# Patient Record
Sex: MALE | Race: AMERICAN INDIAN OR ALASKA NATIVE | NOT HISPANIC OR LATINO | URBAN - METROPOLITAN AREA
[De-identification: names, ages, dates, MRNs, and addresses within clinical notes are randomized per-mention and may not be internally consistent; named-entity substitution may affect disease eponyms.]

---

## 2017-03-20 ENCOUNTER — EMERGENCY (EMERGENCY)
Facility: HOSPITAL | Age: 77
LOS: 0 days | Discharge: HOME | End: 2017-03-20
Attending: EMERGENCY MEDICINE
Payer: MEDICARE

## 2017-03-20 VITALS
SYSTOLIC BLOOD PRESSURE: 132 MMHG | HEART RATE: 55 BPM | RESPIRATION RATE: 17 BRPM | TEMPERATURE: 98 F | DIASTOLIC BLOOD PRESSURE: 78 MMHG | WEIGHT: 154.1 LBS | OXYGEN SATURATION: 97 % | HEIGHT: 67 IN

## 2017-03-20 DIAGNOSIS — I10 ESSENTIAL (PRIMARY) HYPERTENSION: ICD-10-CM

## 2017-03-20 DIAGNOSIS — Z79.4 LONG TERM (CURRENT) USE OF INSULIN: ICD-10-CM

## 2017-03-20 DIAGNOSIS — M25.511 PAIN IN RIGHT SHOULDER: ICD-10-CM

## 2017-03-20 DIAGNOSIS — E11.9 TYPE 2 DIABETES MELLITUS WITHOUT COMPLICATIONS: ICD-10-CM
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
## 2017-03-20 DIAGNOSIS — I25.10 ATHEROSCLEROTIC HEART DISEASE OF NATIVE CORONARY ARTERY WITHOUT ANGINA PECTORIS: ICD-10-CM

## 2017-03-20 DIAGNOSIS — S43.006A UNSPECIFIED DISLOCATION OF UNSPECIFIED SHOULDER JOINT, INITIAL ENCOUNTER: ICD-10-CM

## 2017-03-20 PROCEDURE — 70450 CT HEAD/BRAIN W/O DYE: CPT | Mod: 26

## 2017-03-20 PROCEDURE — 23650 CLTX SHO DSLC W/MNPJ WO ANES: CPT | Mod: 54

## 2017-03-20 PROCEDURE — 73060 X-RAY EXAM OF HUMERUS: CPT | Mod: 26,RT

## 2017-03-20 PROCEDURE — 73030 X-RAY EXAM OF SHOULDER: CPT | Mod: 26,RT

## 2017-03-20 PROCEDURE — 99284 EMERGENCY DEPT VISIT MOD MDM: CPT | Mod: 25

## 2017-03-20 PROCEDURE — 73030 X-RAY EXAM OF SHOULDER: CPT | Mod: 26,77,RT

## 2017-03-20 RX ORDER — LIDOCAINE HCL 20 MG/ML
15 VIAL (ML) INJECTION ONCE
Qty: 0 | Refills: 0 | Status: COMPLETED | OUTPATIENT
Start: 2017-03-20 | End: 2017-03-20

## 2017-03-20 RX ORDER — IBUPROFEN 200 MG
1 TABLET ORAL
Qty: 9 | Refills: 0 | OUTPATIENT
Start: 2017-03-20 | End: 2017-03-23

## 2017-03-20 RX ADMIN — Medication 15 MILLILITER(S): at 12:59

## 2017-03-20 NOTE — ED ADULT NURSE NOTE - PMH
CAD (Coronary Artery Disease)  s/p 4 stents (1 in '02, 1 in '04, 2 in '11)  CVA (Cerebral Infarction)    DM (Diabetes Mellitus)    HTN - Hypertension    PUD (Peptic Ulcer Disease)

## 2017-03-20 NOTE — ED PROCEDURE NOTE - NS ED PERI VASCULAR NEG
no swelling/no cyanosis of extremity/capillary refill time < 2 seconds/no paresthesia/fingers/toes warm to touch

## 2017-03-20 NOTE — ED ADULT NURSE NOTE - OBJECTIVE STATEMENT
pt presents to ED s/p fall in bath tub, denies head/ neck / back pain.  Pt slipped and fell w/o LOC.

## 2017-03-20 NOTE — ED PROVIDER NOTE - PHYSICAL EXAMINATION
Gen: Alert, Well appearing. NAD    Head: NC, AT, PERRL, EOMI, normal lids/conjunctiva   ENT: Bilateral TM WNL, normal hearing, patent oropharynx without erythema/exudate, uvula midline  Neck: supple, no tenderness/meningismus/JVD   Pulm: Bilateral clear BS, normal resp effort, no wheeze/stridor/retractions  CV: RRR, no M/R/G, +dist pulses   Abd: soft, NT/ND, +BS, no guarding/rebound tenderness  Mskel: ? dislocation. + no tenderness to shoulder, rather proximal/mid humerus. from in wrist and sensation and pulses intact.   Skin: no rash   Neuro: AAOx2, no sensory/motor deficits, CN 2-12 intact

## 2017-03-20 NOTE — ED PROVIDER NOTE - OBJECTIVE STATEMENT
75yo male from home, lives with daughter, ambulates with cane, with pmh HTN, CAD s/p 4 stents, CHF, CVA, DM, mild dementia, presents s/p slip and fall in shower. Pt denies dizziness, cp, headache, head injury, LOC. Pt only with rt shoulder/humerus pain.     ROS: No fever/chills. No photophobia/eye pain/changes in vision, No ear pain/sore throat/dysphagia, No chest pain/palpitations. No SOB/cough/stridor. No abdominal pain, N/V/D, no black/bloody bm. No dysuria/frequency/discharge, No headache. No Dizziness.  No rash.  No numbness/tingling/weakness. 77yo male from home, lives with daughter, ambulates with cane, with pmh HTN, CAD s/p 4 stents, CHF, CVA, DM, mild dementia, presents s/p slip and fall in shower. Pt denies dizziness, cp, headache, head injury, LOC. Pt only with rt shoulder/humerus pain. family at bedside, states just moved into new place, not set up for him yet. heard him fall in the shower. pt at baseline mental status.     ROS: No fever/chills. No photophobia/eye pain/changes in vision, No ear pain/sore throat/dysphagia, No chest pain/palpitations. No SOB/cough/stridor. No abdominal pain, N/V/D, no black/bloody bm. No dysuria/frequency/discharge, No headache. No Dizziness.  No rash.  No numbness/tingling/weakness.

## 2017-03-20 NOTE — ED PROVIDER NOTE - CARE PLAN
Principal Discharge DX:	Shoulder dislocation  Secondary Diagnosis:	Shoulder fracture  Secondary Diagnosis:	Fall

## 2017-03-20 NOTE — ED PROVIDER NOTE - MEDICAL DECISION MAKING DETAILS
shoulder reduced with IM lidocaine injection. no complications. CT scan demonstrates no acute pathology. + hill sachs prior to reduction. fu with ortho. sling given and tylenol motrin for pain. Discussed results and outcome of testing with the patient.  Patient given copy of available results. Patient advised to please follow up with their PMD within the next 24 hours and return to the Emergency Department for worsening symptoms or any other concerns.

## 2017-06-29 ENCOUNTER — INPATIENT (INPATIENT)
Facility: HOSPITAL | Age: 77
LOS: 3 days | Discharge: HOME | End: 2017-07-03
Attending: INTERNAL MEDICINE | Admitting: INTERNAL MEDICINE
Payer: MEDICARE

## 2017-06-29 VITALS
DIASTOLIC BLOOD PRESSURE: 94 MMHG | SYSTOLIC BLOOD PRESSURE: 133 MMHG | RESPIRATION RATE: 18 BRPM | HEIGHT: 67 IN | TEMPERATURE: 98 F | WEIGHT: 156.09 LBS | HEART RATE: 64 BPM | OXYGEN SATURATION: 100 %

## 2017-06-29 DIAGNOSIS — I21.4 NON-ST ELEVATION (NSTEMI) MYOCARDIAL INFARCTION: ICD-10-CM

## 2017-06-29 DIAGNOSIS — I50.9 HEART FAILURE, UNSPECIFIED: ICD-10-CM

## 2017-06-29 DIAGNOSIS — J15.6 PNEUMONIA DUE TO OTHER GRAM-NEGATIVE BACTERIA: ICD-10-CM

## 2017-06-29 DIAGNOSIS — I50.21 ACUTE SYSTOLIC (CONGESTIVE) HEART FAILURE: ICD-10-CM

## 2017-06-29 LAB
ALBUMIN SERPL ELPH-MCNC: 3 G/DL — LOW (ref 3.3–5)
ALP SERPL-CCNC: 96 U/L — SIGNIFICANT CHANGE UP (ref 40–120)
ALT FLD-CCNC: 15 U/L — SIGNIFICANT CHANGE UP (ref 12–78)
ANION GAP SERPL CALC-SCNC: 8 MMOL/L — SIGNIFICANT CHANGE UP (ref 5–17)
APTT BLD: 27.2 SEC — LOW (ref 27.5–37.4)
AST SERPL-CCNC: 23 U/L — SIGNIFICANT CHANGE UP (ref 15–37)
BASOPHILS # BLD AUTO: 0.1 K/UL — SIGNIFICANT CHANGE UP (ref 0–0.2)
BASOPHILS NFR BLD AUTO: 1.1 % — SIGNIFICANT CHANGE UP (ref 0–2)
BILIRUB SERPL-MCNC: 0.8 MG/DL — SIGNIFICANT CHANGE UP (ref 0.2–1.2)
BUN SERPL-MCNC: 10 MG/DL — SIGNIFICANT CHANGE UP (ref 7–23)
CALCIUM SERPL-MCNC: 8.9 MG/DL — SIGNIFICANT CHANGE UP (ref 8.5–10.1)
CHLORIDE SERPL-SCNC: 108 MMOL/L — SIGNIFICANT CHANGE UP (ref 96–108)
CK MB CFR SERPL CALC: 1.7 NG/ML — SIGNIFICANT CHANGE UP (ref 0.5–3.6)
CK SERPL-CCNC: 26 U/L — SIGNIFICANT CHANGE UP (ref 26–308)
CK SERPL-CCNC: 33 U/L — SIGNIFICANT CHANGE UP (ref 26–308)
CO2 SERPL-SCNC: 24 MMOL/L — SIGNIFICANT CHANGE UP (ref 22–31)
CREAT SERPL-MCNC: 0.92 MG/DL — SIGNIFICANT CHANGE UP (ref 0.5–1.3)
EOSINOPHIL # BLD AUTO: 0.2 K/UL — SIGNIFICANT CHANGE UP (ref 0–0.5)
EOSINOPHIL NFR BLD AUTO: 3.1 % — SIGNIFICANT CHANGE UP (ref 0–6)
GLUCOSE SERPL-MCNC: 82 MG/DL — SIGNIFICANT CHANGE UP (ref 70–99)
HCT VFR BLD CALC: 41.4 % — SIGNIFICANT CHANGE UP (ref 39–50)
HGB BLD-MCNC: 14 G/DL — SIGNIFICANT CHANGE UP (ref 13–17)
INR BLD: 0.99 RATIO — SIGNIFICANT CHANGE UP (ref 0.88–1.16)
LIDOCAIN IGE QN: 177 U/L — SIGNIFICANT CHANGE UP (ref 73–393)
LYMPHOCYTES # BLD AUTO: 2.1 K/UL — SIGNIFICANT CHANGE UP (ref 1–3.3)
LYMPHOCYTES # BLD AUTO: 26.2 % — SIGNIFICANT CHANGE UP (ref 13–44)
MCHC RBC-ENTMCNC: 26.8 PG — LOW (ref 27–34)
MCHC RBC-ENTMCNC: 33.9 GM/DL — SIGNIFICANT CHANGE UP (ref 32–36)
MCV RBC AUTO: 79.1 FL — LOW (ref 80–100)
MONOCYTES # BLD AUTO: 0.5 K/UL — SIGNIFICANT CHANGE UP (ref 0–0.9)
MONOCYTES NFR BLD AUTO: 6.7 % — SIGNIFICANT CHANGE UP (ref 2–14)
NEUTROPHILS # BLD AUTO: 5 K/UL — SIGNIFICANT CHANGE UP (ref 1.8–7.4)
NEUTROPHILS NFR BLD AUTO: 62.9 % — SIGNIFICANT CHANGE UP (ref 43–77)
NT-PROBNP SERPL-SCNC: 8131 PG/ML — HIGH (ref 0–450)
NT-PROBNP SERPL-SCNC: 8250 PG/ML — HIGH (ref 0–450)
PLATELET # BLD AUTO: 145 K/UL — LOW (ref 150–400)
POTASSIUM SERPL-MCNC: 4.7 MMOL/L — SIGNIFICANT CHANGE UP (ref 3.5–5.3)
POTASSIUM SERPL-SCNC: 4.7 MMOL/L — SIGNIFICANT CHANGE UP (ref 3.5–5.3)
PROT SERPL-MCNC: 7.1 GM/DL — SIGNIFICANT CHANGE UP (ref 6–8.3)
PROTHROM AB SERPL-ACNC: 10.8 SEC — SIGNIFICANT CHANGE UP (ref 9.8–12.7)
RBC # BLD: 5.23 M/UL — SIGNIFICANT CHANGE UP (ref 4.2–5.8)
RBC # FLD: 14.5 % — SIGNIFICANT CHANGE UP (ref 11–15)
SODIUM SERPL-SCNC: 140 MMOL/L — SIGNIFICANT CHANGE UP (ref 135–145)
TROPONIN I SERPL-MCNC: 0.05 NG/ML — HIGH (ref 0.01–0.04)
TROPONIN I SERPL-MCNC: 0.05 NG/ML — HIGH (ref 0.01–0.04)
TROPONIN I SERPL-MCNC: 0.06 NG/ML — HIGH (ref 0.01–0.04)
WBC # BLD: 8 K/UL — SIGNIFICANT CHANGE UP (ref 3.8–10.5)
WBC # FLD AUTO: 8 K/UL — SIGNIFICANT CHANGE UP (ref 3.8–10.5)

## 2017-06-29 PROCEDURE — 71250 CT THORAX DX C-: CPT | Mod: 26

## 2017-06-29 PROCEDURE — 71010: CPT | Mod: 26

## 2017-06-29 PROCEDURE — 99285 EMERGENCY DEPT VISIT HI MDM: CPT

## 2017-06-29 RX ORDER — CEFTRIAXONE 500 MG/1
1 INJECTION, POWDER, FOR SOLUTION INTRAMUSCULAR; INTRAVENOUS EVERY 24 HOURS
Qty: 0 | Refills: 0 | Status: DISCONTINUED | OUTPATIENT
Start: 2017-06-30 | End: 2017-07-03

## 2017-06-29 RX ORDER — FUROSEMIDE 40 MG
20 TABLET ORAL ONCE
Qty: 0 | Refills: 0 | Status: COMPLETED | OUTPATIENT
Start: 2017-06-29 | End: 2017-06-29

## 2017-06-29 RX ORDER — ENOXAPARIN SODIUM 100 MG/ML
40 INJECTION SUBCUTANEOUS EVERY 24 HOURS
Qty: 0 | Refills: 0 | Status: DISCONTINUED | OUTPATIENT
Start: 2017-06-29 | End: 2017-07-03

## 2017-06-29 RX ORDER — FUROSEMIDE 40 MG
40 TABLET ORAL DAILY
Qty: 0 | Refills: 0 | Status: COMPLETED | OUTPATIENT
Start: 2017-06-29 | End: 2017-07-02

## 2017-06-29 RX ORDER — METOPROLOL TARTRATE 50 MG
50 TABLET ORAL
Qty: 0 | Refills: 0 | Status: DISCONTINUED | OUTPATIENT
Start: 2017-06-29 | End: 2017-07-03

## 2017-06-29 RX ORDER — LOSARTAN POTASSIUM 100 MG/1
100 TABLET, FILM COATED ORAL DAILY
Qty: 0 | Refills: 0 | Status: DISCONTINUED | OUTPATIENT
Start: 2017-06-29 | End: 2017-07-03

## 2017-06-29 RX ORDER — MONTELUKAST 4 MG/1
10 TABLET, CHEWABLE ORAL DAILY
Qty: 0 | Refills: 0 | Status: DISCONTINUED | OUTPATIENT
Start: 2017-06-29 | End: 2017-07-03

## 2017-06-29 RX ORDER — MEMANTINE HYDROCHLORIDE 10 MG/1
10 TABLET ORAL
Qty: 0 | Refills: 0 | Status: DISCONTINUED | OUTPATIENT
Start: 2017-06-29 | End: 2017-07-03

## 2017-06-29 RX ORDER — AZITHROMYCIN 500 MG/1
500 TABLET, FILM COATED ORAL ONCE
Qty: 0 | Refills: 0 | Status: COMPLETED | OUTPATIENT
Start: 2017-06-29 | End: 2017-06-29

## 2017-06-29 RX ORDER — DEXTROSE 50 % IN WATER 50 %
12.5 SYRINGE (ML) INTRAVENOUS ONCE
Qty: 0 | Refills: 0 | Status: DISCONTINUED | OUTPATIENT
Start: 2017-06-29 | End: 2017-07-03

## 2017-06-29 RX ORDER — GLUCAGON INJECTION, SOLUTION 0.5 MG/.1ML
1 INJECTION, SOLUTION SUBCUTANEOUS ONCE
Qty: 0 | Refills: 0 | Status: DISCONTINUED | OUTPATIENT
Start: 2017-06-29 | End: 2017-07-03

## 2017-06-29 RX ORDER — DEXTROSE 50 % IN WATER 50 %
25 SYRINGE (ML) INTRAVENOUS ONCE
Qty: 0 | Refills: 0 | Status: DISCONTINUED | OUTPATIENT
Start: 2017-06-29 | End: 2017-07-03

## 2017-06-29 RX ORDER — ASPIRIN/CALCIUM CARB/MAGNESIUM 324 MG
325 TABLET ORAL DAILY
Qty: 0 | Refills: 0 | Status: DISCONTINUED | OUTPATIENT
Start: 2017-06-29 | End: 2017-07-03

## 2017-06-29 RX ORDER — INSULIN GLARGINE 100 [IU]/ML
15 INJECTION, SOLUTION SUBCUTANEOUS AT BEDTIME
Qty: 0 | Refills: 0 | Status: DISCONTINUED | OUTPATIENT
Start: 2017-06-29 | End: 2017-07-03

## 2017-06-29 RX ORDER — AZITHROMYCIN 500 MG/1
TABLET, FILM COATED ORAL
Qty: 0 | Refills: 0 | Status: DISCONTINUED | OUTPATIENT
Start: 2017-06-29 | End: 2017-07-03

## 2017-06-29 RX ORDER — DEXTROSE 50 % IN WATER 50 %
1 SYRINGE (ML) INTRAVENOUS ONCE
Qty: 0 | Refills: 0 | Status: DISCONTINUED | OUTPATIENT
Start: 2017-06-29 | End: 2017-07-03

## 2017-06-29 RX ORDER — CEFTRIAXONE 500 MG/1
INJECTION, POWDER, FOR SOLUTION INTRAMUSCULAR; INTRAVENOUS
Qty: 0 | Refills: 0 | Status: DISCONTINUED | OUTPATIENT
Start: 2017-06-29 | End: 2017-07-03

## 2017-06-29 RX ORDER — ASCORBIC ACID 60 MG
500 TABLET,CHEWABLE ORAL DAILY
Qty: 0 | Refills: 0 | Status: DISCONTINUED | OUTPATIENT
Start: 2017-06-29 | End: 2017-07-03

## 2017-06-29 RX ORDER — CEFTRIAXONE 500 MG/1
1 INJECTION, POWDER, FOR SOLUTION INTRAMUSCULAR; INTRAVENOUS ONCE
Qty: 0 | Refills: 0 | Status: COMPLETED | OUTPATIENT
Start: 2017-06-29 | End: 2017-06-29

## 2017-06-29 RX ORDER — AZITHROMYCIN 500 MG/1
500 TABLET, FILM COATED ORAL EVERY 24 HOURS
Qty: 0 | Refills: 0 | Status: DISCONTINUED | OUTPATIENT
Start: 2017-06-30 | End: 2017-07-03

## 2017-06-29 RX ORDER — PANTOPRAZOLE SODIUM 20 MG/1
40 TABLET, DELAYED RELEASE ORAL
Qty: 0 | Refills: 0 | Status: DISCONTINUED | OUTPATIENT
Start: 2017-06-29 | End: 2017-07-03

## 2017-06-29 RX ORDER — SODIUM CHLORIDE 9 MG/ML
1000 INJECTION, SOLUTION INTRAVENOUS
Qty: 0 | Refills: 0 | Status: DISCONTINUED | OUTPATIENT
Start: 2017-06-29 | End: 2017-07-03

## 2017-06-29 RX ORDER — GABAPENTIN 400 MG/1
200 CAPSULE ORAL AT BEDTIME
Qty: 0 | Refills: 0 | Status: DISCONTINUED | OUTPATIENT
Start: 2017-06-29 | End: 2017-07-03

## 2017-06-29 RX ORDER — ATORVASTATIN CALCIUM 80 MG/1
40 TABLET, FILM COATED ORAL AT BEDTIME
Qty: 0 | Refills: 0 | Status: DISCONTINUED | OUTPATIENT
Start: 2017-06-29 | End: 2017-07-03

## 2017-06-29 RX ORDER — INSULIN LISPRO 100/ML
VIAL (ML) SUBCUTANEOUS
Qty: 0 | Refills: 0 | Status: DISCONTINUED | OUTPATIENT
Start: 2017-06-29 | End: 2017-07-03

## 2017-06-29 RX ORDER — TAMSULOSIN HYDROCHLORIDE 0.4 MG/1
0.4 CAPSULE ORAL AT BEDTIME
Qty: 0 | Refills: 0 | Status: DISCONTINUED | OUTPATIENT
Start: 2017-06-29 | End: 2017-07-03

## 2017-06-29 RX ADMIN — MONTELUKAST 10 MILLIGRAM(S): 4 TABLET, CHEWABLE ORAL at 12:33

## 2017-06-29 RX ADMIN — Medication 50 MILLIGRAM(S): at 17:52

## 2017-06-29 RX ADMIN — TAMSULOSIN HYDROCHLORIDE 0.4 MILLIGRAM(S): 0.4 CAPSULE ORAL at 21:32

## 2017-06-29 RX ADMIN — ATORVASTATIN CALCIUM 40 MILLIGRAM(S): 80 TABLET, FILM COATED ORAL at 21:32

## 2017-06-29 RX ADMIN — INSULIN GLARGINE 15 UNIT(S): 100 INJECTION, SOLUTION SUBCUTANEOUS at 21:38

## 2017-06-29 RX ADMIN — GABAPENTIN 200 MILLIGRAM(S): 400 CAPSULE ORAL at 21:32

## 2017-06-29 RX ADMIN — CEFTRIAXONE 100 GRAM(S): 500 INJECTION, POWDER, FOR SOLUTION INTRAMUSCULAR; INTRAVENOUS at 16:43

## 2017-06-29 RX ADMIN — Medication 20 MILLIGRAM(S): at 10:02

## 2017-06-29 RX ADMIN — AZITHROMYCIN 255 MILLIGRAM(S): 500 TABLET, FILM COATED ORAL at 15:46

## 2017-06-29 RX ADMIN — ENOXAPARIN SODIUM 40 MILLIGRAM(S): 100 INJECTION SUBCUTANEOUS at 11:54

## 2017-06-29 RX ADMIN — MEMANTINE HYDROCHLORIDE 10 MILLIGRAM(S): 10 TABLET ORAL at 17:52

## 2017-06-29 RX ADMIN — Medication 325 MILLIGRAM(S): at 11:54

## 2017-06-29 RX ADMIN — Medication 500 MILLIGRAM(S): at 12:34

## 2017-06-29 NOTE — ED ADULT NURSE NOTE - PMH
CAD (Coronary Artery Disease)  s/p 4 stents (1 in '02, 1 in '04, 2 in '11)  CVA (Cerebral Infarction)    Dementia    DM (Diabetes Mellitus)    HTN - Hypertension    PUD (Peptic Ulcer Disease)

## 2017-06-29 NOTE — H&P ADULT - NSHPREVIEWOFSYSTEMS_GEN_ALL_CORE
CONSTITUTIONAL: No fever, weight loss, or fatigue  EYES: No eye pain, visual disturbances, or discharge  ENMT:  No difficulty hearing, tinnitus, vertigo; No sinus or throat pain  NECK: No pain or stiffness  BREASTS: No pain, masses, or nipple discharge  RESPIRATORY: +++ cough,  NO wheezing, chills or hemoptysis; +++ shortness of breath, ++ PND  CARDIOVASCULAR: No chest pain, palpitations, dizziness, or leg swelling  GASTROINTESTINAL: No abdominal or epigastric pain. No nausea, vomiting, or hematemesis; No diarrhea or constipation. No melena or hematochezia.  GENITOURINARY: No dysuria, frequency, hematuria, or incontinence  NEUROLOGICAL: No headaches, memory loss, loss of strength, numbness, or tremors  SKIN: No itching, burning, rashes, or lesions   LYMPH NODES: No enlarged glands  ENDOCRINE: No heat or cold intolerance; No hair loss  MUSCULOSKELETAL: No joint pain or swelling; No muscle, back, or extremity pain  PSYCHIATRIC: No depression, anxiety, mood swings, or difficulty sleeping  HEME/LYMPH: No easy bruising, or bleeding gums  ALLERGY AND IMMUNOLOGIC: No hives or eczema

## 2017-06-29 NOTE — H&P ADULT - NSHPLABSRESULTS_GEN_ALL_CORE
LABS:                        14.0   8.0   )-----------( 145      ( 29 Jun 2017 08:29 )             41.4     06-29    140  |  108  |  10  ----------------------------<  82  4.7   |  24  |  0.92    Ca    8.9      29 Jun 2017 08:29    TPro  7.1  /  Alb  3.0<L>  /  TBili  0.8  /  DBili  x   /  AST  23  /  ALT  15  /  AlkPhos  96  06-29    PT/INR - ( 29 Jun 2017 08:46 )   PT: 10.8 sec;   INR: 0.99 ratio         PTT - ( 29 Jun 2017 08:46 )  PTT:27.2 sec    CAPILLARY BLOOD GLUCOSE  148 (29 Jun 2017 11:11)        CARDIAC MARKERS ( 29 Jun 2017 11:48 )  .046 ng/mL / x     / 33 U/L / x     / x      CARDIAC MARKERS ( 29 Jun 2017 08:29 )  .054 ng/mL / x     / x     / x     / 1.7 ng/mL    < from: Xray Chest 1 View AP/PA. (06.29.17 @ 07:55) >      EXAM:  CHEST SINGLE VIEW                            PROCEDURE DATE:  06/29/2017        INTERPRETATION:  cough      A single portable radiograph suggests cardiomegaly, although there may be   magnification from technique..    Vascular markings are increased    There are atelectatic changes right greater than left. A right lower lobe   infiltrate and/or mass is identified. There is a right pleural effusion.   There is trace fluid on the left    No pneumothorax is seen    The osseous structures are grossly intact.    IMPRESSION:    Cardiomegaly. Congestive changes. Right lower lobe infiltrate. However a   mass cannot be excluded. Right pleural effusion. CT imaging of the chest   recommended..

## 2017-06-29 NOTE — H&P ADULT - HISTORY OF PRESENT ILLNESS
76yo, [place] with h/o  dementia, HTN, HL, DM2, ACD x 2 who presents to the ED with  increasing SOB worsening last night, associated with 2 pillow orthopnea and PND.  Notes cough for 4-5 days.  Denies CP,  palpitation, n/v/d, fever, chills, weakness, abdominal pain ,dysuria.   IN ER CXR, CHF and elevated troponin 0.054

## 2017-06-29 NOTE — H&P ADULT - NSHPPHYSICALEXAM_GEN_ALL_CORE
PHYSICAL EXAM:  GENERAL: NAD, well-groomed, well-developed  HEAD:  Atraumatic, Normocephalic  EYES: EOMI, PERRLA, conjunctiva and sclera clear  ENMT: No tonsillar erythema, exudates, or enlargement; Moist mucous membranes, No lesions  NECK: Supple, No JVD, Normal thyroid  NERVOUS SYSTEM:  Alert & Oriented X3; Motor Strength 5/5 B/L upper and lower extremities; DTRs 2+ intact and symmetric  CHEST/LUNG: Decreased BS bilaterally ; Note rales,  NO rhonchi, wheezing, or rubs  HEART: Regular rate and rhythm; No murmurs, rubs, or gallops  ABDOMEN: Soft, Nontender, Nondistended; Bowel sounds present  EXTREMITIES:  2+ Peripheral Pulses, No clubbing, cyanosis, or edema  LYMPH: No lymphadenopathy noted  SKIN: No rashes or lesions

## 2017-06-29 NOTE — ED PROVIDER NOTE - OBJECTIVE STATEMENT
Pt is a 76 yo gentleman with a pmhx of dementia, HTN, HL, IDDM2, ACD x 2 who presents to the ED with SOB starting last night. Has 2 pillow orthopnea and better when he sits up. No leg swelling, no hx of dvt/pe, no known hx of CHF, no cough, no fevers, no chest pain, no abdominal pain. Pt is a 76 yo gentleman with a pmhx of dementia, HTN, HL, IDDM2, ACD x 2 who presents to the ED with SOB starting last night. Has 2 pillow orthopnea and better when he sits up. No leg swelling, no hx of dvt/pe, no known hx of CHF, no cough, no fevers, no chest pain, no abdominal pain. No respiratory distress, no hx of COPD.

## 2017-06-29 NOTE — CONSULT NOTE ADULT - SUBJECTIVE AND OBJECTIVE BOX
Patient is a 77y old  Male who presents with a chief complaint of Shortness of breath (29 Jun 2017 14:39)        PAST MEDICAL & SURGICAL HISTORY:  Dementia  CAD (Coronary Artery Disease): s/p 4 stents (1 in &#x27;02, 1 in &#x27;04, 2 in &#x27;11)  PUD (Peptic Ulcer Disease)  CVA (Cerebral Infarction)  DM (Diabetes Mellitus)  HTN - Hypertension  Coronary Stent  History of Cholecystectomy      Summary of admission HPI:                PREVIOUS DIAGNOSTIC TESTING:      ECHO  FINDINGS:    STRESS  FINDINGS:    CATHETERIZATION  FINDINGS:    ELECTROPHYSIOLOGY STUDY  FINDINGS:    CAROTID ULTRASOUND:  FINDINGS    VENOUS DUPLEX SCAN:  FINDINGS:    CHEST CT PULMONARY ANGIO with IV Contrast:  FINDINGS:  MEDICATIONS  (STANDING):  losartan 100 milliGRAM(s) Oral daily  tamsulosin 0.4 milliGRAM(s) Oral at bedtime  gabapentin 200 milliGRAM(s) Oral at bedtime  atorvastatin 40 milliGRAM(s) Oral at bedtime  metoprolol 50 milliGRAM(s) Oral two times a day  montelukast 10 milliGRAM(s) Oral daily  memantine 10 milliGRAM(s) Oral two times a day  ascorbic acid 500 milliGRAM(s) Oral daily  enoxaparin Injectable 40 milliGRAM(s) SubCutaneous every 24 hours  furosemide   Injectable 40 milliGRAM(s) IV Push daily  insulin lispro (HumaLOG) corrective regimen sliding scale   SubCutaneous three times a day before meals  dextrose 5%. 1000 milliLiter(s) (50 mL/Hr) IV Continuous <Continuous>  dextrose 50% Injectable 12.5 Gram(s) IV Push once  dextrose 50% Injectable 25 Gram(s) IV Push once  dextrose 50% Injectable 25 Gram(s) IV Push once  aspirin 325 milliGRAM(s) Oral daily  insulin glargine Injectable (LANTUS) 15 Unit(s) SubCutaneous at bedtime  pantoprazole    Tablet 40 milliGRAM(s) Oral before breakfast  cefTRIAXone   IVPB   IV Intermittent   azithromycin  IVPB   IV Intermittent     MEDICATIONS  (PRN):  dextrose Gel 1 Dose(s) Oral once PRN Blood Glucose LESS THAN 70 milliGRAM(s)/deciliter  glucagon  Injectable 1 milliGRAM(s) IntraMuscular once PRN Glucose LESS THAN 70 milligrams/deciliter      FAMILY HISTORY:      SOCIAL HISTORY:    CIGARETTES:    ALCOHOL:    REVIEW OF SYSTEMS:    CONSTITUTIONAL: No fever, weight loss, chills, shakes, or fatigue  EYES: No eye pain, visual disturbances, or discharge  ENMT:  No difficulty hearing, tinnitus, vertigo; No sinus or throat pain  NECK: No pain or stiffness  BREASTS: No pain, masses, or nipple discharge  RESPIRATORY: No cough, wheezing, hemoptysis, or shortness of breath  CARDIOVASCULAR: No chest pain, dyspnea, palpitations, dizziness, syncope, paroxysmal nocturnal dyspnea, orthopnea, or arm or leg swelling  GASTROINTESTINAL: No abdominal  or epigastric pain, nausea, vomiting, hematemesis, diarrhea, constipation, melena or bright red blood.  GENITOURINARY: No dysuria, nocturia, hematuria, or urinary incontinence  NEUROLOGICAL: No headaches, memory loss, slurred speech, limb weakness, loss of strength, numbness, or tremors  SKIN: No itching, burning, rashes, or lesions   LYMPH NODES: No enlarged glands  ENDOCRINE: No heat or cold intolerance, or hair loss  MUSCULOSKELETAL: No joint pain or swelling, muscle, back, or extremity pain  PSYCHIATRIC: No depression, anxiety, or difficulty sleeping  HEME/LYMPH: No easy bruising or bleeding gums  ALLERY AND IMMUNOLOGIC: No hives or rash.      Vital Signs Last 24 Hrs  T(C): 36.7 (29 Jun 2017 19:49), Max: 36.7 (29 Jun 2017 16:00)  T(F): 98 (29 Jun 2017 19:49), Max: 98.1 (29 Jun 2017 16:00)  HR: 63 (29 Jun 2017 19:49) (62 - 81)  BP: 132/78 (29 Jun 2017 19:49) (131/81 - 175/78)  BP(mean): --  RR: 23 (29 Jun 2017 19:49) (18 - 23)  SpO2: 97% (29 Jun 2017 19:49) (97% - 100%)    PHYSICAL EXAM:    GENERAL: In no apparent distress, well nourished, and hydrated.  HEAD:  Atraumatic, Normocephalic  EYES: EOMI, PERRLA, conjunctiva and sclera clear  ENMT: No tonsillar erythema, exudates, or enlargement; Moist mucous membranes, Good dentition, No lesions  NECK: Supple and normal thyroid.  No JVD or carotid bruit.  Carotid pulse is 2+ bilaterally.  HEART: Regular rate and rhythm; No murmurs, rubs, or gallops.  PULMONARY: Clear to auscultation and perfusion.  No rales, wheezing, or rhonchi bilaterally.  ABDOMEN: Soft, Nontender, Nondistended; Bowel sounds present  EXTREMITIES:  2+ Peripheral Pulses, No clubbing, cyanosis, or edema  LYMPH: No lymphadenopathy noted  NEUROLOGICAL: Grossly nonfocal          INTERPRETATION OF TELEMETRY:    ECG:    I&O's Detail      LABS:                        14.0   8.0   )-----------( 145      ( 29 Jun 2017 08:29 )             41.4     06-29    140  |  108  |  10  ----------------------------<  82  4.7   |  24  |  0.92    Ca    8.9      29 Jun 2017 08:29    TPro  7.1  /  Alb  3.0<L>  /  TBili  0.8  /  DBili  x   /  AST  23  /  ALT  15  /  AlkPhos  96  06-29    CARDIAC MARKERS ( 29 Jun 2017 20:16 )  x     / x     / 26 U/L / x     / x      CARDIAC MARKERS ( 29 Jun 2017 11:48 )  .046 ng/mL / x     / 33 U/L / x     / x      CARDIAC MARKERS ( 29 Jun 2017 08:29 )  .054 ng/mL / x     / x     / x     / 1.7 ng/mL      PT/INR - ( 29 Jun 2017 08:46 )   PT: 10.8 sec;   INR: 0.99 ratio         PTT - ( 29 Jun 2017 08:46 )  PTT:27.2 sec    BNPSerum Pro-Brain Natriuretic Peptide: 8250 pg/mL (06-29 @ 11:48)  Serum Pro-Brain Natriuretic Peptide: 8131 pg/mL (06-29 @ 08:29)    I&O's Detail    Daily Height in cm: 170.18 (29 Jun 2017 07:26)    Daily     RADIOLOGY & ADDITIONAL STUDIES:

## 2017-06-29 NOTE — ED ADULT TRIAGE NOTE - CHIEF COMPLAINT QUOTE
sob since yesterday worsens with lying flat. Pt denies any chest pain. Pt c/o chronic lower back pain. Pt able to speak in full sentences at triage

## 2017-06-29 NOTE — ED PROVIDER NOTE - MEDICAL DECISION MAKING DETAILS
<<-----Click here for Discharge Medication Review
Ddx: CHF/pleural edema/ No chest pain or pleuritic pain to suggest PE  Plan: Labs, bnp, coags, troponin, ecg, cxr, reassesss

## 2017-06-29 NOTE — ED PROVIDER NOTE - CARE PLAN
Principal Discharge DX:	SOB (shortness of breath) Principal Discharge DX:	SOB (shortness of breath)  Secondary Diagnosis:	Acute congestive heart failure, unspecified congestive heart failure type

## 2017-06-30 LAB
ANION GAP SERPL CALC-SCNC: 10 MMOL/L — SIGNIFICANT CHANGE UP (ref 5–17)
BUN SERPL-MCNC: 11 MG/DL — SIGNIFICANT CHANGE UP (ref 7–23)
CALCIUM SERPL-MCNC: 8.8 MG/DL — SIGNIFICANT CHANGE UP (ref 8.5–10.1)
CHLORIDE SERPL-SCNC: 106 MMOL/L — SIGNIFICANT CHANGE UP (ref 96–108)
CK SERPL-CCNC: 31 U/L — SIGNIFICANT CHANGE UP (ref 26–308)
CO2 SERPL-SCNC: 24 MMOL/L — SIGNIFICANT CHANGE UP (ref 22–31)
CREAT SERPL-MCNC: 0.87 MG/DL — SIGNIFICANT CHANGE UP (ref 0.5–1.3)
GLUCOSE SERPL-MCNC: 90 MG/DL — SIGNIFICANT CHANGE UP (ref 70–99)
HBA1C BLD-MCNC: 6.6 % — HIGH (ref 4–5.6)
HCT VFR BLD CALC: 39.2 % — SIGNIFICANT CHANGE UP (ref 39–50)
HGB BLD-MCNC: 13.3 G/DL — SIGNIFICANT CHANGE UP (ref 13–17)
MCHC RBC-ENTMCNC: 26.6 PG — LOW (ref 27–34)
MCHC RBC-ENTMCNC: 34 GM/DL — SIGNIFICANT CHANGE UP (ref 32–36)
MCV RBC AUTO: 78.2 FL — LOW (ref 80–100)
PLATELET # BLD AUTO: 130 K/UL — LOW (ref 150–400)
POTASSIUM SERPL-MCNC: 3.7 MMOL/L — SIGNIFICANT CHANGE UP (ref 3.5–5.3)
POTASSIUM SERPL-SCNC: 3.7 MMOL/L — SIGNIFICANT CHANGE UP (ref 3.5–5.3)
RBC # BLD: 5.01 M/UL — SIGNIFICANT CHANGE UP (ref 4.2–5.8)
RBC # FLD: 13.7 % — SIGNIFICANT CHANGE UP (ref 11–15)
SODIUM SERPL-SCNC: 140 MMOL/L — SIGNIFICANT CHANGE UP (ref 135–145)
TROPONIN I SERPL-MCNC: 0.05 NG/ML — HIGH (ref 0.01–0.04)
WBC # BLD: 7 K/UL — SIGNIFICANT CHANGE UP (ref 3.8–10.5)
WBC # FLD AUTO: 7 K/UL — SIGNIFICANT CHANGE UP (ref 3.8–10.5)

## 2017-06-30 RX ADMIN — ENOXAPARIN SODIUM 40 MILLIGRAM(S): 100 INJECTION SUBCUTANEOUS at 10:55

## 2017-06-30 RX ADMIN — ATORVASTATIN CALCIUM 40 MILLIGRAM(S): 80 TABLET, FILM COATED ORAL at 22:29

## 2017-06-30 RX ADMIN — AZITHROMYCIN 255 MILLIGRAM(S): 500 TABLET, FILM COATED ORAL at 15:40

## 2017-06-30 RX ADMIN — Medication 50 MILLIGRAM(S): at 06:04

## 2017-06-30 RX ADMIN — INSULIN GLARGINE 15 UNIT(S): 100 INJECTION, SOLUTION SUBCUTANEOUS at 22:35

## 2017-06-30 RX ADMIN — MEMANTINE HYDROCHLORIDE 10 MILLIGRAM(S): 10 TABLET ORAL at 17:15

## 2017-06-30 RX ADMIN — LOSARTAN POTASSIUM 100 MILLIGRAM(S): 100 TABLET, FILM COATED ORAL at 06:04

## 2017-06-30 RX ADMIN — CEFTRIAXONE 100 GRAM(S): 500 INJECTION, POWDER, FOR SOLUTION INTRAMUSCULAR; INTRAVENOUS at 15:40

## 2017-06-30 RX ADMIN — Medication 325 MILLIGRAM(S): at 11:19

## 2017-06-30 RX ADMIN — TAMSULOSIN HYDROCHLORIDE 0.4 MILLIGRAM(S): 0.4 CAPSULE ORAL at 22:32

## 2017-06-30 RX ADMIN — MEMANTINE HYDROCHLORIDE 10 MILLIGRAM(S): 10 TABLET ORAL at 06:04

## 2017-06-30 RX ADMIN — GABAPENTIN 200 MILLIGRAM(S): 400 CAPSULE ORAL at 22:29

## 2017-06-30 RX ADMIN — Medication 500 MILLIGRAM(S): at 12:21

## 2017-06-30 RX ADMIN — MONTELUKAST 10 MILLIGRAM(S): 4 TABLET, CHEWABLE ORAL at 11:19

## 2017-06-30 RX ADMIN — Medication 50 MILLIGRAM(S): at 17:15

## 2017-06-30 RX ADMIN — PANTOPRAZOLE SODIUM 40 MILLIGRAM(S): 20 TABLET, DELAYED RELEASE ORAL at 06:04

## 2017-06-30 RX ADMIN — Medication 40 MILLIGRAM(S): at 06:04

## 2017-06-30 NOTE — OCCUPATIONAL THERAPY INITIAL EVALUATION ADULT - TRANSFER SAFETY CONCERNS NOTED: SIT/STAND, REHAB EVAL
decreased step length/decreased proprioception/decreased sequencing ability/losing balance/decreased weight-shifting ability

## 2017-06-30 NOTE — CHART NOTE - NSCHARTNOTEFT_GEN_A_CORE
Hospitalist Medicine PA    Called by RN that pt had 7 beats VT while lying down. Patient is a 77y old Male admitted for CHF, SOB. RN states patient is asymptomatic. Denies chest pain, palpitations, SOB, lightheadeness. Today's labs negative for electrolyte abnormalities.    Vital Signs Last 24 Hrs  T(C): 36.7 (30 Jun 2017 16:29), Max: 36.7 (30 Jun 2017 16:29)  T(F): 98 (30 Jun 2017 16:29), Max: 98 (30 Jun 2017 16:29)  HR: 64 (30 Jun 2017 19:25) (58 - 76)  BP: 116/64 (30 Jun 2017 19:25) (114/73 - 145/67)  BP(mean): --  RR: 18 (30 Jun 2017 19:25) (18 - 18)  SpO2: 98% (30 Jun 2017 19:25) (98% - 100%)    - Continue telemetry  - AM lytes ordered  - Troponin ordered  -  w/ Dr. Eldridge and Dr. Flores  - Will follow up with labs

## 2017-06-30 NOTE — PHYSICAL THERAPY INITIAL EVALUATION ADULT - GAIT DEVIATIONS NOTED, PT EVAL
decreased stride length/increased time in double stance/decreased step length/decreased weight-shifting ability/decreased ishmael

## 2017-06-30 NOTE — PHYSICAL THERAPY INITIAL EVALUATION ADULT - LIVES WITH, PROFILE
his son  in a private house with 5 steps to enter with bilateral hand rails ; pt has no other steps to negotiate inside; pt has a HHA  Monday to Friday from  9AM  to 12PM

## 2017-06-30 NOTE — PHYSICAL THERAPY INITIAL EVALUATION ADULT - TRANSFER SAFETY CONCERNS NOTED: SIT/STAND, REHAB EVAL
decreased sequencing ability/decreased proprioception/decreased weight-shifting ability/losing balance/decreased step length

## 2017-06-30 NOTE — OCCUPATIONAL THERAPY INITIAL EVALUATION ADULT - PRECAUTIONS/LIMITATIONS, REHAB EVAL
cardiac precautions/Pt  has Dementia  and diminished reaction time due to age related changes/fall precautions

## 2017-06-30 NOTE — DIETITIAN INITIAL EVALUATION ADULT. - OTHER INFO
Pt seen for CHF. Unable to interview pt due to cognitive impairment/ lethargy. Per chart info pt lives c son who assists c ADL. No reports of N/V/C/D or chew/swallowing difficulty. Staff reports pt appetite as fair.

## 2017-06-30 NOTE — PHYSICAL THERAPY INITIAL EVALUATION ADULT - TRANSFER SAFETY CONCERNS NOTED: BED/CHAIR, REHAB EVAL
decreased sequencing ability/decreased proprioception/losing balance/decreased weight-shifting ability/decreased step length

## 2017-06-30 NOTE — OCCUPATIONAL THERAPY INITIAL EVALUATION ADULT - SOCIAL CONCERNS
Pt's daughter William  voiced concerns that pt needs increased hour  with HHA  to assist him at home ; daughter also mentioned  that pt is slsatd to begin PT intervention on his shoulder next week./Complex psychosocial needs/coping issues Pt's daughter William  voiced concerns that pt needs increased hours  with HHA  to assist him at home ; daughter also mentioned  that pt is slated to begin PT intervention on his shoulder next week./Complex psychosocial needs/coping issues

## 2017-06-30 NOTE — DIETITIAN INITIAL EVALUATION ADULT. - PERTINENT MEDS FT
Lovenox, Zithromax, Rocephin, Vitamin C, Lipitor, Lasix, Neurontin, Lantus, Humalog sliding scale, Cozaar, Namenda, Lopressor, Protonix, Flomax

## 2017-06-30 NOTE — PHYSICAL THERAPY INITIAL EVALUATION ADULT - PERTINENT HX OF CURRENT PROBLEM, REHAB EVAL
Pt presented to ER due to acute onset increasing  SOB . Pt is diagnosed with CHF. CTA of chest  on 6/29/17 results confirmed  small bilateral pleural effusion ; right is larger than left.

## 2017-06-30 NOTE — OCCUPATIONAL THERAPY INITIAL EVALUATION ADULT - RANGE OF MOTION EXAMINATION, UPPER EXTREMITY
Left UE Passive ROM was WNL (within normal limits)/Left UE Active ROM was WNL (within normal limits)/AAROM in right shoulder 0-30,  elbow , wrist and digits are WFL

## 2017-06-30 NOTE — OCCUPATIONAL THERAPY INITIAL EVALUATION ADULT - ADDITIONAL COMMENTS
Prior to admission, pt was functioning in his  roles, self sufficient & ambulating independently without any assistive devices with supervision . Pt does not utilize oxygen at home ; presently , pt needs more assistance with self care tasks and functional mobility due to SOB  upon exertion . Pt able to reach out of base of support in sitting without loss of balance but unable to do it in standing. . The scale below depicts a picture of the pt's current level of functioning. Barthel Index: Feeding Score___5___, Bathing Score_0_____, Grooming Score_____0, Dressing Score__5___, Bowel Score__5___, Bladder Score____5__, Toilet Score___5__, Transfer Score__10____, Mobility Score__10___, Stairs Score___0__, Total Score__45/100___. Prior to admission, pt was functioning in his  roles and  ambulating independently without any assistive devices with supervision . Pt does not utilize oxygen at home ; presently , pt needs more assistance with self care tasks and functional mobility due to SOB  upon exertion . Pt able to reach out of base of support in sitting without loss of balance but unable to do it in standing. . The scale below depicts a picture of the pt's current level of functioning. Barthel Index: Feeding Score___5___, Bathing Score_0_____, Grooming Score_____0, Dressing Score__5___, Bowel Score__5___, Bladder Score____5__, Toilet Score___5__, Transfer Score__10____, Mobility Score__10___, Stairs Score___0__, Total Score__45/100___.

## 2017-06-30 NOTE — OCCUPATIONAL THERAPY INITIAL EVALUATION ADULT - TRANSFER SAFETY CONCERNS NOTED: BED/CHAIR, REHAB EVAL
decreased weight-shifting ability/decreased proprioception/losing balance/decreased sequencing ability/decreased step length

## 2017-06-30 NOTE — OCCUPATIONAL THERAPY INITIAL EVALUATION ADULT - LIVES WITH, PROFILE
his son  in a private house with 5 steps to enter with bilateral hand rails ; pt has no other steps to negotiate inside; pt's bathroom  has a tub and shower combination and I equipped with shower chair, grab  bars and  raised toilet seat; pt has a HHA  Monday to Friday from  9AM  to 12PM his son  in a private house with 5 steps to enter with bilateral hand rails ; pt has no other steps to negotiate inside; pt's bathroom  has a tub and shower combination and is equipped with shower chair, grab  bars and  raised toilet seat; pt has a HHA  Monday to Friday from  9AM  to 12PM

## 2017-06-30 NOTE — OCCUPATIONAL THERAPY INITIAL EVALUATION ADULT - PLANNED THERAPY INTERVENTIONS, OT EVAL
IADL retraining/energy conservation techniques/ADL retraining/bed mobility training/fine motor coordination training/massage/ROM/joint mobilization/neuromuscular re-education/transfer training/strengthening/stretching/parent/caregiver training.../cognitive, visual perceptual/balance training/motor coordination training

## 2017-06-30 NOTE — DIETITIAN INITIAL EVALUATION ADULT. - NS AS NUTRI INTERV ED CONTENT
Nutrition relationship to health/disease/Provided educational material for family reference/Recommended modifications

## 2017-06-30 NOTE — DIETITIAN INITIAL EVALUATION ADULT. - PERTINENT LABORATORY DATA
06-30 Na140 mmol/L Glu 90 mg/dL K+ 3.7 mmol/L Cr  0.87 mg/dL BUN 11 mg/dL Phos n/a   Alb n/a   PAB n/a, HgbA1c 6.6H

## 2017-06-30 NOTE — DIETITIAN INITIAL EVALUATION ADULT. - PHYSICAL APPEARANCE
well nourished/BMI 22.4; no edema noted; unable to conduct NFPA but no physical signs of malnutrition observed

## 2017-06-30 NOTE — INPATIENT CERTIFICATION FOR MEDICARE PATIENTS - RISKS OF ADVERSE EVENTS
Concern for worsening infectious process/Concern for cardiopulmonary deterioration/Concern for delay in diagnosis and treatment

## 2017-06-30 NOTE — OCCUPATIONAL THERAPY INITIAL EVALUATION ADULT - PERTINENT HX OF CURRENT PROBLEM, REHAB EVAL
Pt presented to ER due to acute onset increasing  SOB . Pt is diagnosed with CHF. CTA of chest  on 6/29/17 results confirm ed  small bilateral pleural effusion ; right is larger than left. Pt presented to ER due to acute onset increasing  SOB . Pt is diagnosed with CHF. CTA of chest  on 6/29/17 results confirmed  small bilateral pleural effusion ; right is larger than left.

## 2017-06-30 NOTE — OCCUPATIONAL THERAPY INITIAL EVALUATION ADULT - GENERAL OBSERVATIONS, REHAB EVAL
Pt was seen for initial OT evaluation , encountered supine in be on oxygen via nasal; canula and on cardiac monitoring ; pt was AA&Ox confused at times, but, cooperative & followed commands. Pt presents with muscle weakness , decreased endurance, ROM , balance and functional mobility; Pt has difficulty moving RUE  due to pain  and s/p shoulder fracture  sustained in a fall in March 2017 ap per daughter Aparina

## 2017-06-30 NOTE — PHYSICAL THERAPY INITIAL EVALUATION ADULT - CRITERIA FOR SKILLED THERAPEUTIC INTERVENTIONS
impairments found/functional limitations in following categories/anticipated discharge recommendation/risk reduction/prevention

## 2017-06-30 NOTE — PHYSICAL THERAPY INITIAL EVALUATION ADULT - MODIFIED CLINICAL TEST OF SENSORY INTEGRATION IN BALANCE TEST
Barthel Index: Feeding Score _5__, Bathing Score _0__, Grooming Score _0__, Dressing Score __5_, Bowels Score _5__, Bladder Score __5_, Toilet Score __5_, Transfers Score _5__, Mobility Score __0_, Stairs Score _0__,     Total Score _30__

## 2017-06-30 NOTE — OCCUPATIONAL THERAPY INITIAL EVALUATION ADULT - PERSONAL SAFETY AND JUDGMENT, REHAB EVAL
at risk behaviors demonstrated/Pt needs verbal cues for safety awareness and  for hand an foot placements during transfer

## 2017-07-01 DIAGNOSIS — I47.2 VENTRICULAR TACHYCARDIA: ICD-10-CM

## 2017-07-01 LAB
ANION GAP SERPL CALC-SCNC: 10 MMOL/L — SIGNIFICANT CHANGE UP (ref 5–17)
BUN SERPL-MCNC: 11 MG/DL — SIGNIFICANT CHANGE UP (ref 7–23)
CALCIUM SERPL-MCNC: 9 MG/DL — SIGNIFICANT CHANGE UP (ref 8.5–10.1)
CHLORIDE SERPL-SCNC: 104 MMOL/L — SIGNIFICANT CHANGE UP (ref 96–108)
CO2 SERPL-SCNC: 27 MMOL/L — SIGNIFICANT CHANGE UP (ref 22–31)
CREAT SERPL-MCNC: 0.84 MG/DL — SIGNIFICANT CHANGE UP (ref 0.5–1.3)
GLUCOSE SERPL-MCNC: 78 MG/DL — SIGNIFICANT CHANGE UP (ref 70–99)
HCT VFR BLD CALC: 41.2 % — SIGNIFICANT CHANGE UP (ref 39–50)
HGB BLD-MCNC: 14.2 G/DL — SIGNIFICANT CHANGE UP (ref 13–17)
MAGNESIUM SERPL-MCNC: 2 MG/DL — SIGNIFICANT CHANGE UP (ref 1.6–2.6)
MCHC RBC-ENTMCNC: 27 PG — SIGNIFICANT CHANGE UP (ref 27–34)
MCHC RBC-ENTMCNC: 34.5 GM/DL — SIGNIFICANT CHANGE UP (ref 32–36)
MCV RBC AUTO: 78.4 FL — LOW (ref 80–100)
PHOSPHATE SERPL-MCNC: 3 MG/DL — SIGNIFICANT CHANGE UP (ref 2.5–4.5)
PLATELET # BLD AUTO: 147 K/UL — LOW (ref 150–400)
POTASSIUM SERPL-MCNC: 3.3 MMOL/L — LOW (ref 3.5–5.3)
POTASSIUM SERPL-SCNC: 3.3 MMOL/L — LOW (ref 3.5–5.3)
RBC # BLD: 5.26 M/UL — SIGNIFICANT CHANGE UP (ref 4.2–5.8)
RBC # FLD: 14.4 % — SIGNIFICANT CHANGE UP (ref 11–15)
SODIUM SERPL-SCNC: 141 MMOL/L — SIGNIFICANT CHANGE UP (ref 135–145)
TROPONIN I SERPL-MCNC: 0.06 NG/ML — HIGH (ref 0.01–0.04)
WBC # BLD: 6.5 K/UL — SIGNIFICANT CHANGE UP (ref 3.8–10.5)
WBC # FLD AUTO: 6.5 K/UL — SIGNIFICANT CHANGE UP (ref 3.8–10.5)

## 2017-07-01 PROCEDURE — 71010: CPT | Mod: 26

## 2017-07-01 RX ORDER — POTASSIUM CHLORIDE 20 MEQ
40 PACKET (EA) ORAL EVERY 4 HOURS
Qty: 0 | Refills: 0 | Status: COMPLETED | OUTPATIENT
Start: 2017-07-01 | End: 2017-07-01

## 2017-07-01 RX ORDER — AMIODARONE HYDROCHLORIDE 400 MG/1
200 TABLET ORAL EVERY 12 HOURS
Qty: 0 | Refills: 0 | Status: DISCONTINUED | OUTPATIENT
Start: 2017-07-01 | End: 2017-07-03

## 2017-07-01 RX ADMIN — LOSARTAN POTASSIUM 100 MILLIGRAM(S): 100 TABLET, FILM COATED ORAL at 05:55

## 2017-07-01 RX ADMIN — ATORVASTATIN CALCIUM 40 MILLIGRAM(S): 80 TABLET, FILM COATED ORAL at 22:14

## 2017-07-01 RX ADMIN — Medication 325 MILLIGRAM(S): at 11:19

## 2017-07-01 RX ADMIN — Medication 4: at 17:06

## 2017-07-01 RX ADMIN — MEMANTINE HYDROCHLORIDE 10 MILLIGRAM(S): 10 TABLET ORAL at 17:04

## 2017-07-01 RX ADMIN — AMIODARONE HYDROCHLORIDE 200 MILLIGRAM(S): 400 TABLET ORAL at 17:13

## 2017-07-01 RX ADMIN — AMIODARONE HYDROCHLORIDE 200 MILLIGRAM(S): 400 TABLET ORAL at 06:00

## 2017-07-01 RX ADMIN — MONTELUKAST 10 MILLIGRAM(S): 4 TABLET, CHEWABLE ORAL at 11:20

## 2017-07-01 RX ADMIN — TAMSULOSIN HYDROCHLORIDE 0.4 MILLIGRAM(S): 0.4 CAPSULE ORAL at 22:15

## 2017-07-01 RX ADMIN — Medication 50 MILLIGRAM(S): at 22:15

## 2017-07-01 RX ADMIN — Medication 40 MILLIEQUIVALENT(S): at 15:06

## 2017-07-01 RX ADMIN — PANTOPRAZOLE SODIUM 40 MILLIGRAM(S): 20 TABLET, DELAYED RELEASE ORAL at 05:56

## 2017-07-01 RX ADMIN — AZITHROMYCIN 255 MILLIGRAM(S): 500 TABLET, FILM COATED ORAL at 16:30

## 2017-07-01 RX ADMIN — ENOXAPARIN SODIUM 40 MILLIGRAM(S): 100 INJECTION SUBCUTANEOUS at 11:20

## 2017-07-01 RX ADMIN — Medication 500 MILLIGRAM(S): at 11:20

## 2017-07-01 RX ADMIN — GABAPENTIN 200 MILLIGRAM(S): 400 CAPSULE ORAL at 22:14

## 2017-07-01 RX ADMIN — Medication 40 MILLIGRAM(S): at 05:57

## 2017-07-01 RX ADMIN — Medication 40 MILLIEQUIVALENT(S): at 17:04

## 2017-07-01 RX ADMIN — MEMANTINE HYDROCHLORIDE 10 MILLIGRAM(S): 10 TABLET ORAL at 05:56

## 2017-07-01 RX ADMIN — CEFTRIAXONE 100 GRAM(S): 500 INJECTION, POWDER, FOR SOLUTION INTRAMUSCULAR; INTRAVENOUS at 15:02

## 2017-07-01 RX ADMIN — INSULIN GLARGINE 15 UNIT(S): 100 INJECTION, SOLUTION SUBCUTANEOUS at 22:14

## 2017-07-02 LAB
ANION GAP SERPL CALC-SCNC: 9 MMOL/L — SIGNIFICANT CHANGE UP (ref 5–17)
BUN SERPL-MCNC: 9 MG/DL — SIGNIFICANT CHANGE UP (ref 7–23)
CALCIUM SERPL-MCNC: 8.8 MG/DL — SIGNIFICANT CHANGE UP (ref 8.5–10.1)
CHLORIDE SERPL-SCNC: 106 MMOL/L — SIGNIFICANT CHANGE UP (ref 96–108)
CO2 SERPL-SCNC: 27 MMOL/L — SIGNIFICANT CHANGE UP (ref 22–31)
CREAT SERPL-MCNC: 0.88 MG/DL — SIGNIFICANT CHANGE UP (ref 0.5–1.3)
GLUCOSE SERPL-MCNC: 91 MG/DL — SIGNIFICANT CHANGE UP (ref 70–99)
HCT VFR BLD CALC: 39.3 % — SIGNIFICANT CHANGE UP (ref 39–50)
HGB BLD-MCNC: 13.4 G/DL — SIGNIFICANT CHANGE UP (ref 13–17)
MCHC RBC-ENTMCNC: 26.5 PG — LOW (ref 27–34)
MCHC RBC-ENTMCNC: 34 GM/DL — SIGNIFICANT CHANGE UP (ref 32–36)
MCV RBC AUTO: 78.2 FL — LOW (ref 80–100)
PLATELET # BLD AUTO: 133 K/UL — LOW (ref 150–400)
POTASSIUM SERPL-MCNC: 3.9 MMOL/L — SIGNIFICANT CHANGE UP (ref 3.5–5.3)
POTASSIUM SERPL-SCNC: 3.9 MMOL/L — SIGNIFICANT CHANGE UP (ref 3.5–5.3)
RBC # BLD: 5.03 M/UL — SIGNIFICANT CHANGE UP (ref 4.2–5.8)
RBC # FLD: 13.9 % — SIGNIFICANT CHANGE UP (ref 11–15)
SODIUM SERPL-SCNC: 142 MMOL/L — SIGNIFICANT CHANGE UP (ref 135–145)
WBC # BLD: 7.9 K/UL — SIGNIFICANT CHANGE UP (ref 3.8–10.5)
WBC # FLD AUTO: 7.9 K/UL — SIGNIFICANT CHANGE UP (ref 3.8–10.5)

## 2017-07-02 RX ORDER — FUROSEMIDE 40 MG
40 TABLET ORAL DAILY
Qty: 0 | Refills: 0 | Status: DISCONTINUED | OUTPATIENT
Start: 2017-07-02 | End: 2017-07-03

## 2017-07-02 RX ADMIN — Medication 40 MILLIGRAM(S): at 18:54

## 2017-07-02 RX ADMIN — PANTOPRAZOLE SODIUM 40 MILLIGRAM(S): 20 TABLET, DELAYED RELEASE ORAL at 06:20

## 2017-07-02 RX ADMIN — Medication 50 MILLIGRAM(S): at 22:11

## 2017-07-02 RX ADMIN — CEFTRIAXONE 100 GRAM(S): 500 INJECTION, POWDER, FOR SOLUTION INTRAMUSCULAR; INTRAVENOUS at 15:52

## 2017-07-02 RX ADMIN — MONTELUKAST 10 MILLIGRAM(S): 4 TABLET, CHEWABLE ORAL at 11:30

## 2017-07-02 RX ADMIN — MEMANTINE HYDROCHLORIDE 10 MILLIGRAM(S): 10 TABLET ORAL at 06:20

## 2017-07-02 RX ADMIN — AZITHROMYCIN 255 MILLIGRAM(S): 500 TABLET, FILM COATED ORAL at 15:02

## 2017-07-02 RX ADMIN — TAMSULOSIN HYDROCHLORIDE 0.4 MILLIGRAM(S): 0.4 CAPSULE ORAL at 22:10

## 2017-07-02 RX ADMIN — Medication 500 MILLIGRAM(S): at 11:30

## 2017-07-02 RX ADMIN — ENOXAPARIN SODIUM 40 MILLIGRAM(S): 100 INJECTION SUBCUTANEOUS at 11:30

## 2017-07-02 RX ADMIN — Medication 2: at 11:41

## 2017-07-02 RX ADMIN — ATORVASTATIN CALCIUM 40 MILLIGRAM(S): 80 TABLET, FILM COATED ORAL at 22:09

## 2017-07-02 RX ADMIN — AMIODARONE HYDROCHLORIDE 200 MILLIGRAM(S): 400 TABLET ORAL at 06:20

## 2017-07-02 RX ADMIN — AMIODARONE HYDROCHLORIDE 200 MILLIGRAM(S): 400 TABLET ORAL at 18:38

## 2017-07-02 RX ADMIN — INSULIN GLARGINE 15 UNIT(S): 100 INJECTION, SOLUTION SUBCUTANEOUS at 22:09

## 2017-07-02 RX ADMIN — Medication 4: at 17:15

## 2017-07-02 RX ADMIN — Medication 325 MILLIGRAM(S): at 11:30

## 2017-07-02 RX ADMIN — MEMANTINE HYDROCHLORIDE 10 MILLIGRAM(S): 10 TABLET ORAL at 17:16

## 2017-07-02 RX ADMIN — Medication 40 MILLIGRAM(S): at 06:20

## 2017-07-02 RX ADMIN — GABAPENTIN 200 MILLIGRAM(S): 400 CAPSULE ORAL at 22:09

## 2017-07-02 NOTE — DISCHARGE NOTE ADULT - MEDICATION SUMMARY - MEDICATIONS TO TAKE
I will START or STAY ON the medications listed below when I get home from the hospital:    aspirin 325 mg oral tablet  -- 1 tab(s) by mouth once a day  -- Indication: For NSTEMI (non-ST elevated myocardial infarction)    losartan 100 mg oral tablet  -- 1 tab(s) by mouth once a day  -- Indication: For Acute systolic heart failure    amiodarone 200 mg oral tablet  -- 1 tab(s) by mouth every 12 hours  -- Indication: For Ventricular tachycardia    Crestor 40 mg oral tablet  -- 1 tab(s) by mouth once a day (at bedtime)  -- Indication: For NSTEMI (non-ST elevated myocardial infarction)    metoprolol tartrate 50 mg oral tablet  -- 1 tab(s) by mouth 2 times a day  -- Indication: For Acute systolic heart failure    furosemide 40 mg oral tablet  -- 1 tab(s) by mouth once a day  -- Indication: For Acute systolic heart failure    Augmentin 875 mg-125 mg oral tablet  -- 875 milligram(s) by mouth every 12 hours  -- Finish all this medication unless otherwise directed by prescriber.  Take with food or milk.    -- Indication: For Pneumonia of right lower lobe due to other aerobic Gram-negative bacteria

## 2017-07-02 NOTE — DISCHARGE NOTE ADULT - CARE PROVIDER_API CALL
Alie Flores), Medicine  [address] [facility] [address] [place] [address]  Phone: [phone]  Fax: [phone]    Adi Eldridge), Internal Medicine  [address] [facility] [address] [place] [address]  Phone: [phone]  Fax: [phone]

## 2017-07-02 NOTE — PROGRESS NOTE ADULT - ATTENDING COMMENTS
Repeat CXR in am.  Continue IV Lasix.  PT consult,  Continue current care and treatment.
Continue current care and treatment.
Continue current care and treatment.

## 2017-07-02 NOTE — DISCHARGE NOTE ADULT - HOSPITAL COURSE
78yo, [place] with h/o  dementia, HTN, HL, DM2, ACD x 2 who presents to the ED with  increasing SOB worsening last night, associated with 2 pillow orthopnea and PND.  Notes cough for 4-5 days.  Denies CP,  palpitation, n/v/d, fever, chills, weakness, abdominal pain ,dysuria.   IN ER CXR, CHF and elevated troponin 0.054  Admitted for CHF, PNA, NSTEMi develop NSVT, followed by Cardiology, treated with ASA, Stain, IV Lasix, IV Zithro, Rocephin, O2.  Started on Amiodarone, CXR showed resolution, the patient improved clinically and was discharged home.

## 2017-07-02 NOTE — DISCHARGE NOTE ADULT - NS AS DC HF EDUCATION INSTRUCTIONS
Call Primary Care Provider for follow-up after discharge/Report weight gain of 2 or more pounds in one day or 3 or more pounds in one week, worsening shortness of breath, fatigue, weakness, increased swelling of hands and feet to primary care provider/Monitor Weight Daily/Activities as tolerated/Low salt diet

## 2017-07-02 NOTE — DISCHARGE NOTE ADULT - SECONDARY DIAGNOSIS.
NSTEMI (non-ST elevated myocardial infarction) Ventricular tachycardia Pneumonia of right lower lobe due to other aerobic Gram-negative bacteria

## 2017-07-02 NOTE — DISCHARGE NOTE ADULT - FINDINGS/TREATMENT
13.4   7.9   )-----------( 133      ( 02 Jul 2017 06:41 )             39.3 PROCEDURE DATE:  07/01/2017        INTERPRETATION:  CLINICAL INFORMATION: CHF and pneumonia. Shortness of   breath.    A single portable view of the chest was obtained.     Comparison: CT chest 6/29/2017.     The mediastinal cardiac silhouette is unremarkable. Aortic calcifications.    Blunting of the costophrenic angles bilaterally consistent with pleural   fluid and/or atelectasis. The lungs are otherwise clear.    No acute osseous finding.       < end of copied text >

## 2017-07-02 NOTE — DISCHARGE NOTE ADULT - ADDITIONAL INSTRUCTIONS
Please follow up with your primary care physician regarding your hospitalization in 1 week.  Cardiologist in 1 week.

## 2017-07-02 NOTE — DISCHARGE NOTE ADULT - CARE PLAN
Principal Discharge DX:	Acute systolic heart failure  Goal:	resolve  Instructions for follow-up, activity and diet:	Medication and Cardiac followup.  Secondary Diagnosis:	NSTEMI (non-ST elevated myocardial infarction)  Goal:	resolve  Instructions for follow-up, activity and diet:	As above  Secondary Diagnosis:	Ventricular tachycardia  Goal:	resolve  Instructions for follow-up, activity and diet:	As above  Secondary Diagnosis:	Pneumonia of right lower lobe due to other aerobic Gram-negative bacteria  Goal:	resolve  Instructions for follow-up, activity and diet:	Abx

## 2017-07-02 NOTE — DISCHARGE NOTE ADULT - PATIENT PORTAL LINK FT
“You can access the [facility] Patient Portal, offered by [facility], by registering with the following website: [email or web address]”

## 2017-07-02 NOTE — DISCHARGE NOTE ADULT - OTHER SIGNIFICANT FINDINGS
< from: TTE Echo Doppler w/o Cont (06.30.17 @ 10:05) >   Patient Name:   FREDA RIVERA Patient Location: [facility] Rec #:  [id number]     Accession #:     [id number]  Account #:                     Height:           66.9 in 170.0 cm  YOB: 1940       Weight:           154.3 lb 70.00 kg  Patient Age:    77 years       BSA:              1.81 m²  Patient Gender: M              BP:      131/81 mmHg        Date of Exam: 6/30/2017 9:28:11 AM  Sonographer:  BRIANNE     Procedure:     2D Echo/Doppler/Color Doppler Complete.  Indications:   Heart failure, unspecified - I50.9; Dyspnea, unspecified -   R06.00  Diagnosis:     Cardiomyopathy, unspecified - I42.9  Study Details: Technically adequate study.           2D AND M-MODE MEASUREMENTS (normal ranges within parentheses):  Left Ventricle:                 Normal    Aorta/Left Atrium:           Normal  IVSd (2D):              1.05 cm(0.7-1.1) Aortic Root (2D):  2.70 cm   (2.4-3.7)  LVPWd (2D):             1.06 cm (0.7-1.1) Left Atrium (2D):  4.61 cm   (1.9-4.0)  LVIDd (2D):             4.87 cm (3.4-5.7) Right Ventricle:  LVIDs (2D):             3.69 cm           TAPSE:2.32 cm  LV FS (2D):             24.3 %  (>25%)  Relative Wall Thickness 0.44    (<0.42)     LV DIASTOLIC FUNCTION:  MV Peak E: 0.95 m/s Decel Time: 132 msec  MV Peak A: 0.64 m/s  E/A Ratio: 1.47     SPECTRAL DOPPLER ANALYSIS (where applicable):  Mitral Valve:  MV P1/2 Time: 38.31 msec  MV Area, PHT: 5.74 cm²      PHYSICIAN INTERPRETATION:  Left Ventricle: The left ventricular internal cavity size is normal.  Left ventricular ejection fraction, by visual estimation, is <20%.  Right Ventricle: The right ventricular size is normal. RV systolic   function is mildly reduced.  Left Atrium: Moderately enlarged left atrium.  Right Atrium: The right atrium is normal in size.  Pericardium: There is no evidence of pericardial effusion.  Mitral Valve: Structurally normal mitral valve, with normal leaflet   excursion. Thickening and calcification of the anterior and posterior   mitral valve leaflets. Moderate mitral valve regurgitation is seen.  Tricuspid Valve: Structurally normal tricuspid valve, with normal leaflet   excursion. The tricuspid valve is normal in structure. Moderate tricuspid   regurgitation is visualized. Estimated pulmonary artery systolic pressure   is 39.6 mmHg assuming a right atrial pressure of 5 mmHg, which is   consistent with borderline pulmonary hypertension.  Aortic Valve: Sclerotic aortic valve with decreased opening. Mild aortic   valve regurgitation is seen.  Pulmonic Valve: Structurally normal pulmonic valve, with normal leaflet   excursion.  Aorta:The aortic root is normal in size and structure.  Pulmonary Artery: The main pulmonary artery is normal in size.        Summary:   1. Left ventricular ejection fraction, by visual estimation, is <20%.   2. Thickening and calcification of the anteriorand posterior mitral   valve leaflets.   3. Moderate tricuspid regurgitation.   4. Mild aortic regurgitation.   5. Sclerotic aortic valve with decreased opening.   6. Estimated pulmonary artery systolic pressure is 39.6 mmHg assuming a   right atrialpressure of 5 mmHg, which is consistent with borderline   pulmonary hypertension.   7. Aortic valve does not appearly significantly stenotic by 2-D imaging,   but consideration of low-flow, low gradient aortic stenosis snoudl be   undertaken.   8. Moderately enlarged left atrium.     [id number] El Chirinos MD  Electronically signed on 6/30/2017 at 4:40:08 PM                *** Final ***

## 2017-07-03 VITALS
OXYGEN SATURATION: 95 % | DIASTOLIC BLOOD PRESSURE: 57 MMHG | SYSTOLIC BLOOD PRESSURE: 103 MMHG | RESPIRATION RATE: 18 BRPM | HEART RATE: 71 BPM | TEMPERATURE: 99 F

## 2017-07-03 RX ORDER — METOPROLOL TARTRATE 50 MG
1 TABLET ORAL
Qty: 0 | Refills: 0 | COMMUNITY
Start: 2017-07-03

## 2017-07-03 RX ORDER — LOSARTAN POTASSIUM 100 MG/1
1 TABLET, FILM COATED ORAL
Qty: 0 | Refills: 0 | COMMUNITY
Start: 2017-07-03

## 2017-07-03 RX ORDER — AMIODARONE HYDROCHLORIDE 400 MG/1
1 TABLET ORAL
Qty: 60 | Refills: 1 | OUTPATIENT
Start: 2017-07-03 | End: 2017-08-31

## 2017-07-03 RX ORDER — FUROSEMIDE 40 MG
1 TABLET ORAL
Qty: 30 | Refills: 1 | OUTPATIENT
Start: 2017-07-03 | End: 2017-08-31

## 2017-07-03 RX ORDER — TAMSULOSIN HYDROCHLORIDE 0.4 MG/1
1 CAPSULE ORAL
Qty: 0 | Refills: 0 | COMMUNITY

## 2017-07-03 RX ORDER — ASPIRIN/CALCIUM CARB/MAGNESIUM 324 MG
1 TABLET ORAL
Qty: 90 | Refills: 0 | OUTPATIENT
Start: 2017-07-03 | End: 2017-10-01

## 2017-07-03 RX ORDER — DOXAZOSIN MESYLATE 4 MG
1 TABLET ORAL
Qty: 0 | Refills: 0 | COMMUNITY

## 2017-07-03 RX ADMIN — AMIODARONE HYDROCHLORIDE 200 MILLIGRAM(S): 400 TABLET ORAL at 06:11

## 2017-07-03 RX ADMIN — Medication 325 MILLIGRAM(S): at 11:15

## 2017-07-03 RX ADMIN — MEMANTINE HYDROCHLORIDE 10 MILLIGRAM(S): 10 TABLET ORAL at 06:11

## 2017-07-03 RX ADMIN — ENOXAPARIN SODIUM 40 MILLIGRAM(S): 100 INJECTION SUBCUTANEOUS at 11:15

## 2017-07-03 RX ADMIN — LOSARTAN POTASSIUM 100 MILLIGRAM(S): 100 TABLET, FILM COATED ORAL at 06:11

## 2017-07-03 RX ADMIN — Medication 500 MILLIGRAM(S): at 12:09

## 2017-07-03 RX ADMIN — Medication 50 MILLIGRAM(S): at 11:15

## 2017-07-03 RX ADMIN — PANTOPRAZOLE SODIUM 40 MILLIGRAM(S): 20 TABLET, DELAYED RELEASE ORAL at 06:11

## 2017-07-03 RX ADMIN — MONTELUKAST 10 MILLIGRAM(S): 4 TABLET, CHEWABLE ORAL at 11:15

## 2017-07-03 RX ADMIN — Medication 40 MILLIGRAM(S): at 06:11

## 2017-07-03 NOTE — PROGRESS NOTE ADULT - PROBLEM SELECTOR PROBLEM 2
NSTEMI (non-ST elevated myocardial infarction)
Acute congestive heart failure, unspecified congestive heart failure type
Acute systolic heart failure
Ventricular tachycardia

## 2017-07-03 NOTE — PROGRESS NOTE ADULT - SUBJECTIVE AND OBJECTIVE BOX
Patient is a 77y old  Male who presents with a chief complaint of Shortness of breath (2017 14:39)        PAST MEDICAL & SURGICAL HISTORY:  Dementia  CAD (Coronary Artery Disease): s/p 4 stents (1 in &#x27;02, 1 in &#x27;04, 2 in &#x27;11)  PUD (Peptic Ulcer Disease)  CVA (Cerebral Infarction)  DM (Diabetes Mellitus)  HTN - Hypertension  Coronary Stent  History of Cholecystectomy      Summary of admission HPI:                PREVIOUS DIAGNOSTIC TESTING:      ECHO  FINDINGS:    STRESS  FINDINGS:    CATHETERIZATION  FINDINGS:    ELECTROPHYSIOLOGY STUDY  FINDINGS:    CAROTID ULTRASOUND:  FINDINGS    VENOUS DUPLEX SCAN:  FINDINGS:    CHEST CT PULMONARY ANGIO with IV Contrast:  FINDINGS:  MEDICATIONS  (STANDING):  losartan 100 milliGRAM(s) Oral daily  tamsulosin 0.4 milliGRAM(s) Oral at bedtime  gabapentin 200 milliGRAM(s) Oral at bedtime  atorvastatin 40 milliGRAM(s) Oral at bedtime  metoprolol 50 milliGRAM(s) Oral two times a day  montelukast 10 milliGRAM(s) Oral daily  memantine 10 milliGRAM(s) Oral two times a day  ascorbic acid 500 milliGRAM(s) Oral daily  enoxaparin Injectable 40 milliGRAM(s) SubCutaneous every 24 hours  furosemide   Injectable 40 milliGRAM(s) IV Push daily  insulin lispro (HumaLOG) corrective regimen sliding scale   SubCutaneous three times a day before meals  dextrose 5%. 1000 milliLiter(s) (50 mL/Hr) IV Continuous <Continuous>  dextrose 50% Injectable 12.5 Gram(s) IV Push once  dextrose 50% Injectable 25 Gram(s) IV Push once  dextrose 50% Injectable 25 Gram(s) IV Push once  aspirin 325 milliGRAM(s) Oral daily  insulin glargine Injectable (LANTUS) 15 Unit(s) SubCutaneous at bedtime  pantoprazole    Tablet 40 milliGRAM(s) Oral before breakfast  azithromycin  IVPB 500 milliGRAM(s) IV Intermittent every 24 hours  cefTRIAXone   IVPB 1 Gram(s) IV Intermittent every 24 hours  cefTRIAXone   IVPB   IV Intermittent   azithromycin  IVPB   IV Intermittent     MEDICATIONS  (PRN):  dextrose Gel 1 Dose(s) Oral once PRN Blood Glucose LESS THAN 70 milliGRAM(s)/deciliter  glucagon  Injectable 1 milliGRAM(s) IntraMuscular once PRN Glucose LESS THAN 70 milligrams/deciliter      FAMILY HISTORY:      SOCIAL HISTORY:    CIGARETTES:    ALCOHOL:    REVIEW OF SYSTEMS:    CONSTITUTIONAL: No fever, weight loss, chills, shakes, or fatigue  EYES: No eye pain, visual disturbances, or discharge  ENMT:  No difficulty hearing, tinnitus, vertigo; No sinus or throat pain  NECK: No pain or stiffness  BREASTS: No pain, masses, or nipple discharge  RESPIRATORY: No cough, wheezing, hemoptysis, or shortness of breath  CARDIOVASCULAR: No chest pain, dyspnea, palpitations, dizziness, syncope, paroxysmal nocturnal dyspnea, orthopnea, or arm or leg swelling  GASTROINTESTINAL: No abdominal  or epigastric pain, nausea, vomiting, hematemesis, diarrhea, constipation, melena or bright red blood.  GENITOURINARY: No dysuria, nocturia, hematuria, or urinary incontinence  NEUROLOGICAL: No headaches, memory loss, slurred speech, limb weakness, loss of strength, numbness, or tremors  SKIN: No itching, burning, rashes, or lesions   LYMPH NODES: No enlarged glands  ENDOCRINE: No heat or cold intolerance, or hair loss  MUSCULOSKELETAL: No joint pain or swelling, muscle, back, or extremity pain  PSYCHIATRIC: No depression, anxiety, or difficulty sleeping  HEME/LYMPH: No easy bruising or bleeding gums  ALLERY AND IMMUNOLOGIC: No hives or rash.      Vital Signs Last 24 Hrs  T(C): 36.7 (2017 16:29), Max: 36.7 (2017 16:29)  T(F): 98 (2017 16:29), Max: 98 (2017 16:29)  HR: 64 (2017 19:25) (58 - 76)  BP: 116/64 (2017 19:25) (114/73 - 145/67)  BP(mean): --  RR: 18 (2017 19:25) (18 - 19)  SpO2: 98% (2017 19:25) (98% - 100%)    PHYSICAL EXAM:    GENERAL: In no apparent distress, well nourished, and hydrated.  HEAD:  Atraumatic, Normocephalic  EYES: EOMI, PERRLA, conjunctiva and sclera clear  ENMT: No tonsillar erythema, exudates, or enlargement; Moist mucous membranes, Good dentition, No lesions  NECK: Supple and normal thyroid.  No JVD or carotid bruit.  Carotid pulse is 2+ bilaterally.  HEART: Regular rate and rhythm; No murmurs, rubs, or gallops.  PULMONARY: Clear to auscultation and perfusion.  No rales, wheezing, or rhonchi bilaterally.  ABDOMEN: Soft, Nontender, Nondistended; Bowel sounds present  EXTREMITIES:  2+ Peripheral Pulses, No clubbing, cyanosis, or edema  LYMPH: No lymphadenopathy noted  NEUROLOGICAL: Grossly nonfocal          INTERPRETATION OF TELEMETRY:    ECG:    I&O's Detail    2017 07:01  -  2017 21:31  --------------------------------------------------------  IN:    Oral Fluid: 160 mL    Solution: 250 mL    Solution: 50 mL  Total IN: 460 mL    OUT:  Total OUT: 0 mL    Total NET: 460 mL          LABS:                        13.3   7.0   )-----------( 130      ( 2017 04:27 )             39.2     06-30    140  |  106  |  11  ----------------------------<  90  3.7   |  24  |  0.87    Ca    8.8      2017 03:09    TPro  7.1  /  Alb  3.0<L>  /  TBili  0.8  /  DBili  x   /  AST  23  /  ALT  15  /  AlkPhos  96  06-29    CARDIAC MARKERS ( 2017 03:09 )  .049 ng/mL / x     / 31 U/L / x     / x      CARDIAC MARKERS ( 2017 20:16 )  .056 ng/mL / x     / 26 U/L / x     / x      CARDIAC MARKERS ( 2017 11:48 )  .046 ng/mL / x     / 33 U/L / x     / x      CARDIAC MARKERS ( 2017 08:29 )  .054 ng/mL / x     / x     / x     / 1.7 ng/mL      PT/INR - ( 2017 08:46 )   PT: 10.8 sec;   INR: 0.99 ratio         PTT - ( 2017 08:46 )  PTT:27.2 sec    BNP  I&O's Detail    2017 07:01  -  2017 21:31  --------------------------------------------------------  IN:    Oral Fluid: 160 mL    Solution: 250 mL    Solution: 50 mL  Total IN: 460 mL    OUT:  Total OUT: 0 mL    Total NET: 460 mL        Daily Height in cm: 170.18 (2017 22:15)    Daily Weight in k.7 (2017 15:01)    RADIOLOGY & ADDITIONAL STUDIES:
Patient is a 77y old  Male who presents with a chief complaint of Shortness of breath (2017 14:39)        PAST MEDICAL & SURGICAL HISTORY:  Dementia  CAD (Coronary Artery Disease): s/p 4 stents (1 in &#x27;02, 1 in &#x27;04, 2 in &#x27;11)  PUD (Peptic Ulcer Disease)  CVA (Cerebral Infarction)  DM (Diabetes Mellitus)  HTN - Hypertension  Coronary Stent  History of Cholecystectomy      Summary of admission HPI: NSTEMI                PREVIOUS DIAGNOSTIC TESTING:      ECHO  FINDINGS:    STRESS  FINDINGS:    CATHETERIZATION  FINDINGS:    ELECTROPHYSIOLOGY STUDY  FINDINGS:    CAROTID ULTRASOUND:  FINDINGS    VENOUS DUPLEX SCAN:  FINDINGS:    CHEST CT PULMONARY ANGIO with IV Contrast:  FINDINGS:  MEDICATIONS  (STANDING):  losartan 100 milliGRAM(s) Oral daily  tamsulosin 0.4 milliGRAM(s) Oral at bedtime  gabapentin 200 milliGRAM(s) Oral at bedtime  atorvastatin 40 milliGRAM(s) Oral at bedtime  metoprolol 50 milliGRAM(s) Oral two times a day  montelukast 10 milliGRAM(s) Oral daily  memantine 10 milliGRAM(s) Oral two times a day  ascorbic acid 500 milliGRAM(s) Oral daily  enoxaparin Injectable 40 milliGRAM(s) SubCutaneous every 24 hours  furosemide   Injectable 40 milliGRAM(s) IV Push daily  insulin lispro (HumaLOG) corrective regimen sliding scale   SubCutaneous three times a day before meals  dextrose 5%. 1000 milliLiter(s) (50 mL/Hr) IV Continuous <Continuous>  dextrose 50% Injectable 12.5 Gram(s) IV Push once  dextrose 50% Injectable 25 Gram(s) IV Push once  dextrose 50% Injectable 25 Gram(s) IV Push once  aspirin 325 milliGRAM(s) Oral daily  insulin glargine Injectable (LANTUS) 15 Unit(s) SubCutaneous at bedtime  pantoprazole    Tablet 40 milliGRAM(s) Oral before breakfast  azithromycin  IVPB 500 milliGRAM(s) IV Intermittent every 24 hours  cefTRIAXone   IVPB 1 Gram(s) IV Intermittent every 24 hours  cefTRIAXone   IVPB   IV Intermittent   azithromycin  IVPB   IV Intermittent   amiodarone    Tablet 200 milliGRAM(s) Oral every 12 hours    MEDICATIONS  (PRN):  dextrose Gel 1 Dose(s) Oral once PRN Blood Glucose LESS THAN 70 milliGRAM(s)/deciliter  glucagon  Injectable 1 milliGRAM(s) IntraMuscular once PRN Glucose LESS THAN 70 milligrams/deciliter      FAMILY HISTORY:      SOCIAL HISTORY:    CIGARETTES:    ALCOHOL:    REVIEW OF SYSTEMS:    CONSTITUTIONAL: No fever, weight loss, chills, shakes, or fatigue  EYES: No eye pain, visual disturbances, or discharge  ENMT:  No difficulty hearing, tinnitus, vertigo; No sinus or throat pain  NECK: No pain or stiffness  BREASTS: No pain, masses, or nipple discharge  RESPIRATORY: No cough, wheezing, hemoptysis, or shortness of breath  CARDIOVASCULAR: No chest pain, dyspnea, palpitations, dizziness, syncope, paroxysmal nocturnal dyspnea, orthopnea, or arm or leg swelling  GASTROINTESTINAL: No abdominal  or epigastric pain, nausea, vomiting, hematemesis, diarrhea, constipation, melena or bright red blood.  GENITOURINARY: No dysuria, nocturia, hematuria, or urinary incontinence  NEUROLOGICAL: No headaches, memory loss, slurred speech, limb weakness, loss of strength, numbness, or tremors  SKIN: No itching, burning, rashes, or lesions   LYMPH NODES: No enlarged glands  ENDOCRINE: No heat or cold intolerance, or hair loss  MUSCULOSKELETAL: No joint pain or swelling, muscle, back, or extremity pain  PSYCHIATRIC: No depression, anxiety, or difficulty sleeping  HEME/LYMPH: No easy bruising or bleeding gums  ALLERY AND IMMUNOLOGIC: No hives or rash.      Vital Signs Last 24 Hrs  T(C): 37.1 (2017 05:45), Max: 37.1 (2017 23:27)  T(F): 98.8 (2017 05:45), Max: 98.8 (2017 23:27)  HR: 76 (2017 07:44) (60 - 76)  BP: 126/67 (2017 05:45) (114/73 - 145/67)  BP(mean): --  RR: 18 (2017 05:45) (18 - 18)  SpO2: 97% (2017 05:45) (97% - 100%)    PHYSICAL EXAM:    GENERAL: In no apparent distress, well nourished, and hydrated.  HEAD:  Atraumatic, Normocephalic  EYES: EOMI, PERRLA, conjunctiva and sclera clear  ENMT: No tonsillar erythema, exudates, or enlargement; Moist mucous membranes, Good dentition, No lesions  NECK: Supple and normal thyroid.  No JVD or carotid bruit.  Carotid pulse is 2+ bilaterally.  HEART: Regular rate and rhythm; No murmurs, rubs, or gallops.  PULMONARY: Clear to auscultation and perfusion.  No rales, wheezing, or rhonchi bilaterally.  ABDOMEN: Soft, Nontender, Nondistended; Bowel sounds present  EXTREMITIES:  2+ Peripheral Pulses, No clubbing, cyanosis, or edema  LYMPH: No lymphadenopathy noted  NEUROLOGICAL: Grossly nonfocal          INTERPRETATION OF TELEMETRY:    ECG:    I&O's Detail    2017 07:  -  2017 07:00  --------------------------------------------------------  IN:    Oral Fluid: 160 mL    Solution: 250 mL    Solution: 50 mL  Total IN: 460 mL    OUT:    Voided: 800 mL  Total OUT: 800 mL    Total NET: -340 mL          LABS:                        14.2   6.5   )-----------( 147      ( 2017 07:32 )             41.2     07-01    141  |  104  |  11  ----------------------------<  78  3.3<L>   |  27  |  0.84    Ca    9.0      2017 07:32  Phos  3.0     07-01  Mg     2.0     07-01      CARDIAC MARKERS ( 2017 07:32 )  .059 ng/mL / x     / x     / x     / x      CARDIAC MARKERS ( 2017 03:09 )  .049 ng/mL / x     / 31 U/L / x     / x      CARDIAC MARKERS ( 2017 20:16 )  .056 ng/mL / x     / 26 U/L / x     / x      CARDIAC MARKERS ( 2017 11:48 )  .046 ng/mL / x     / 33 U/L / x     / x              BNP  I&O's Detail    2017 07:  -  2017 07:00  --------------------------------------------------------  IN:    Oral Fluid: 160 mL    Solution: 250 mL    Solution: 50 mL  Total IN: 460 mL    OUT:    Voided: 800 mL  Total OUT: 800 mL    Total NET: -340 mL        Daily     Daily Weight in k.1 (2017 05:45)    RADIOLOGY & ADDITIONAL STUDIES:
Patient is a 77y old  Male who presents with a chief complaint of Shortness of breath (2017 18:47)        PAST MEDICAL & SURGICAL HISTORY:  Dementia  CAD (Coronary Artery Disease): s/p 4 stents (1 in &#x27;02, 1 in &#x27;04, 2 in &#x27;11)  PUD (Peptic Ulcer Disease)  CVA (Cerebral Infarction)  DM (Diabetes Mellitus)  HTN - Hypertension  Coronary Stent  History of Cholecystectomy      Summary of admission HPI: NSTEMI                PREVIOUS DIAGNOSTIC TESTING:      ECHO  FINDINGS:    STRESS  FINDINGS:    CATHETERIZATION  FINDINGS:    ELECTROPHYSIOLOGY STUDY  FINDINGS:    CAROTID ULTRASOUND:  FINDINGS    VENOUS DUPLEX SCAN:  FINDINGS:    CHEST CT PULMONARY ANGIO with IV Contrast:  FINDINGS:  MEDICATIONS  (STANDING):  losartan 100 milliGRAM(s) Oral daily  tamsulosin 0.4 milliGRAM(s) Oral at bedtime  gabapentin 200 milliGRAM(s) Oral at bedtime  atorvastatin 40 milliGRAM(s) Oral at bedtime  metoprolol 50 milliGRAM(s) Oral two times a day  montelukast 10 milliGRAM(s) Oral daily  memantine 10 milliGRAM(s) Oral two times a day  ascorbic acid 500 milliGRAM(s) Oral daily  enoxaparin Injectable 40 milliGRAM(s) SubCutaneous every 24 hours  insulin lispro (HumaLOG) corrective regimen sliding scale   SubCutaneous three times a day before meals  dextrose 5%. 1000 milliLiter(s) (50 mL/Hr) IV Continuous <Continuous>  dextrose 50% Injectable 12.5 Gram(s) IV Push once  dextrose 50% Injectable 25 Gram(s) IV Push once  dextrose 50% Injectable 25 Gram(s) IV Push once  aspirin 325 milliGRAM(s) Oral daily  insulin glargine Injectable (LANTUS) 15 Unit(s) SubCutaneous at bedtime  pantoprazole    Tablet 40 milliGRAM(s) Oral before breakfast  azithromycin  IVPB 500 milliGRAM(s) IV Intermittent every 24 hours  cefTRIAXone   IVPB 1 Gram(s) IV Intermittent every 24 hours  cefTRIAXone   IVPB   IV Intermittent   azithromycin  IVPB   IV Intermittent   amiodarone    Tablet 200 milliGRAM(s) Oral every 12 hours  furosemide    Tablet 40 milliGRAM(s) Oral daily    MEDICATIONS  (PRN):  dextrose Gel 1 Dose(s) Oral once PRN Blood Glucose LESS THAN 70 milliGRAM(s)/deciliter  glucagon  Injectable 1 milliGRAM(s) IntraMuscular once PRN Glucose LESS THAN 70 milligrams/deciliter      FAMILY HISTORY:      SOCIAL HISTORY:    CIGARETTES:    ALCOHOL:    REVIEW OF SYSTEMS:    CONSTITUTIONAL: No fever, weight loss, chills, shakes, or fatigue  EYES: No eye pain, visual disturbances, or discharge  ENMT:  No difficulty hearing, tinnitus, vertigo; No sinus or throat pain  NECK: No pain or stiffness  BREASTS: No pain, masses, or nipple discharge  RESPIRATORY: No cough, wheezing, hemoptysis, or shortness of breath  CARDIOVASCULAR: No chest pain, dyspnea, palpitations, dizziness, syncope, paroxysmal nocturnal dyspnea, orthopnea, or arm or leg swelling  GASTROINTESTINAL: No abdominal  or epigastric pain, nausea, vomiting, hematemesis, diarrhea, constipation, melena or bright red blood.  GENITOURINARY: No dysuria, nocturia, hematuria, or urinary incontinence  NEUROLOGICAL: No headaches, memory loss, slurred speech, limb weakness, loss of strength, numbness, or tremors  SKIN: No itching, burning, rashes, or lesions   LYMPH NODES: No enlarged glands  ENDOCRINE: No heat or cold intolerance, or hair loss  MUSCULOSKELETAL: No joint pain or swelling, muscle, back, or extremity pain  PSYCHIATRIC: No depression, anxiety, or difficulty sleeping  HEME/LYMPH: No easy bruising or bleeding gums  ALLERY AND IMMUNOLOGIC: No hives or rash.      Vital Signs Last 24 Hrs  T(C): 37.1 (2017 11:10), Max: 37.1 (2017 23:44)  T(F): 98.8 (2017 11:10), Max: 98.8 (2017 23:44)  HR: 71 (2017 11:10) (65 - 71)  BP: 103/57 (2017 11:10) (103/57 - 120/71)  BP(mean): --  RR: 18 (2017 11:10) (17 - 18)  SpO2: 95% (2017 11:10) (94% - 100%)    PHYSICAL EXAM:    GENERAL: In no apparent distress, well nourished, and hydrated.  HEAD:  Atraumatic, Normocephalic  EYES: EOMI, PERRLA, conjunctiva and sclera clear  ENMT: No tonsillar erythema, exudates, or enlargement; Moist mucous membranes, Good dentition, No lesions  NECK: Supple and normal thyroid.  No JVD or carotid bruit.  Carotid pulse is 2+ bilaterally.  HEART: Regular rate and rhythm; No murmurs, rubs, or gallops.  PULMONARY: Clear to auscultation and perfusion.  No rales, wheezing, or rhonchi bilaterally.  ABDOMEN: Soft, Nontender, Nondistended; Bowel sounds present  EXTREMITIES:  2+ Peripheral Pulses, No clubbing, cyanosis, or edema  LYMPH: No lymphadenopathy noted  NEUROLOGICAL: Grossly nonfocal          INTERPRETATION OF TELEMETRY:    ECG:    I&O's Detail    2017 07:  -  2017 07:00  --------------------------------------------------------  IN:    Oral Fluid: 720 mL  Total IN: 720 mL    OUT:    Voided: 500 mL  Total OUT: 500 mL    Total NET: 220 mL      2017 07:  -  2017 14:07  --------------------------------------------------------  IN:    Oral Fluid: 600 mL  Total IN: 600 mL    OUT:  Total OUT: 0 mL    Total NET: 600 mL          LABS:                        13.4   7.9   )-----------( 133      ( 2017 06:41 )             39.3     07-02    142  |  106  |  9   ----------------------------<  91  3.9   |  27  |  0.88    Ca    8.8      2017 06:41              BNP  I&O's Detail    2017 07:  -  2017 07:00  --------------------------------------------------------  IN:    Oral Fluid: 720 mL  Total IN: 720 mL    OUT:    Voided: 500 mL  Total OUT: 500 mL    Total NET: 220 mL      2017 07:01  -  2017 14:07  --------------------------------------------------------  IN:    Oral Fluid: 600 mL  Total IN: 600 mL    OUT:  Total OUT: 0 mL    Total NET: 600 mL        Daily     Daily Weight in k.4 (2017 07:17)    RADIOLOGY & ADDITIONAL STUDIES:
Patient is a 77y old  Male who presents with a chief complaint of Shortness of breath (2017 14:39)      SUBJECTIVE/OBJECTIVE:  Last night monitor showed 7 beat V Tachy.  Without complaints. Patient resting comfortably.         MEDICATIONS  (STANDING):  losartan 100 milliGRAM(s) Oral daily  tamsulosin 0.4 milliGRAM(s) Oral at bedtime  gabapentin 200 milliGRAM(s) Oral at bedtime  atorvastatin 40 milliGRAM(s) Oral at bedtime  metoprolol 50 milliGRAM(s) Oral two times a day  montelukast 10 milliGRAM(s) Oral daily  memantine 10 milliGRAM(s) Oral two times a day  ascorbic acid 500 milliGRAM(s) Oral daily  enoxaparin Injectable 40 milliGRAM(s) SubCutaneous every 24 hours  furosemide   Injectable 40 milliGRAM(s) IV Push daily  insulin lispro (HumaLOG) corrective regimen sliding scale   SubCutaneous three times a day before meals  dextrose 5%. 1000 milliLiter(s) (50 mL/Hr) IV Continuous <Continuous>  dextrose 50% Injectable 12.5 Gram(s) IV Push once  dextrose 50% Injectable 25 Gram(s) IV Push once  dextrose 50% Injectable 25 Gram(s) IV Push once  aspirin 325 milliGRAM(s) Oral daily  insulin glargine Injectable (LANTUS) 15 Unit(s) SubCutaneous at bedtime  pantoprazole    Tablet 40 milliGRAM(s) Oral before breakfast  azithromycin  IVPB 500 milliGRAM(s) IV Intermittent every 24 hours  cefTRIAXone   IVPB 1 Gram(s) IV Intermittent every 24 hours  cefTRIAXone   IVPB   IV Intermittent   azithromycin  IVPB   IV Intermittent   amiodarone    Tablet 200 milliGRAM(s) Oral every 12 hours  potassium chloride    Tablet ER 40 milliEquivalent(s) Oral every 4 hours    MEDICATIONS  (PRN):  dextrose Gel 1 Dose(s) Oral once PRN Blood Glucose LESS THAN 70 milliGRAM(s)/deciliter  glucagon  Injectable 1 milliGRAM(s) IntraMuscular once PRN Glucose LESS THAN 70 milligrams/deciliter      Allergies    No Known Allergies    Intolerances          Vital Signs Last 24 Hrs  T(C): 37.1 (2017 11:40), Max: 37.1 (2017 23:27)  T(F): 98.8 (2017 11:40), Max: 98.8 (2017 23:27)  HR: 76 (2017 11:40) (64 - 76)  BP: 100/62 (2017 11:40) (100/62 - 126/67)  BP(mean): --  RR: 18 (2017 11:40) (18 - 18)  SpO2: 97% (2017 11:40) (97% - 98%)    PHYSICAL EXAM:  GENERAL: NAD, well-groomed, well-developed  HEAD:  Atraumatic, Normocephalic  EYES: EOMI, PERRLA, conjunctiva and sclera clear  ENMT: No tonsillar erythema, exudates, or enlargement; Moist mucous membranes, No lesions  NECK: Supple, No JVD, Normal thyroid  NERVOUS SYSTEM:  Alert & Oriented X3; Motor Strength 5/5 B/L upper and lower extremities; DTRs 2+ intact and symmetric  CHEST/LUNG: BS decrease base bilaterally; No rales, rhonchi, wheezing, or rubs  HEART: Regular rate and rhythm; No murmurs, rubs, or gallops  ABDOMEN: Soft, Nontender, Nondistended; Bowel sounds present  EXTREMITIES:  2+ Peripheral Pulses, No clubbing, cyanosis, or edema  LYMPH: No lymphadenopathy noted  SKIN: No rashes or lesions    LABS:                        14.2   6.5   )-----------( 147      ( 2017 07:32 )             41.2     0701    141  |  104  |  11  ----------------------------<  78  3.3<L>   |  27  |  0.84    Ca    9.0      2017 07:32  Phos  3.0     07-  Mg     2.0     07-01          CAPILLARY BLOOD GLUCOSE  102 (2017 11:27)  88 (2017 07:44)  159 (2017 23:04)  117 (2017 15:44)        CARDIAC MARKERS ( 2017 07:32 )  .059 ng/mL / x     / x     / x     / x      CARDIAC MARKERS ( 2017 03:09 )  .049 ng/mL / x     / 31 U/L / x     / x      CARDIAC MARKERS ( 2017 20:16 )  .056 ng/mL / x     / 26 U/L / x     / x            RADIOLOGY & ADDITIONAL TESTS:    < from: Xray Chest 1 View AP/PA. (17 @ 08:04) >    EXAM:  CHEST SINGLE VIEW                            PROCEDURE DATE:  2017        INTERPRETATION:  CLINICAL INFORMATION: CHF and pneumonia. Shortness of   breath.    A single portable view of the chest was obtained.     Comparison: CT chest 2017.     The mediastinal cardiac silhouette is unremarkable. Aortic calcifications.    Blunting of the costophrenic angles bilaterally consistent with pleural   fluid and/or atelectasis. The lungs are otherwise clear.    No acute osseous finding.     IMPRESSION:    Findings as above.          < end of copied text >  < from: TTE Echo Doppler w/o Cont (17 @ 10:05) >     EXAM:  TTE WO CON COMPLETE W DOPPL         PROCEDURE DATE:  2017        INTERPRETATION:  REPORT:    TRANSTHORACIC ECHOCARDIOGRAM REPORT           Patient Name:   FREDA RIVERA Patient Location: [facility] Rec #:  [id number]     Accession #:     [id number]  Account #:                     Height:           66.9 in 170.0 cm  YOB: 1940       Weight:           154.3 lb 70.00 kg  Patient Age:    77 years       BSA:              1.81 m²  Patient Gender: M              BP:      131/81 mmHg        Date of Exam: 2017 9:28:11 AM  Sonographer:  BRIANNE     Procedure:     2D Echo/Doppler/Color Doppler Complete.  Indications:   Heart failure, unspecified - I50.9; Dyspnea, unspecified -   R06.00  Diagnosis:     Cardiomyopathy, unspecified - I42.9  Study Details: Technically adequate study.           2D AND M-MODE MEASUREMENTS (normal ranges within parentheses):  Left Ventricle:                 Normal    Aorta/Left Atrium:           Normal  IVSd (2D):              1.05 cm(0.7-1.1) Aortic Root (2D):  2.70 cm   (2.4-3.7)  LVPWd (2D):             1.06 cm (0.7-1.1) Left Atrium (2D):  4.61 cm   (1.9-4.0)  LVIDd (2D):             4.87 cm (3.4-5.7) Right Ventricle:  LVIDs (2D):             3.69 cm           TAPSE:2.32 cm  LV FS (2D):             24.3 %  (>25%)  Relative Wall Thickness 0.44    (<0.42)     LV DIASTOLIC FUNCTION:  MV Peak E: 0.95 m/s Decel Time: 132 msec  MV Peak A: 0.64 m/s  E/A Ratio: 1.47     SPECTRAL DOPPLER ANALYSIS (where applicable):  Mitral Valve:  MV P1/2 Time: 38.31 msec  MV Area, PHT: 5.74 cm²     Aortic Valve: AoV Max Chad: 1.27 m/s AoV Peak P.5 mmHg AoV Mean PG:   3.8 mmHg     LVOT Vmax: 0.53 m/s LVOT VTI: 0.096 m LVOT Diameter:     Tricuspid Valve and PA/RV Systolic Pressure: TR Max Velocity: 2.94 m/s   RA Pressure: 5 mmHg RVSP/PASP: 39.6 mmHg        PHYSICIAN INTERPRETATION:  Left Ventricle: The left ventricular internal cavity size is normal.  Left ventricular ejection fraction, by visual estimation, is <20%.  Right Ventricle: The right ventricular size is normal. RV systolic   function is mildly reduced.  Left Atrium: Moderately enlarged left atrium.  Right Atrium: The right atrium is normal in size.  Pericardium: There is no evidence of pericardial effusion.  Mitral Valve: Structurally normal mitral valve, with normal leaflet   excursion. Thickening and calcification of the anterior and posterior   mitral valve leaflets. Moderate mitral valve regurgitation is seen.  Tricuspid Valve: Structurally normal tricuspid valve, with normal leaflet   excursion. The tricuspid valve is normal in structure. Moderate tricuspid   regurgitation is visualized. Estimated pulmonary artery systolic pressure   is 39.6 mmHg assuming a right atrial pressure of 5 mmHg, which is   consistent with borderline pulmonary hypertension.  Aortic Valve: Sclerotic aortic valve with decreased opening. Mild aortic   valve regurgitation is seen.  Pulmonic Valve: Structurally normal pulmonic valve, with normal leaflet   excursion.  Aorta:The aortic root is normal in size and structure.  Pulmonary Artery: The main pulmonary artery is normal in size.        Summary:   1. Left ventricular ejection fraction, by visual estimation, is <20%.   2. Thickening and calcification of the anteriorand posterior mitral   valve leaflets.   3. Moderate tricuspid regurgitation.   4. Mild aortic regurgitation.   5. Sclerotic aortic valve with decreased opening.   6. Estimated pulmonary artery systolic pressure is 39.6 mmHg assuming a   right atrialpressure of 5 mmHg, which is consistent with borderline   pulmonary hypertension.   7. Aortic valve does not appearly significantly stenotic by 2-D imaging,   but consideration of low-flow, low gradient aortic stenosis snoudl be   undertaken.   8. Moderately enlarged left atrium.     [id number] El Pelletier MD  Electronically signed on 2017 at 4:40:08 PM                *** Final ***                    EL PELLETIER M.D.; Attending Cardiologist  This document has been electronically signed.  [id number]  9:28AM        < end of copied text >      Consultant(s) Notes Reviewed:  [xxxx ] YES  [ ] NO
Patient is a 77y old  Male who presents with a chief complaint of Shortness of breath (29 Jun 2017 14:39)      SUBJECTIVE/OBJECTIVE:    Without complaints. Patient resting comfortably.       MEDICATIONS  (STANDING):  losartan 100 milliGRAM(s) Oral daily  tamsulosin 0.4 milliGRAM(s) Oral at bedtime  gabapentin 200 milliGRAM(s) Oral at bedtime  atorvastatin 40 milliGRAM(s) Oral at bedtime  metoprolol 50 milliGRAM(s) Oral two times a day  montelukast 10 milliGRAM(s) Oral daily  memantine 10 milliGRAM(s) Oral two times a day  ascorbic acid 500 milliGRAM(s) Oral daily  enoxaparin Injectable 40 milliGRAM(s) SubCutaneous every 24 hours  furosemide   Injectable 40 milliGRAM(s) IV Push daily  insulin lispro (HumaLOG) corrective regimen sliding scale   SubCutaneous three times a day before meals  dextrose 5%. 1000 milliLiter(s) (50 mL/Hr) IV Continuous <Continuous>  dextrose 50% Injectable 12.5 Gram(s) IV Push once  dextrose 50% Injectable 25 Gram(s) IV Push once  dextrose 50% Injectable 25 Gram(s) IV Push once  aspirin 325 milliGRAM(s) Oral daily  insulin glargine Injectable (LANTUS) 15 Unit(s) SubCutaneous at bedtime  pantoprazole    Tablet 40 milliGRAM(s) Oral before breakfast  azithromycin  IVPB 500 milliGRAM(s) IV Intermittent every 24 hours  cefTRIAXone   IVPB 1 Gram(s) IV Intermittent every 24 hours  cefTRIAXone   IVPB   IV Intermittent   azithromycin  IVPB   IV Intermittent     MEDICATIONS  (PRN):  dextrose Gel 1 Dose(s) Oral once PRN Blood Glucose LESS THAN 70 milliGRAM(s)/deciliter  glucagon  Injectable 1 milliGRAM(s) IntraMuscular once PRN Glucose LESS THAN 70 milligrams/deciliter      Allergies    No Known Allergies    Intolerances          Vital Signs Last 24 Hrs  T(C): 36.6 (30 Jun 2017 11:49), Max: 36.7 (29 Jun 2017 16:00)  T(F): 97.8 (30 Jun 2017 11:49), Max: 98.1 (29 Jun 2017 16:00)  HR: 67 (30 Jun 2017 11:49) (58 - 81)  BP: 145/67 (30 Jun 2017 11:49) (118/72 - 175/78)  BP(mean): --  RR: 18 (30 Jun 2017 11:49) (18 - 23)  SpO2: 98% (30 Jun 2017 11:49) (97% - 100%)    PHYSICAL EXAM:  GENERAL: NAD, well-groomed, well-developed  HEAD:  Atraumatic, Normocephalic  EYES: EOMI, PERRLA, conjunctiva and sclera clear  ENMT: No tonsillar erythema, exudates, or enlargement; Moist mucous membranes, No lesions  NECK: Supple, No JVD, Normal thyroid  NERVOUS SYSTEM:  Alert & Oriented X3; Motor Strength 5/5 B/L upper and lower extremities; DTRs 2+ intact and symmetric  CHEST/LUNG:  Few Rales bilaterally; No rhonchi, wheezing, or rubs  HEART: Regular rate and rhythm; No murmurs, rubs, or gallops  ABDOMEN: Soft, Nontender, Nondistended; Bowel sounds present  EXTREMITIES:  2+ Peripheral Pulses, No clubbing, cyanosis, or edema  LYMPH: No lymphadenopathy noted  SKIN: No rashes or lesions    LABS:                        13.3   7.0   )-----------( 130      ( 30 Jun 2017 04:27 )             39.2     06-30    140  |  106  |  11  ----------------------------<  90  3.7   |  24  |  0.87    Ca    8.8      30 Jun 2017 03:09    TPro  7.1  /  Alb  3.0<L>  /  TBili  0.8  /  DBili  x   /  AST  23  /  ALT  15  /  AlkPhos  96  06-29    PT/INR - ( 29 Jun 2017 08:46 )   PT: 10.8 sec;   INR: 0.99 ratio         PTT - ( 29 Jun 2017 08:46 )  PTT:27.2 sec    CAPILLARY BLOOD GLUCOSE  94 (30 Jun 2017 10:57)  92 (30 Jun 2017 07:47)  134 (29 Jun 2017 21:39)  125 (29 Jun 2017 16:21)        CARDIAC MARKERS ( 30 Jun 2017 03:09 )  .049 ng/mL / x     / 31 U/L / x     / x      CARDIAC MARKERS ( 29 Jun 2017 20:16 )  .056 ng/mL / x     / 26 U/L / x     / x      CARDIAC MARKERS ( 29 Jun 2017 11:48 )  .046 ng/mL / x     / 33 U/L / x     / x      CARDIAC MARKERS ( 29 Jun 2017 08:29 )  .054 ng/mL / x     / x     / x     / 1.7 ng/mL        RADIOLOGY & ADDITIONAL TESTS:    < from: CT Chest No Cont (06.29.17 @ 15:11) >    EXAM:  CT CHEST                            PROCEDURE DATE:  06/29/2017        INTERPRETATION:  CLINICAL INFORMATION: Congestive heart failure    COMPARISON: No prior CT studies are available for comparison.    PROCEDURE:     Serial axial sections through the chest were obtained without   administration of nonionic intravenous contrast. Sagittal and coronal   reformats were then generated from the axial images.    FINDINGS:     LUNG AND LARGE AIRWAYS: Bilateral lower lobe atelectasis. Branching   opacities in the lingula, possibly area of mucoid impaction. Scattered   calcified granulomas. Otherwise clear lungs and patent airways.  PLEURA: Small bilateral pleural effusions, right larger than left.  VESSELS: Atherosclerotic changes of the aorta and coronary arteries.  HEART: Enlarged. No pericardial effusion.  MEDIASTINUM AND MILTON: Prominent mediastinal and hilar lymph nodes.  CHEST WALL AND LOWER NECK: Within normal limits.    UPPER ABDOMEN: Status post cholecystectomy. Nonspecific perinephric   stranding. Colonic diverticulosis.    BONES: Spinal degenerative changes.    IMPRESSION:     Small bilateral pleural effusions, right larger than left.  No discrete mass identified.    < end of copied text >      Consultant(s) Notes Reviewed:  [xxx ] YES  [ ] NO
Patient is a 77y old  Male who presents with a chief complaint of Shortness of breath (29 Jun 2017 14:39)      SUBJECTIVE/OBJECTIVE:    Without complaints.  Feels better.    MEDICATIONS  (STANDING):  losartan 100 milliGRAM(s) Oral daily  tamsulosin 0.4 milliGRAM(s) Oral at bedtime  gabapentin 200 milliGRAM(s) Oral at bedtime  atorvastatin 40 milliGRAM(s) Oral at bedtime  metoprolol 50 milliGRAM(s) Oral two times a day  montelukast 10 milliGRAM(s) Oral daily  memantine 10 milliGRAM(s) Oral two times a day  ascorbic acid 500 milliGRAM(s) Oral daily  enoxaparin Injectable 40 milliGRAM(s) SubCutaneous every 24 hours  insulin lispro (HumaLOG) corrective regimen sliding scale   SubCutaneous three times a day before meals  dextrose 5%. 1000 milliLiter(s) (50 mL/Hr) IV Continuous <Continuous>  dextrose 50% Injectable 12.5 Gram(s) IV Push once  dextrose 50% Injectable 25 Gram(s) IV Push once  dextrose 50% Injectable 25 Gram(s) IV Push once  aspirin 325 milliGRAM(s) Oral daily  insulin glargine Injectable (LANTUS) 15 Unit(s) SubCutaneous at bedtime  pantoprazole    Tablet 40 milliGRAM(s) Oral before breakfast  azithromycin  IVPB 500 milliGRAM(s) IV Intermittent every 24 hours  cefTRIAXone   IVPB 1 Gram(s) IV Intermittent every 24 hours  cefTRIAXone   IVPB   IV Intermittent   azithromycin  IVPB   IV Intermittent   amiodarone    Tablet 200 milliGRAM(s) Oral every 12 hours    MEDICATIONS  (PRN):  dextrose Gel 1 Dose(s) Oral once PRN Blood Glucose LESS THAN 70 milliGRAM(s)/deciliter  glucagon  Injectable 1 milliGRAM(s) IntraMuscular once PRN Glucose LESS THAN 70 milligrams/deciliter      Allergies    No Known Allergies    Intolerances          Vital Signs Last 24 Hrs  T(C): 36.1 (02 Jul 2017 10:30), Max: 36.9 (01 Jul 2017 17:20)  T(F): 97 (02 Jul 2017 10:30), Max: 98.4 (01 Jul 2017 17:20)  HR: 71 (02 Jul 2017 10:30) (68 - 75)  BP: 100/57 (02 Jul 2017 10:30) (100/57 - 116/67)  BP(mean): --  RR: 18 (02 Jul 2017 10:30) (16 - 18)  SpO2: 99% (02 Jul 2017 10:30) (96% - 99%)    PHYSICAL EXAM:  GENERAL: NAD, well-groomed, well-developed  HEAD:  Atraumatic, Normocephalic  EYES: EOMI, PERRLA, conjunctiva and sclera clear  ENMT: No tonsillar erythema, exudates, or enlargement; Moist mucous membranes, No lesions  NECK: Supple, No JVD, Normal thyroid  NERVOUS SYSTEM:  Alert & Oriented X3; Motor Strength 5/5 B/L upper and lower extremities; DTRs 2+ intact and symmetric  CHEST/LUNG: Slight decrease in BS base; No rales, rhonchi, wheezing, or rubs  HEART: Regular rate and rhythm; No murmurs, rubs, or gallops  ABDOMEN: Soft, Nontender, Nondistended; Bowel sounds present  EXTREMITIES:  2+ Peripheral Pulses, No clubbing, cyanosis, or edema  LYMPH: No lymphadenopathy noted  SKIN: No rashes or lesions    LABS:                        13.4   7.9   )-----------( 133      ( 02 Jul 2017 06:41 )             39.3     07-02    142  |  106  |  9   ----------------------------<  91  3.9   |  27  |  0.88    Ca    8.8      02 Jul 2017 06:41  Phos  3.0     07-01  Mg     2.0     07-01          CAPILLARY BLOOD GLUCOSE  151 (02 Jul 2017 11:39)  135 (02 Jul 2017 08:12)  107 (01 Jul 2017 22:12)  237 (01 Jul 2017 16:54)        CARDIAC MARKERS ( 01 Jul 2017 07:32 )  .059 ng/mL / x     / x     / x     / x            RADIOLOGY & ADDITIONAL TESTS:        Consultant(s) Notes Reviewed:  [ ] YES  [ ] NO

## 2017-07-03 NOTE — PROGRESS NOTE ADULT - PROBLEM SELECTOR PROBLEM 1
Acute systolic heart failure
NSTEMI (non-ST elevated myocardial infarction)

## 2017-07-03 NOTE — PROGRESS NOTE ADULT - PROBLEM SELECTOR PLAN 1
Cardiac consult noted  IV Lasix, continue  Cozaar, Lopressor  O2 support.  TTE,
medical management
risk stratification  would need cardiac cath
risk stratification would need cardiac cath

## 2017-07-06 DIAGNOSIS — I21.4 NON-ST ELEVATION (NSTEMI) MYOCARDIAL INFARCTION: ICD-10-CM

## 2017-07-06 DIAGNOSIS — I11.0 HYPERTENSIVE HEART DISEASE WITH HEART FAILURE: ICD-10-CM

## 2017-07-06 DIAGNOSIS — I47.2 VENTRICULAR TACHYCARDIA: ICD-10-CM

## 2017-07-06 DIAGNOSIS — J15.6 PNEUMONIA DUE TO OTHER GRAM-NEGATIVE BACTERIA: ICD-10-CM

## 2017-07-06 DIAGNOSIS — Z86.73 PERSONAL HISTORY OF TRANSIENT ISCHEMIC ATTACK (TIA), AND CEREBRAL INFARCTION WITHOUT RESIDUAL DEFICITS: ICD-10-CM

## 2017-07-06 DIAGNOSIS — R06.02 SHORTNESS OF BREATH: ICD-10-CM

## 2017-07-06 DIAGNOSIS — Z95.5 PRESENCE OF CORONARY ANGIOPLASTY IMPLANT AND GRAFT: ICD-10-CM

## 2017-07-06 DIAGNOSIS — I50.21 ACUTE SYSTOLIC (CONGESTIVE) HEART FAILURE: ICD-10-CM

## 2017-07-06 DIAGNOSIS — I25.10 ATHEROSCLEROTIC HEART DISEASE OF NATIVE CORONARY ARTERY WITHOUT ANGINA PECTORIS: ICD-10-CM

## 2017-07-06 DIAGNOSIS — E11.9 TYPE 2 DIABETES MELLITUS WITHOUT COMPLICATIONS: ICD-10-CM

## 2017-07-27 ENCOUNTER — INPATIENT (INPATIENT)
Facility: HOSPITAL | Age: 77
LOS: 7 days | Discharge: NURSING, REHAB OR LONG-TERM CARE | End: 2017-08-04
Attending: INTERNAL MEDICINE | Admitting: INTERNAL MEDICINE
Payer: MEDICARE

## 2017-07-27 VITALS
HEART RATE: 56 BPM | DIASTOLIC BLOOD PRESSURE: 78 MMHG | OXYGEN SATURATION: 96 % | SYSTOLIC BLOOD PRESSURE: 143 MMHG | WEIGHT: 160.94 LBS | TEMPERATURE: 98 F | RESPIRATION RATE: 20 BRPM | HEIGHT: 67 IN

## 2017-07-27 PROBLEM — F03.90 UNSPECIFIED DEMENTIA, UNSPECIFIED SEVERITY, WITHOUT BEHAVIORAL DISTURBANCE, PSYCHOTIC DISTURBANCE, MOOD DISTURBANCE, AND ANXIETY: Chronic | Status: ACTIVE | Noted: 2017-06-29

## 2017-07-27 LAB
ALBUMIN SERPL ELPH-MCNC: 3.2 G/DL — LOW (ref 3.3–5)
ALP SERPL-CCNC: 108 U/L — SIGNIFICANT CHANGE UP (ref 40–120)
ALT FLD-CCNC: 19 U/L — SIGNIFICANT CHANGE UP (ref 12–78)
ANION GAP SERPL CALC-SCNC: 10 MMOL/L — SIGNIFICANT CHANGE UP (ref 5–17)
APPEARANCE UR: ABNORMAL
AST SERPL-CCNC: 24 U/L — SIGNIFICANT CHANGE UP (ref 15–37)
BASOPHILS # BLD AUTO: 0.1 K/UL — SIGNIFICANT CHANGE UP (ref 0–0.2)
BASOPHILS NFR BLD AUTO: 0.9 % — SIGNIFICANT CHANGE UP (ref 0–2)
BILIRUB SERPL-MCNC: 0.6 MG/DL — SIGNIFICANT CHANGE UP (ref 0.2–1.2)
BILIRUB UR-MCNC: NEGATIVE — SIGNIFICANT CHANGE UP
BUN SERPL-MCNC: 21 MG/DL — SIGNIFICANT CHANGE UP (ref 7–23)
CALCIUM SERPL-MCNC: 9 MG/DL — SIGNIFICANT CHANGE UP (ref 8.5–10.1)
CHLORIDE SERPL-SCNC: 107 MMOL/L — SIGNIFICANT CHANGE UP (ref 96–108)
CK MB BLD-MCNC: 3.7 % — HIGH (ref 0–3.5)
CK MB CFR SERPL CALC: 1.3 NG/ML — SIGNIFICANT CHANGE UP (ref 0.5–3.6)
CK SERPL-CCNC: 35 U/L — SIGNIFICANT CHANGE UP (ref 26–308)
CO2 SERPL-SCNC: 25 MMOL/L — SIGNIFICANT CHANGE UP (ref 22–31)
COLOR SPEC: YELLOW — SIGNIFICANT CHANGE UP
CREAT SERPL-MCNC: 1.13 MG/DL — SIGNIFICANT CHANGE UP (ref 0.5–1.3)
DIFF PNL FLD: NEGATIVE — SIGNIFICANT CHANGE UP
EOSINOPHIL # BLD AUTO: 0.1 K/UL — SIGNIFICANT CHANGE UP (ref 0–0.5)
EOSINOPHIL NFR BLD AUTO: 1.1 % — SIGNIFICANT CHANGE UP (ref 0–6)
EPI CELLS # UR: SIGNIFICANT CHANGE UP
GLUCOSE SERPL-MCNC: 141 MG/DL — HIGH (ref 70–99)
GLUCOSE UR QL: NEGATIVE MG/DL — SIGNIFICANT CHANGE UP
HCT VFR BLD CALC: 44.4 % — SIGNIFICANT CHANGE UP (ref 39–50)
HGB BLD-MCNC: 14.2 G/DL — SIGNIFICANT CHANGE UP (ref 13–17)
KETONES UR-MCNC: NEGATIVE — SIGNIFICANT CHANGE UP
LEUKOCYTE ESTERASE UR-ACNC: NEGATIVE — SIGNIFICANT CHANGE UP
LYMPHOCYTES # BLD AUTO: 2.3 K/UL — SIGNIFICANT CHANGE UP (ref 1–3.3)
LYMPHOCYTES # BLD AUTO: 22.4 % — SIGNIFICANT CHANGE UP (ref 13–44)
MCHC RBC-ENTMCNC: 26.3 PG — LOW (ref 27–34)
MCHC RBC-ENTMCNC: 31.9 GM/DL — LOW (ref 32–36)
MCV RBC AUTO: 82.3 FL — SIGNIFICANT CHANGE UP (ref 80–100)
MONOCYTES # BLD AUTO: 0.5 K/UL — SIGNIFICANT CHANGE UP (ref 0–0.9)
MONOCYTES NFR BLD AUTO: 5 % — SIGNIFICANT CHANGE UP (ref 2–14)
NEUTROPHILS # BLD AUTO: 7.4 K/UL — SIGNIFICANT CHANGE UP (ref 1.8–7.4)
NEUTROPHILS NFR BLD AUTO: 70.6 % — SIGNIFICANT CHANGE UP (ref 43–77)
NITRITE UR-MCNC: NEGATIVE — SIGNIFICANT CHANGE UP
PH UR: 6 — SIGNIFICANT CHANGE UP (ref 5–8)
PLATELET # BLD AUTO: 144 K/UL — LOW (ref 150–400)
POTASSIUM SERPL-MCNC: 3.3 MMOL/L — LOW (ref 3.5–5.3)
POTASSIUM SERPL-SCNC: 3.3 MMOL/L — LOW (ref 3.5–5.3)
PROT SERPL-MCNC: 7 GM/DL — SIGNIFICANT CHANGE UP (ref 6–8.3)
PROT UR-MCNC: 15 MG/DL
RBC # BLD: 5.4 M/UL — SIGNIFICANT CHANGE UP (ref 4.2–5.8)
RBC # FLD: 14.6 % — SIGNIFICANT CHANGE UP (ref 11–15)
SODIUM SERPL-SCNC: 142 MMOL/L — SIGNIFICANT CHANGE UP (ref 135–145)
SP GR SPEC: 1.01 — SIGNIFICANT CHANGE UP (ref 1.01–1.02)
TROPONIN I SERPL-MCNC: 0.15 NG/ML — HIGH (ref 0.01–0.04)
UROBILINOGEN FLD QL: NEGATIVE MG/DL — SIGNIFICANT CHANGE UP
WBC # BLD: 10.4 K/UL — SIGNIFICANT CHANGE UP (ref 3.8–10.5)
WBC # FLD AUTO: 10.4 K/UL — SIGNIFICANT CHANGE UP (ref 3.8–10.5)

## 2017-07-27 PROCEDURE — 93010 ELECTROCARDIOGRAM REPORT: CPT

## 2017-07-27 PROCEDURE — 70450 CT HEAD/BRAIN W/O DYE: CPT | Mod: 26

## 2017-07-27 PROCEDURE — 99291 CRITICAL CARE FIRST HOUR: CPT

## 2017-07-27 RX ORDER — SODIUM CHLORIDE 9 MG/ML
1000 INJECTION INTRAMUSCULAR; INTRAVENOUS; SUBCUTANEOUS
Qty: 0 | Refills: 0 | Status: DISCONTINUED | OUTPATIENT
Start: 2017-07-27 | End: 2017-07-28

## 2017-07-27 RX ORDER — PANTOPRAZOLE SODIUM 20 MG/1
40 TABLET, DELAYED RELEASE ORAL
Qty: 0 | Refills: 0 | Status: DISCONTINUED | OUTPATIENT
Start: 2017-07-27 | End: 2017-08-03

## 2017-07-27 RX ORDER — DEXTROSE 50 % IN WATER 50 %
25 SYRINGE (ML) INTRAVENOUS ONCE
Qty: 0 | Refills: 0 | Status: DISCONTINUED | OUTPATIENT
Start: 2017-07-27 | End: 2017-08-04

## 2017-07-27 RX ORDER — DEXTROSE 50 % IN WATER 50 %
1 SYRINGE (ML) INTRAVENOUS ONCE
Qty: 0 | Refills: 0 | Status: DISCONTINUED | OUTPATIENT
Start: 2017-07-27 | End: 2017-08-04

## 2017-07-27 RX ORDER — MONTELUKAST 4 MG/1
10 TABLET, CHEWABLE ORAL DAILY
Qty: 0 | Refills: 0 | Status: DISCONTINUED | OUTPATIENT
Start: 2017-07-27 | End: 2017-07-29

## 2017-07-27 RX ORDER — TAMSULOSIN HYDROCHLORIDE 0.4 MG/1
1 CAPSULE ORAL
Qty: 0 | Refills: 0 | COMMUNITY

## 2017-07-27 RX ORDER — GABAPENTIN 400 MG/1
300 CAPSULE ORAL THREE TIMES A DAY
Qty: 0 | Refills: 0 | Status: DISCONTINUED | OUTPATIENT
Start: 2017-07-27 | End: 2017-07-29

## 2017-07-27 RX ORDER — MONTELUKAST 4 MG/1
1 TABLET, CHEWABLE ORAL
Qty: 0 | Refills: 0 | COMMUNITY

## 2017-07-27 RX ORDER — AMIODARONE HYDROCHLORIDE 400 MG/1
200 TABLET ORAL EVERY 12 HOURS
Qty: 0 | Refills: 0 | Status: DISCONTINUED | OUTPATIENT
Start: 2017-07-27 | End: 2017-08-03

## 2017-07-27 RX ORDER — GABAPENTIN 400 MG/1
3 CAPSULE ORAL
Qty: 0 | Refills: 0 | COMMUNITY

## 2017-07-27 RX ORDER — ASPIRIN/CALCIUM CARB/MAGNESIUM 324 MG
300 TABLET ORAL ONCE
Qty: 0 | Refills: 0 | Status: COMPLETED | OUTPATIENT
Start: 2017-07-27 | End: 2017-07-27

## 2017-07-27 RX ORDER — SODIUM CHLORIDE 9 MG/ML
1000 INJECTION, SOLUTION INTRAVENOUS
Qty: 0 | Refills: 0 | Status: DISCONTINUED | OUTPATIENT
Start: 2017-07-27 | End: 2017-08-04

## 2017-07-27 RX ORDER — MEMANTINE HYDROCHLORIDE 10 MG/1
10 TABLET ORAL
Qty: 0 | Refills: 0 | Status: DISCONTINUED | OUTPATIENT
Start: 2017-07-27 | End: 2017-08-04

## 2017-07-27 RX ORDER — TAMSULOSIN HYDROCHLORIDE 0.4 MG/1
0.4 CAPSULE ORAL AT BEDTIME
Qty: 0 | Refills: 0 | Status: DISCONTINUED | OUTPATIENT
Start: 2017-07-27 | End: 2017-08-04

## 2017-07-27 RX ORDER — ATORVASTATIN CALCIUM 80 MG/1
40 TABLET, FILM COATED ORAL AT BEDTIME
Qty: 0 | Refills: 0 | Status: DISCONTINUED | OUTPATIENT
Start: 2017-07-27 | End: 2017-08-04

## 2017-07-27 RX ORDER — POTASSIUM CHLORIDE 20 MEQ
10 PACKET (EA) ORAL
Qty: 0 | Refills: 0 | Status: COMPLETED | OUTPATIENT
Start: 2017-07-27 | End: 2017-07-27

## 2017-07-27 RX ORDER — LOSARTAN POTASSIUM 100 MG/1
100 TABLET, FILM COATED ORAL DAILY
Qty: 0 | Refills: 0 | Status: DISCONTINUED | OUTPATIENT
Start: 2017-07-27 | End: 2017-08-03

## 2017-07-27 RX ORDER — ROSUVASTATIN CALCIUM 5 MG/1
1 TABLET ORAL
Qty: 0 | Refills: 0 | COMMUNITY

## 2017-07-27 RX ORDER — GLUCAGON INJECTION, SOLUTION 0.5 MG/.1ML
1 INJECTION, SOLUTION SUBCUTANEOUS ONCE
Qty: 0 | Refills: 0 | Status: DISCONTINUED | OUTPATIENT
Start: 2017-07-27 | End: 2017-08-04

## 2017-07-27 RX ORDER — DEXTROSE 50 % IN WATER 50 %
12.5 SYRINGE (ML) INTRAVENOUS ONCE
Qty: 0 | Refills: 0 | Status: DISCONTINUED | OUTPATIENT
Start: 2017-07-27 | End: 2017-08-04

## 2017-07-27 RX ORDER — MEMANTINE HYDROCHLORIDE 10 MG/1
1 TABLET ORAL
Qty: 0 | Refills: 0 | COMMUNITY

## 2017-07-27 RX ORDER — FUROSEMIDE 40 MG
40 TABLET ORAL DAILY
Qty: 0 | Refills: 0 | Status: DISCONTINUED | OUTPATIENT
Start: 2017-07-27 | End: 2017-08-03

## 2017-07-27 RX ORDER — INSULIN LISPRO 100/ML
VIAL (ML) SUBCUTANEOUS
Qty: 0 | Refills: 0 | Status: DISCONTINUED | OUTPATIENT
Start: 2017-07-27 | End: 2017-08-04

## 2017-07-27 RX ORDER — ESOMEPRAZOLE MAGNESIUM 40 MG/1
1 CAPSULE, DELAYED RELEASE ORAL
Qty: 0 | Refills: 0 | COMMUNITY

## 2017-07-27 RX ORDER — METOPROLOL TARTRATE 50 MG
50 TABLET ORAL
Qty: 0 | Refills: 0 | Status: DISCONTINUED | OUTPATIENT
Start: 2017-07-27 | End: 2017-08-03

## 2017-07-27 RX ADMIN — Medication 100 MILLIEQUIVALENT(S): at 20:59

## 2017-07-27 RX ADMIN — TAMSULOSIN HYDROCHLORIDE 0.4 MILLIGRAM(S): 0.4 CAPSULE ORAL at 21:20

## 2017-07-27 RX ADMIN — Medication 100 MILLIEQUIVALENT(S): at 22:53

## 2017-07-27 RX ADMIN — ATORVASTATIN CALCIUM 40 MILLIGRAM(S): 80 TABLET, FILM COATED ORAL at 21:20

## 2017-07-27 RX ADMIN — GABAPENTIN 300 MILLIGRAM(S): 400 CAPSULE ORAL at 21:21

## 2017-07-27 RX ADMIN — SODIUM CHLORIDE 60 MILLILITER(S): 9 INJECTION INTRAMUSCULAR; INTRAVENOUS; SUBCUTANEOUS at 20:00

## 2017-07-27 RX ADMIN — Medication 100 MILLIEQUIVALENT(S): at 21:58

## 2017-07-27 RX ADMIN — Medication 300 MILLIGRAM(S): at 11:11

## 2017-07-27 RX ADMIN — AMIODARONE HYDROCHLORIDE 200 MILLIGRAM(S): 400 TABLET ORAL at 19:00

## 2017-07-27 RX ADMIN — MEMANTINE HYDROCHLORIDE 10 MILLIGRAM(S): 10 TABLET ORAL at 19:00

## 2017-07-27 RX ADMIN — Medication 50 MILLIGRAM(S): at 19:00

## 2017-07-27 NOTE — ED PROVIDER NOTE - OBJECTIVE STATEMENT
77 year old male with PMH of DM II, HLD, HTN, CAD s/p 1 stent, CVA 1 month ago, dementia family unsure of laterality, but states normally pt awake and alert, lives alone, presenting to ED due to worsened mental status, facial droop on left and weakness of R arm/leg. Family states last seen normal last night. Brought in by EMS with FS noted outpt 120s, 77 year old male with PMH of DM II, HLD, HTN, CAD s/p 1 stent, CVA 1 month ago, dementia family unsure of laterality, but states normally pt awake and alert, lives alone, presenting to ED due to worsened mental status, facial droop on left and weakness of R arm/leg. Family states last seen normal last night. Brought in by EMS with FS noted outpt 120s.

## 2017-07-27 NOTE — PHYSICAL THERAPY INITIAL EVALUATION ADULT - GENERAL OBSERVATIONS, REHAB EVAL
Found supine, awake, not in distress, on room air, has heart monitor, right sided weakness from PMhx of CVA with residual weakness.

## 2017-07-27 NOTE — PHYSICAL THERAPY INITIAL EVALUATION ADULT - IMPAIRMENTS CONTRIBUTING TO GAIT DEVIATIONS, PT EVAL
impaired coordination/impaired motor control/impaired postural control/decreased strength/cognition/impaired balance

## 2017-07-27 NOTE — H&P ADULT - NSHPLABSRESULTS_GEN_ALL_CORE
14.2   10.4  )-----------( 144      ( 2017 09:34 )             44.4         142  |  107  |  21  ----------------------------<  141<H>  3.3<L>   |  25  |  1.13    Ca    9.0      2017 09:34    TPro  7.0  /  Alb  3.2<L>  /  TBili  0.6  /  DBili  x   /  AST  24  /  ALT  19  /  AlkPhos  108        Urinalysis Basic - ( 2017 10:51 )    Color: Yellow / Appearance: x / S.015 / pH: x  Gluc: x / Ketone: Negative  / Bili: Negative / Urobili: Negative mg/dL   Blood: x / Protein: 15 mg/dL / Nitrite: Negative   Leuk Esterase: Negative / RBC: x / WBC x   Sq Epi: x / Non Sq Epi: Few / Bacteria: x      CAPILLARY BLOOD GLUCOSE  129 (2017 09:02)

## 2017-07-27 NOTE — PHYSICAL THERAPY INITIAL EVALUATION ADULT - CRITERIA FOR SKILLED THERAPEUTIC INTERVENTIONS
functional limitations in following categories/impairments found/risk reduction/prevention/anticipated discharge recommendation

## 2017-07-27 NOTE — PHYSICAL THERAPY INITIAL EVALUATION ADULT - COORDINATION ASSESSED, REHAB EVAL
unable to assess. pt not following instruction/heel to shin/finger to nose finger to nose/heel to shin/unable to assess. pt not following instruction

## 2017-07-27 NOTE — PHYSICAL THERAPY INITIAL EVALUATION ADULT - IMPAIRED TRANSFERS: SIT/STAND, REHAB EVAL
decreased strength/impaired coordination/impaired balance/impaired postural control/impaired motor control

## 2017-07-27 NOTE — PHYSICAL THERAPY INITIAL EVALUATION ADULT - LIVES WITH, PROFILE
Lives in a private house with son and daughter in law, 5 steps to enter with rail, 1 flight inside the house with rails.

## 2017-07-27 NOTE — PHYSICAL THERAPY INITIAL EVALUATION ADULT - PLANNED THERAPY INTERVENTIONS, PT EVAL
balance training/postural re-education/bed mobility training/gait training/transfer training/strengthening/neuromuscular re-education

## 2017-07-27 NOTE — ED PROVIDER NOTE - CRITICAL CARE PROVIDED
interpretation of diagnostic studies/direct patient care (not related to procedure)/consultation with other physicians/consult w/ pt's family directly relating to pts condition

## 2017-07-27 NOTE — ED ADULT TRIAGE NOTE - CHIEF COMPLAINT QUOTE
patient nonverbal at this time , as per daughter patient had a stroke 1 month ago , but he was talking and walking after the stroke , as per daughter she found the patient nonverbal in the bed , patient has a R side weakness and facial droop , last time seen patient OK was last night , unknown onset of symptoms

## 2017-07-27 NOTE — PHYSICAL THERAPY INITIAL EVALUATION ADULT - IMPAIRMENTS FOUND, PT EVAL
ergonomics and body mechanics/arousal, attention, and cognition/aerobic capacity/endurance/decreased midline orientation/fine motor/muscle strength/gross motor/poor safety awareness/posture/gait, locomotion, and balance/cognitive impairment

## 2017-07-27 NOTE — CONSULT NOTE ADULT - SUBJECTIVE AND OBJECTIVE BOX
Historian:     Patient unable.  ER notes reviewed.  Brain CT:  1)  acute left MCA territory infarct with intraluminal thrombus in the proximal left M2 region of the middle cerebral artery.  2)  extensive chronic ischemic changes and multiple old infarcts again noted as well.    HPI:    FREDA RIVERA.   · Chief Complaint: The patient is a 77y Male complaining of weakness.  HPI Objective Statement: 77 year old male with PMH of DM II, HLD, HTN, CAD s/p 1 stent, CVA 1 month ago, dementia family unsure of laterality, but states normally pt awake and alert, lives alone, presenting to ED due to worsened mental status, facial droop on left and weakness of R arm/leg. Family states last seen normal last night. Brought in by EMS with FS noted outpt 120s.    Case d/w :  Advised to check for [facility] opinion regarding intraluminal thrombus in the proximal left M2 region of the middle cerebral artery.  Dr. Joe spoke to Intensivist and advised continued care here in [place].  See Dr. Joe's notes.     PAST MEDICAL & SURGICAL HISTORY:  Dementia; CAD (Coronary Artery Disease): s/p 4 stents (1 in &#x27;02, 1 in &#x27;04, 2 in &#x27;11);   PUD (Peptic Ulcer Disease); CVA (Cerebral Infarction); DM (Diabetes Mellitus); HTN - Hypertension; Coronary Stent; History of Cholecystectomy    MEDICATIONS  (STANDING):    MEDICATIONS  (PRN):  Alergies: No Known Allergies  Intolerances  FAMILY HISTORY:    Vital Signs Last 24 Hrs  T(C): 36.9 (2017 10:48), Max: 36.9 (2017 10:48)  T(F): 98.4 (2017 10:48), Max: 98.4 (2017 10:48)  HR: 58 (2017 10:48) (56 - 58)  BP: 158/86 (2017 10:48) (143/78 - 158/86)  BP(mean): --  RR: 18 (2017 10:48) (18 - 20)  SpO2: 96% (2017 10:48) (96% - 96%)    NEUROLOGICAL EXAM:  HENT:  Normocephalic head; atraumatic head.  Neck supple.  ENT: normal looking.  Mental State:    Alert.  Fully oriented to person, place and date.  Coherent.  Speech clear and intact.  Cooperative.  Responds appropriately.    Cranial Nerves:  II-XII:   Pupils round and reactive to light and accommodation.  Extraocular movements full.  Visual fields full (no homonymous hemianopsia).  Visual acuity wnl.  Facial symmetry intact.  Tongue midline.  Motor Functions:  Moves all extremities.  No pronator drift of UE.  Claps hand well.  Hand  intact bilaterally.  Ambulatory.    Sensory Functions:   Intact to touch and pinprick to face and extremities.    Reflexes:  Deep tendon reflexes normoactive to biceps, knees and ankles.  Babinski absent (present).  Cerebellar Testing:    Finger to nose intact.  Nystagmus absent.  Neurovascular: Carotid auscultation full without bruits.      LABS:                        14.2   10.4  )-----------( 144      ( 2017 09:34 )             44.4         142  |  107  |  21  ----------------------------<  141<H>  3.3<L>   |  25  |  1.13    Ca    9.0      2017 09:34    TPro  7.0  /  Alb  3.2<L>  /  TBili  0.6  /  DBili  x   /  AST  24  /  ALT  19  /  AlkPhos  108          Urinalysis Basic - ( 2017 10:51 )    Color: Yellow / Appearance: x / S.015 / pH: x  Gluc: x / Ketone: Negative  / Bili: Negative / Urobili: Negative mg/dL   Blood: x / Protein: 15 mg/dL / Nitrite: Negative   Leuk Esterase: Negative / RBC: x / WBC x   Sq Epi: x / Non Sq Epi: Few / Bacteria: x        RADIOLOGY & ADDITIONAL STUDIES:    National Institutes of Health Stroke Scale Score:     Time/Priority:  STAT    Additional Instructions:  Type score here ___ ( @ 09:12)  Comprehensive Metabolic Panel: STAT ( @ 09:12)  Complete Blood Count + Automated Diff: STAT ( @ 09:12)  Troponin I, Serum: STAT ( @ 09:12)  Creatine Kinase, Serum: STAT ( @ 09:12)  CKMB Mass Assay: STAT ( @ 09:12)  Troponin I, Serum: STAT ( @ 09:12)  Urinalysis: STAT ( @ 09:12)  CT Head No Cont: Urgent   Indication: stroke symptoms this AM, last normal last night  Transport: Stretcher-Crib  Exam Completed  Provider's Contact #: [phone] ( @ 09:12)  Vital Signs:     Frequency:  every 1 hour ( @ 09:12)  Pulse Oximetry:   Frequency: <Continuous>    Additional Instructions:  Notify Provider if oxygen saturation is LESS THAN 92% ( @ 09:12)  Cardiac Monitor Bedside:     Time/Priority:  STAT ( 09:12)  12 Lead ECG:   Provider's Contact #: [phone] ( 09:12)  Dysphagia Screening:     Time/Priority:  STAT ( 09:12)  Assess Neurological Status:     Type of Neuro Check:  Stroke    Frequency:  every 1 hour    Additional Instructions:  Perform stroke neurological check ( 09:12)  Provider to RN:       Assess Level of Consciousness, using Ronald Coma Scale ( 09:12)  Education:     Other: Stroke education    Additional Instructions: Distribute Stroke education material and provide stroke education ( @ 09:12)  Blood Glucose Point Of Care Testing:     Frequency:  once ( @ 09:12)  Activity - Bedrest ( @ 09:12)  Fall Risk Protocol:     Time/Priority:  STAT    Additional Instructions:  Follow fall risk protocol ( @ 09:12)  Aspiration Precautions:     Time/Priority:  Routine ( @ 09:12)  Seizure Precautions:     Time/Priority:  Routine ( @ 09:12)  Diet, NPO ( @ 09:12)  Indwelling Urethral Catheter:     Connect To:  Straight Drainage/Gravity    Indication:  Improved Comfort Care ( @ 10:01)  aspirin Suppository:   300 milliGRAM(s), Rectal, Once, Stop After 1 Doses  Indication: CVA  Administration Instructions: for rectal use  Provider's Contact #: [phone] ( @ 10:42)  Admit to Inpatient Level of Care.:     Service:  TELEMETRY    Physician:  Chidi Deleon    Additional Instructions:  Diagnosis: Cerebral infarction due to thrombosis of left middle cerebral artery  Isolation: None  Special Consideration: None ( @ 10:45)  Urine Microscopic-Add On (NC): 10:47 ( @ 10:56)  Admit from ED ( @ 11:08)  (Floorstock):   Qty Removed: 1 each ( @ 11:09)    Assessment & Opinion:   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  Vascular Dementia.  Recommendations:  Brain MRI.  Carotid doppler.  Echocardiogram.  EEG.   DVT prophylaxis as ordered.   Stat  supp (given).  Medications: Historian:     Patient unable.  ER notes reviewed.  Brain CT:  1)  acute left MCA territory infarct with intraluminal thrombus in the proximal left M2 region of the middle cerebral artery.  2)  extensive chronic ischemic changes and multiple old infarcts again noted as well.    HPI:    FREDA RIVERA.   · Chief Complaint: The patient is a 77y Male history of right sided weakness.  HPI Objective Statement: 77 year old male with PMH of DM II, HLD, HTN, CAD s/p 1 stent, CVA 1 month ago, dementia family unsure of laterality, but states normally pt awake and alert, lives alone, presenting to ED due to worsened mental status, facial droop on left and weakness of R arm/leg. Family states last seen normal last night. Brought in by EMS with FS noted outpt 120s.    Case d/w :  Advised to check for [facility] opinion regarding intraluminal thrombus in the proximal left M2 region of the middle cerebral artery.  Dr. Joe spoke to [facility] Intensivist and advised continued care here in [place].  See Dr. Joe's notes.     PAST MEDICAL & SURGICAL HISTORY:  Dementia; CAD (Coronary Artery Disease): s/p 4 stents (1 in &#x27;02, 1 in &#x27;04, 2 in &#x27;11);   PUD (Peptic Ulcer Disease); CVA (Cerebral Infarction); DM (Diabetes Mellitus); HTN - Hypertension; Coronary Stent; History of Cholecystectomy    MEDICATIONS  (STANDING):    MEDICATIONS  (PRN):    Alergies: No Known Allergies  Intolerances  FAMILY HISTORY:    Vital Signs Last 24 Hrs  T(C): 36.9 (2017 10:48), Max: 36.9 (2017 10:48)  T(F): 98.4 (2017 10:48), Max: 98.4 (2017 10:48)  HR: 58 (2017 10:48) (56 - 58)  BP: 158/86 (2017 10:48) (143/78 - 158/86)  BP(mean): --  RR: 18 (2017 10:48) (18 - 20)  SpO2: 96% (2017 10:48) (96% - 96%)    NEUROLOGICAL EXAM:  HENT:  Normocephalic head; atraumatic head.  Neck supple.  ENT: normal looking.  Mental State:      Awake. Awake but mute.     Cranial Nerves:   Pupils round and reactive to light.   Right Visual fields cut.  Right  Facial symmetry   Motor Functions:  Dense right hemiplegia.      Sensory Functions:   Poor responses.      Reflexes:  Deep tendon reflexes normoactive to knees and ankles.  Babinski present on right.   Cerebellar Testing:    Unable. Neurovascular: Carotid auscultation full without bruits.      LABS:                        14.2   10.4  )-----------( 144      ( 2017 09:34 )             44.4         142  |  107  |  21  ----------------------------<  141<H>  3.3<L>   |  25  |  1.13    Ca    9.0      2017 09:34    TPro  7.0  /  Alb  3.2<L>  /  TBili  0.6  /  DBili  x   /  AST  24  /  ALT  19  /  AlkPhos  108          Urinalysis Basic - ( 2017 10:51 )    Color: Yellow / Appearance: x / S.015 / pH: x  Gluc: x / Ketone: Negative  / Bili: Negative / Urobili: Negative mg/dL   Blood: x / Protein: 15 mg/dL / Nitrite: Negative   Leuk Esterase: Negative / RBC: x / WBC x   Sq Epi: x / Non Sq Epi: Few / Bacteria: x    RADIOLOGY & ADDITIONAL STUDIES:    National Institutes of Health Stroke Scale Score:     Time/Priority:  STAT    Additional Instructions:  Type score here ___ ( @ 09:12)  Comprehensive Metabolic Panel: STAT ( @ 09:12)  Complete Blood Count + Automated Diff: STAT ( @ 09:12)  Troponin I, Serum: STAT ( @ 09:12)  Creatine Kinase, Serum: STAT ( @ 09:12)  CKMB Mass Assay: STAT ( 09:12)  Troponin I, Serum: STAT ( @ 09:12)  Urinalysis: STAT ( @ 09:12)  CT Head No Cont: Urgent   Indication: stroke symptoms this AM, last normal last night  Transport: Stretcher-Crib  Exam Completed  Provider's Contact #: [phone] ( @ 09:12)  Vital Signs:     Frequency:  every 1 hour ( @ 09:12)  Pulse Oximetry:   Frequency: <Continuous>    Additional Instructions:  Notify Provider if oxygen saturation is LESS THAN 92% ( 09:12)  Cardiac Monitor Bedside:     Time/Priority:  STAT ( @ 09:12)  12 Lead ECG:   Provider's Contact #: [phone] ( @ 09:12)  Dysphagia Screening:     Time/Priority:  STAT ( 09:12)  Assess Neurological Status:     Type of Neuro Check:  Stroke    Frequency:  every 1 hour    Additional Instructions:  Perform stroke neurological check (:12)  Provider to RN:       Assess Level of Consciousness, using Ronald Coma Scale (:12)  Education:     Other: Stroke education    Additional Instructions: Distribute Stroke education material and provide stroke education ( 09:12)  Blood Glucose Point Of Care Testing:     Frequency:  once ( @ 09:12)  Activity - Bedrest ( 09:12)  Fall Risk Protocol:     Time/Priority:  STAT    Additional Instructions:  Follow fall risk protocol ( 09:12)  Aspiration Precautions:     Time/Priority:  Routine ( @ 09:12)  Seizure Precautions:     Time/Priority:  Routine ( 09:12)  Diet, NPO ( 09:12)  Indwelling Urethral Catheter:     Connect To:  Straight Drainage/Gravity    Indication:  Improved Comfort Care ( @ 10:01)  aspirin Suppository:   300 milliGRAM(s), Rectal, Once, Stop After 1 Doses  Indication: CVA  Administration Instructions: for rectal use  Provider's Contact #: [phone] ( @ 10:42)  Admit to Inpatient Level of Care.:     Service:  TELEMETRY    Physician:  Chidi Deleon    Additional Instructions:  Diagnosis: Cerebral infarction due to thrombosis of left middle cerebral artery  Isolation: None  Special Consideration: None ( @ 10:45)  Urine Microscopic-Add On (NC): 10:47 ( @ 10:56)  Admit from ED ( @ 11:08)  (Floorstock):   Qty Removed: 1 each ( @ 11:09)    Assessment & Opinion:   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  Vascular Dementia.  Recommendations:  Brain MRI.  Carotid doppler.  Echocardiogram.  EEG.   DVT prophylaxis as ordered.   Stat  supp (given).

## 2017-07-27 NOTE — PHYSICAL THERAPY INITIAL EVALUATION ADULT - ADDITIONAL COMMENTS
As per family who was present during the evaluation process stated that patient is able to walk around the house independently or with distant supervision using straight cane. Able to communicate verbally.

## 2017-07-27 NOTE — PHYSICAL THERAPY INITIAL EVALUATION ADULT - TRANSFER SAFETY CONCERNS NOTED: SIT/STAND, REHAB EVAL
decreased step length/decreased balance during turns/decreased sequencing ability/decreased safety awareness/losing balance/decreased proprioception

## 2017-07-27 NOTE — PHYSICAL THERAPY INITIAL EVALUATION ADULT - PATIENT/FAMILY/SIGNIFICANT OTHER GOALS STATEMENT, PT EVAL
Pt.s family would like to see the patient be able to walk with cane independently or with assistance even for short distances around the hous.

## 2017-07-27 NOTE — H&P ADULT - HISTORY OF PRESENT ILLNESS
77 year old male with PMH of DM II, HLD, HTN, CAD s/p 1 stent, CVA 1 month ago, dementia family unsure of laterality, but states normally pt awake and alert, lives alone, presenting to ED due to worsened mental status, facial droop on left and weakness of R arm/leg. Family states last seen normal last night. Brought in by EMS with FS noted outpt 120s.

## 2017-07-27 NOTE — PHYSICAL THERAPY INITIAL EVALUATION ADULT - MANUAL MUSCLE TESTING RESULTS, REHAB EVAL
LUE and LLE approx 4/5;  RUE and RLE with residual weakness from provious CVA (RUE approx 0/5; RLE approx 2-/5)/grossly assessed due to

## 2017-07-27 NOTE — ED PROVIDER NOTE - CONSTITUTIONAL, MLM
normal... Well appearing, well nourished, awake, alert, not answering any questions, not able to focus on examiner.

## 2017-07-27 NOTE — PHYSICAL THERAPY INITIAL EVALUATION ADULT - MODIFIED CLINICAL TEST OF SENSORY INTEGRATION IN BALANCE TEST
Barthels Score: feeding 0; Bathing 0; Grooming 0; Dressing 0; Bowels 5; Bladder 0; Toilet use 0; Transfers 0; Mobility  0; Stairs 0=  5/100

## 2017-07-27 NOTE — H&P ADULT - NSHPPHYSICALEXAM_GEN_ALL_CORE
GENERAL: NAD, well-groomed, well-developed  HEAD:  Atraumatic, Normocephalic  EYES: EOMI, PERRLA, conjunctiva and sclera clear  ENMT: No tonsillar erythema, exudates, or enlargement; Moist mucous membranes, Good dentition, No lesions  NECK: Supple, No JVD, Normal thyroid  NERVOUS SYSTEM:  Alert & Oriented X3, Left facial drooping. Right arm 3/5 and right leg 3/5  CHEST/LUNG: Clear to percussion bilaterally; No rales, rhonchi, wheezing, or rubs  HEART: Regular rate and rhythm; No murmurs, rubs, or gallops  ABDOMEN: Soft, Nontender, Nondistended; Bowel sounds present  EXTREMITIES:  2+ Peripheral Pulses, No clubbing, cyanosis, or edema  LYMPH: No lymphadenopathy notedRECTAL: No masses, prostate normal size and smooth, Guiac negative   BREAST: No palpatble masses, skin no lesions no retractions, no discharages. adenexa no palpable masses noted   GYN: uterus normal size, adenexa no palpable masses, no CMT, no uterine discharge   SKIN: No rashes or lesions

## 2017-07-27 NOTE — PHYSICAL THERAPY INITIAL EVALUATION ADULT - TRANSFER SAFETY CONCERNS NOTED: BED/CHAIR, REHAB EVAL
decreased sequencing ability/decreased weight-shifting ability/decreased balance during turns/stepping too close to front of assistive device/decreased safety awareness/decreased step length/losing balance/decreased proprioception

## 2017-07-27 NOTE — PHYSICAL THERAPY INITIAL EVALUATION ADULT - FOLLOWS COMMANDS/ANSWERS QUESTIONS, REHAB EVAL
unable to follow multi-step instructions/unable to follow commands/unable to answer questions/non verbal, non communicative

## 2017-07-27 NOTE — ED ADULT NURSE REASSESSMENT NOTE - NS ED NURSE REASSESS COMMENT FT1
Fernandez inserted as ordered, pt tolerated well, no trauma noted 50 ml of clear yellow urine. continued to monitor.

## 2017-07-27 NOTE — CONSULT NOTE ADULT - SUBJECTIVE AND OBJECTIVE BOX
HPI:  HPI:  77 year old male with PMH of DM II, HLD, HTN, CAD s/p 1 stent, CVA 1 month ago, dementia family unsure of laterality, but states normally pt awake and alert, lives alone, presenting to ED due to worsened mental status, facial droop on left and weakness of R arm/leg. Family states last seen normal last night. Brought in by EMS with FS noted outpt 120s. (2017 13:35)      Chief Complaint:  Patient is a 77y old  Male who presents with a chief complaint of Left facial drooping (2017 13:35)      Review of Systems:    see above         Social History/Family History  SOCHX:   tobacco,  -  alcohol    FMHX: FA/MO  - contributory       Discussed with:  PMD, Family    Physical Exam:    Vital Signs:  Vital Signs Last 24 Hrs  T(C): 36.9 (2017 19:45), Max: 36.9 (2017 10:48)  T(F): 98.4 (2017 19:45), Max: 98.4 (2017 10:48)  HR: 72 (2017 19:45) (56 - 72)  BP: 128/70 (2017 19:45) (128/70 - 158/86)  BP(mean): --  RR: 17 (2017 19:45) (16 - 20)  SpO2: 98% (2017 19:45) (95% - 98%)  Daily Height in cm: 170.18 (2017 09:02)    Daily   I&O's Summary        Chest:  Full & symmetric excursion, no increased effort, breath sounds clear  Cardiovascular:  Regular rhythm, S1, S2, no murmur/rub/S3/S4, no carotid/femoral/abdominal bruit, radial/pedal pulses 2+, no edema  Abdomen:  Soft, non-tender, non-distended, normoactive bowel sounds, no HSM      Laboratory:                          14.2   10.4  )-----------( 144      ( 2017 09:34 )             44.4         142  |  107  |  21  ----------------------------<  141<H>  3.3<L>   |  25  |  1.13    Ca    9.0      2017 09:34    TPro  7.0  /  Alb  3.2<L>  /  TBili  0.6  /  DBili  x   /  AST  24  /  ALT  19  /  AlkPhos  108  07-27      CARDIAC MARKERS ( 2017 09:34 )  .153 ng/mL / x     / 35 U/L / x     / 1.3 ng/mL      CAPILLARY BLOOD GLUCOSE  142 (2017 19:08)  129 (2017 09:02)        LIVER FUNCTIONS - ( 2017 09:34 )  Alb: 3.2 g/dL / Pro: 7.0 gm/dL / ALK PHOS: 108 U/L / ALT: 19 U/L / AST: 24 U/L / GGT: x             Urinalysis Basic - ( 2017 10:51 )    Color: Yellow / Appearance: x / S.015 / pH: x  Gluc: x / Ketone: Negative  / Bili: Negative / Urobili: Negative mg/dL   Blood: x / Protein: 15 mg/dL / Nitrite: Negative   Leuk Esterase: Negative / RBC: x / WBC x   Sq Epi: x / Non Sq Epi: Few / Bacteria: x          Assessment:  New CVA  Chronic systolic CHF  Mild troponin leak in a setting of CVA, not likely AMI  Continue current CV management as tolerated.

## 2017-07-27 NOTE — H&P ADULT - ASSESSMENT
77 years old male is admitted for acute right MCA infarct, DM II, chronic left systolic CHF, CAD with stent  Plan: Admit to tele. Neurology and cardiology evaluation

## 2017-07-28 ENCOUNTER — TRANSCRIPTION ENCOUNTER (OUTPATIENT)
Age: 77
End: 2017-07-28

## 2017-07-28 LAB
ANION GAP SERPL CALC-SCNC: 10 MMOL/L — SIGNIFICANT CHANGE UP (ref 5–17)
BUN SERPL-MCNC: 19 MG/DL — SIGNIFICANT CHANGE UP (ref 7–23)
CALCIUM SERPL-MCNC: 8.5 MG/DL — SIGNIFICANT CHANGE UP (ref 8.5–10.1)
CHLORIDE SERPL-SCNC: 110 MMOL/L — HIGH (ref 96–108)
CHOLEST SERPL-MCNC: 99 MG/DL — SIGNIFICANT CHANGE UP (ref 10–199)
CO2 SERPL-SCNC: 24 MMOL/L — SIGNIFICANT CHANGE UP (ref 22–31)
CREAT SERPL-MCNC: 0.83 MG/DL — SIGNIFICANT CHANGE UP (ref 0.5–1.3)
GLUCOSE SERPL-MCNC: 125 MG/DL — HIGH (ref 70–99)
HCT VFR BLD CALC: 42.1 % — SIGNIFICANT CHANGE UP (ref 39–50)
HDLC SERPL-MCNC: 34 MG/DL — LOW (ref 40–125)
HGB BLD-MCNC: 13.5 G/DL — SIGNIFICANT CHANGE UP (ref 13–17)
LIPID PNL WITH DIRECT LDL SERPL: 48 MG/DL — SIGNIFICANT CHANGE UP
MCHC RBC-ENTMCNC: 26.6 PG — LOW (ref 27–34)
MCHC RBC-ENTMCNC: 32 GM/DL — SIGNIFICANT CHANGE UP (ref 32–36)
MCV RBC AUTO: 83 FL — SIGNIFICANT CHANGE UP (ref 80–100)
PLATELET # BLD AUTO: 124 K/UL — LOW (ref 150–400)
POTASSIUM SERPL-MCNC: 3.6 MMOL/L — SIGNIFICANT CHANGE UP (ref 3.5–5.3)
POTASSIUM SERPL-SCNC: 3.6 MMOL/L — SIGNIFICANT CHANGE UP (ref 3.5–5.3)
RBC # BLD: 5.07 M/UL — SIGNIFICANT CHANGE UP (ref 4.2–5.8)
RBC # FLD: 14.5 % — SIGNIFICANT CHANGE UP (ref 11–15)
SODIUM SERPL-SCNC: 144 MMOL/L — SIGNIFICANT CHANGE UP (ref 135–145)
TOTAL CHOLESTEROL/HDL RATIO MEASUREMENT: 2.9 RATIO — LOW (ref 3.4–9.6)
TRIGL SERPL-MCNC: 86 MG/DL — SIGNIFICANT CHANGE UP (ref 10–149)
TSH SERPL-MCNC: 0.56 UIU/ML — SIGNIFICANT CHANGE UP (ref 0.36–3.74)
WBC # BLD: 11.9 K/UL — HIGH (ref 3.8–10.5)
WBC # FLD AUTO: 11.9 K/UL — HIGH (ref 3.8–10.5)

## 2017-07-28 PROCEDURE — 93880 EXTRACRANIAL BILAT STUDY: CPT | Mod: 26

## 2017-07-28 RX ORDER — ENOXAPARIN SODIUM 100 MG/ML
40 INJECTION SUBCUTANEOUS DAILY
Qty: 0 | Refills: 0 | Status: DISCONTINUED | OUTPATIENT
Start: 2017-07-28 | End: 2017-08-01

## 2017-07-28 RX ORDER — ASPIRIN/CALCIUM CARB/MAGNESIUM 324 MG
81 TABLET ORAL DAILY
Qty: 0 | Refills: 0 | Status: DISCONTINUED | OUTPATIENT
Start: 2017-07-28 | End: 2017-07-30

## 2017-07-28 RX ORDER — SODIUM CHLORIDE 9 MG/ML
1000 INJECTION, SOLUTION INTRAVENOUS
Qty: 0 | Refills: 0 | Status: DISCONTINUED | OUTPATIENT
Start: 2017-07-28 | End: 2017-07-29

## 2017-07-28 RX ADMIN — AMIODARONE HYDROCHLORIDE 200 MILLIGRAM(S): 400 TABLET ORAL at 17:09

## 2017-07-28 RX ADMIN — GABAPENTIN 300 MILLIGRAM(S): 400 CAPSULE ORAL at 12:28

## 2017-07-28 RX ADMIN — Medication 40 MILLIGRAM(S): at 05:45

## 2017-07-28 RX ADMIN — PANTOPRAZOLE SODIUM 40 MILLIGRAM(S): 20 TABLET, DELAYED RELEASE ORAL at 05:46

## 2017-07-28 RX ADMIN — Medication 50 MILLIGRAM(S): at 17:09

## 2017-07-28 RX ADMIN — Medication 4: at 16:46

## 2017-07-28 RX ADMIN — MEMANTINE HYDROCHLORIDE 10 MILLIGRAM(S): 10 TABLET ORAL at 05:45

## 2017-07-28 RX ADMIN — SODIUM CHLORIDE 75 MILLILITER(S): 9 INJECTION, SOLUTION INTRAVENOUS at 12:29

## 2017-07-28 RX ADMIN — Medication 81 MILLIGRAM(S): at 12:28

## 2017-07-28 RX ADMIN — AMIODARONE HYDROCHLORIDE 200 MILLIGRAM(S): 400 TABLET ORAL at 05:45

## 2017-07-28 RX ADMIN — TAMSULOSIN HYDROCHLORIDE 0.4 MILLIGRAM(S): 0.4 CAPSULE ORAL at 21:30

## 2017-07-28 RX ADMIN — GABAPENTIN 300 MILLIGRAM(S): 400 CAPSULE ORAL at 21:30

## 2017-07-28 RX ADMIN — ENOXAPARIN SODIUM 40 MILLIGRAM(S): 100 INJECTION SUBCUTANEOUS at 12:27

## 2017-07-28 RX ADMIN — ATORVASTATIN CALCIUM 40 MILLIGRAM(S): 80 TABLET, FILM COATED ORAL at 21:30

## 2017-07-28 RX ADMIN — Medication 2: at 11:27

## 2017-07-28 RX ADMIN — LOSARTAN POTASSIUM 100 MILLIGRAM(S): 100 TABLET, FILM COATED ORAL at 05:44

## 2017-07-28 RX ADMIN — MEMANTINE HYDROCHLORIDE 10 MILLIGRAM(S): 10 TABLET ORAL at 17:09

## 2017-07-28 RX ADMIN — MONTELUKAST 10 MILLIGRAM(S): 4 TABLET, CHEWABLE ORAL at 12:28

## 2017-07-28 RX ADMIN — GABAPENTIN 300 MILLIGRAM(S): 400 CAPSULE ORAL at 05:45

## 2017-07-28 NOTE — DISCHARGE NOTE ADULT - CARE PROVIDER_API CALL
Chidi Deleon), Internal Medicine  [address] [place] [address]  Phone: [phone]  Fax: [phone]    Nilay rTent), Neurology  [address] [place] [address]  Phone: [phone]  Fax: [phone]

## 2017-07-28 NOTE — PROGRESS NOTE ADULT - SUBJECTIVE AND OBJECTIVE BOX
INTERVAL HPI/OVERNIGHT EVENTS:   Remains mute and with dense right hemiplegia.  Patient is presently on one to one watch (pulling his iv lines).    RECENT RADIOLOGY & ADDITIONAL TESTS:  Carotid doppler negative.    NEUROLOGICAL EXAM (Pertinent):  Vital Signs Last 24 Hrs  T(C): 37.4 (2017 11:06), Max: 37.7 (2017 05:24)  T(F): 99.4 (2017 11:06), Max: 99.8 (2017 05:24)  HR: 70 (2017 11:06) (55 - 72)  BP: 118/63 (2017 11:06) (118/63 - 148/76)  BP(mean): --  RR: 17 (2017 11:06) (16 - 18)  SpO2: 96% (2017 11:06) (95% - 98%)    MEDICATIONS  (STANDING):  losartan 100 milliGRAM(s) Oral daily  tamsulosin 0.4 milliGRAM(s) Oral at bedtime  amiodarone    Tablet 200 milliGRAM(s) Oral every 12 hours  gabapentin 300 milliGRAM(s) Oral three times a day  atorvastatin 40 milliGRAM(s) Oral at bedtime  metoprolol 50 milliGRAM(s) Oral two times a day  furosemide    Tablet 40 milliGRAM(s) Oral daily  pantoprazole    Tablet 40 milliGRAM(s) Oral before breakfast  memantine 10 milliGRAM(s) Oral two times a day  montelukast 10 milliGRAM(s) Oral daily  insulin lispro (HumaLOG) corrective regimen sliding scale   SubCutaneous three times a day before meals  dextrose 5%. 1000 milliLiter(s) (50 mL/Hr) IV Continuous <Continuous>  dextrose 50% Injectable 12.5 Gram(s) IV Push once  dextrose 50% Injectable 25 Gram(s) IV Push once  dextrose 50% Injectable 25 Gram(s) IV Push once  aspirin enteric coated 81 milliGRAM(s) Oral daily  sodium chloride 0.45%. 1000 milliLiter(s) (75 mL/Hr) IV Continuous <Continuous>  enoxaparin Injectable 40 milliGRAM(s) SubCutaneous daily    MEDICATIONS  (PRN):  dextrose Gel 1 Dose(s) Oral once PRN Blood Glucose LESS THAN 70 milliGRAM(s)/deciliter  glucagon  Injectable 1 milliGRAM(s) IntraMuscular once PRN Glucose LESS THAN 70 milligrams/deciliter    LABS:                        13.5   11.9  )-----------( 124      ( 2017 06:17 )             42.1         144  |  110<H>  |  19  ----------------------------<  125<H>  3.6   |  24  |  0.83    Ca    8.5      2017 06:17    TPro  7.0  /  Alb  3.2<L>  /  TBili  0.6  /  DBili  x   /  AST  24  /  ALT  19  /  AlkPhos  108        Urinalysis Basic - ( 2017 10:51 )    Color: Yellow / Appearance: x / S.015 / pH: x  Gluc: x / Ketone: Negative  / Bili: Negative / Urobili: Negative mg/dL   Blood: x / Protein: 15 mg/dL / Nitrite: Negative   Leuk Esterase: Negative / RBC: x / WBC x   Sq Epi: x / Non Sq Epi: Few / Bacteria: x    Assessment & Opinion:   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  Vascular Dementia.  Recommendations:  Brain MRI.   Echocardiogram.  EEG.   DVT prophylaxis as ordered.   Continue current medications.

## 2017-07-28 NOTE — PROGRESS NOTE ADULT - SUBJECTIVE AND OBJECTIVE BOX
Assessment:  New CVA  Chronic systolic CHF  Mild troponin leak in a setting of CVA, not likely AMI  Continue current CV management as tolerated.

## 2017-07-28 NOTE — DISCHARGE NOTE ADULT - HOSPITAL COURSE
Patient is a 77y old  Male who presents with a chief complaint of Left facial drooping Patient is a 77y old  Male who presents with a chief complaint of Left facial drooping. Admitted with 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  Vascular Dementia.  Failure to Thrive.  Peg was placed, pt poor prognosis family requested long term nursing home placement.   d/c paper done as per PMD request.

## 2017-07-28 NOTE — SWALLOW BEDSIDE ASSESSMENT ADULT - SLP GENERAL OBSERVATIONS
braintree 1 pt seen bedside, fairly alert, nonverbal and essentially noncommunicative. he did not follow one step directions or models and suspect left visual neglect

## 2017-07-28 NOTE — PROGRESS NOTE ADULT - SUBJECTIVE AND OBJECTIVE BOX
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 Newly developed gross hematoria    MEDICATIONS  (STANDING):  losartan 100 milliGRAM(s) Oral daily  tamsulosin 0.4 milliGRAM(s) Oral at bedtime  amiodarone    Tablet 200 milliGRAM(s) Oral every 12 hours  gabapentin 300 milliGRAM(s) Oral three times a day  atorvastatin 40 milliGRAM(s) Oral at bedtime  metoprolol 50 milliGRAM(s) Oral two times a day  furosemide    Tablet 40 milliGRAM(s) Oral daily  pantoprazole    Tablet 40 milliGRAM(s) Oral before breakfast  memantine 10 milliGRAM(s) Oral two times a day  montelukast 10 milliGRAM(s) Oral daily  insulin lispro (HumaLOG) corrective regimen sliding scale   SubCutaneous three times a day before meals  dextrose 5%. 1000 milliLiter(s) (50 mL/Hr) IV Continuous <Continuous>  dextrose 50% Injectable 12.5 Gram(s) IV Push once  dextrose 50% Injectable 25 Gram(s) IV Push once  dextrose 50% Injectable 25 Gram(s) IV Push once  sodium chloride 0.9%. 1000 milliLiter(s) (60 mL/Hr) IV Continuous <Continuous>    MEDICATIONS  (PRN):  dextrose Gel 1 Dose(s) Oral once PRN Blood Glucose LESS THAN 70 milliGRAM(s)/deciliter  glucagon  Injectable 1 milliGRAM(s) IntraMuscular once PRN Glucose LESS THAN 70 milligrams/deciliter    Allergies    No Known Allergies    Intolerances      Vital Signs Last 24 Hrs  T(C): 37.7 (2017 05:24), Max: 37.7 (2017 05:24)  T(F): 99.8 (2017 05:24), Max: 99.8 (2017 05:24)  HR: 55 (2017 06:42) (55 - 72)  BP: 134/84 (2017 05:24) (128/70 - 158/86)  BP(mean): --  RR: 17 (2017 05:24) (16 - 18)  SpO2: 97% (2017 05:24) (95% - 98%)    PHYSICAL EXAM:  GENERAL: NAD, well-groomed, well-developed  HEAD:  Atraumatic, Normocephalic  EYES: EOMI, PERRLA, conjunctiva and sclera clear  ENMT: No tonsillar erythema, exudates, or enlargement; Moist mucous membranes, Good dentition, No lesions  NECK: Supple, No JVD, Normal thyroid  NERVOUS SYSTEM:  Alert & Oriented X3, Good concentration; Motor Strength 5/5 B/L upper and lower extremities; DTRs 2+ intact and symmetric  CHEST/LUNG: Clear to percussion bilaterally; No rales, rhonchi, wheezing, or rubs  HEART: Regular rate and rhythm; No murmurs, rubs, or gallops  ABDOMEN: Soft, Nontender, Nondistended; Bowel sounds present  EXTREMITIES:  2+ Peripheral Pulses, No clubbing, cyanosis, or edema  LYMPH: No lymphadenopathy noted  SKIN: No rashes or lesions     @ :  -   @ 07:00  --------------------------------------------------------  IN: 240 mL / OUT: 400 mL / NET: -160 mL     @ 07:  -   @ 10:03  --------------------------------------------------------  IN: 240 mL / OUT: 0 mL / NET: 240 mL        LABS:                        13.5   11.9  )-----------( 124      ( 2017 06:17 )             42.1         144  |  110<H>  |  19  ----------------------------<  125<H>  3.6   |  24  |  0.83    Ca    8.5      2017 06:17    TPro  7.0  /  Alb  3.2<L>  /  TBili  0.6  /  DBili  x   /  AST  24  /  ALT  19  /  AlkPhos  108        Urinalysis Basic - ( 2017 10:51 )    Color: Yellow / Appearance: x / S.015 / pH: x  Gluc: x / Ketone: Negative  / Bili: Negative / Urobili: Negative mg/dL   Blood: x / Protein: 15 mg/dL / Nitrite: Negative   Leuk Esterase: Negative / RBC: x / WBC x   Sq Epi: x / Non Sq Epi: Few / Bacteria: x      CAPILLARY BLOOD GLUCOSE  139 (2017 08:05)  161 (2017 22:21)  142 (2017 19:08)        Thyroid Stimulating Hormone, Serum: 0.555 uIU/mL ( @ 06:17)  Troponin I, Serum: .153 ng/mL ( @ 09:34)  CPK Mass Assay %: 3.7 % ( @ 09:34)  Troponin I, Serum: .059 ng/mL ( @ 07:32)  Troponin I, Serum: .049 ng/mL ( @ 03:09)  Troponin I, Serum: .056 ng/mL ( @ 20:16)  Troponin I, Serum: .046 ng/mL ( @ 11:48)  Troponin I, Serum: .054 ng/mL ( @ 08:29)    Cultures:            RADIOLOGY & ADDITIONAL TESTS:    Imaging Personally Reviewed:  [X] YES  [ ] NO    Consultant(s) Notes Reviewed:  [X] YES  [ ] NO    Care Discussed with Consultants/Other Providers [X] YES  [ ] NO

## 2017-07-28 NOTE — SWALLOW BEDSIDE ASSESSMENT ADULT - SWALLOW EVAL: DIAGNOSIS
pt presented with oropharyngeal dysphagia marked by reduced cognition and fair alertness- negatively impacting swallow function, inconsistent decreased oral opening for po trial, anterior loss across textures, slow oral transport with suspected delay in swallow trigger and reduced laryngeal elevation. noted inconsistent cough with thin liquid

## 2017-07-28 NOTE — SWALLOW BEDSIDE ASSESSMENT ADULT - SWALLOW EVAL: RECOMMENDED FEEDING/EATING TECHNIQUES
crush medication (when feasible)/position upright (90 degrees)/allow for swallow between intakes/small sips/bites

## 2017-07-28 NOTE — DISCHARGE NOTE ADULT - PATIENT PORTAL LINK FT
“You can access the Follow[facility] Patient Portal, offered by [facility], by registering with the following website: [email or web address]”

## 2017-07-28 NOTE — DISCHARGE NOTE ADULT - MEDICATION SUMMARY - MEDICATIONS TO TAKE
I will START or STAY ON the medications listed below when I get home from the hospital:    aspirin 81 mg oral tablet, chewable  -- 1 tab(s) by mouth once a day  -- Indication: For CEREBRAL INFARCTION DUE TO THROMBOSIS OF LEFT MIDDLE CEREBRA    losartan 100 mg oral tablet  -- 1 tab(s) by mouth once a day  -- Indication: For essential HTN    tamsulosin 0.4 mg oral capsule  -- 1 cap(s) by mouth once a day  -- Indication: For BPH    amiodarone 200 mg oral tablet  -- 1 tab(s) by mouth every 12 hours  -- Indication: For essential HTN    gabapentin 100 mg oral capsule  -- 3 cap(s) by mouth 3 times a day  -- Indication: For neuropathy    insulin lispro 100 units/mL subcutaneous solution  -- 4 unit(s) subcutaneous once a day (before a meal)  -- Indication: For DM type II    insulin lispro 100 units/mL subcutaneous solution  -- 4 unit(s) subcutaneous once a day (before a meal)  -- Indication: For DM type II    insulin lispro 100 units/mL subcutaneous solution  -- 4 unit(s) subcutaneous once a day (before a meal)  -- Indication: For DM type II    insulin lispro 100 units/mL subcutaneous solution  --  subcutaneous 3 times a day (before meals); 2 Unit(s) if Glucose 151 - 200  4 Unit(s) if Glucose 201 - 250  6 Unit(s) if Glucose 251 - 300  8 Unit(s) if Glucose 301 - 350  10 Unit(s) if Glucose 351 - 400  12 Unit(s) if Glucose Greater Than 400  -- Indication: For DM type II    rosuvastatin 10 mg oral tablet  -- 1 tab(s) by mouth once a day (at bedtime)  -- Indication: For HLD    metoprolol tartrate 50 mg oral tablet  -- 1 tab(s) by mouth 2 times a day  -- Indication: For Essential HTN    furosemide 40 mg oral tablet  -- 1 tab(s) by mouth once a day  -- Indication: For Essential HTN    montelukast 10 mg oral tablet  -- 1 tab(s) by mouth once a day  -- Indication: For Seasonal Allergies    memantine 10 mg oral tablet  -- 1 tab(s) by mouth 2 times a day  -- Indication: For Dementia    esomeprazole 40 mg oral delayed release capsule  -- 1 cap(s) by mouth once a day  -- Indication: For GERD

## 2017-07-28 NOTE — SWALLOW BEDSIDE ASSESSMENT ADULT - SLP PERTINENT HISTORY OF CURRENT PROBLEM
77 year old male with PMH of DM II, HLD, HTN, CAD s/p 1 stent, CVA 1 month ago, dementia family unsure of laterality, but states normally pt awake and alert, lives alone, presenting to ED due to worsened mental status, facial droop on left and weakness of R arm/leg. Family states last seen normal last night. Brought in by EMS with FS noted outpt 120s

## 2017-07-28 NOTE — DISCHARGE NOTE ADULT - CARE PLAN
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  Secondary Diagnosis:	Type 2 diabetes mellitus without complication, unspecified long term insulin use status  Goal:	stable  Instructions for follow-up, activity and diet:	finger sticks q 6 hours with pre meal insulin and sliding scale and lantus at night  Secondary Diagnosis:	CAD (coronary artery disease)  Goal:	stable  Instructions for follow-up, activity and diet:	continue home medication  Secondary Diagnosis:	Essential hypertension  Goal:	stable  Instructions for follow-up, activity and diet:	continue home medication

## 2017-07-28 NOTE — SWALLOW BEDSIDE ASSESSMENT ADULT - COMMENTS
CT head 7/27/2017 IMPRESSION:  1)  acute left MCA territory infarct with intraluminal thrombus in the proximal left M2 region of the middle cerebral artery.2)  extensive chronic ischemic changes and multiple old infarcts again noted as well.    available at the time of the eval CXR 7/1/2017The mediastinal cardiac silhouette is unremarkable. Aortic calcifications.Blunting of the costophrenic angles bilaterally consistent with pleural fluid and/or atelectasis. The lungs are otherwise clear.    No acute osseous finding.

## 2017-07-28 NOTE — SWALLOW BEDSIDE ASSESSMENT ADULT - MODE OF PRESENTATION
fed by clinician/spoon fed by clinician/cup fed by clinician/pt unable to draw liquids into the straw/cup/straw

## 2017-07-28 NOTE — DISCHARGE NOTE ADULT - SECONDARY DIAGNOSIS.
Type 2 diabetes mellitus without complication, unspecified long term insulin use status CAD (coronary artery disease) Essential hypertension

## 2017-07-28 NOTE — SWALLOW BEDSIDE ASSESSMENT ADULT - PHARYNGEAL PHASE
Decreased laryngeal elevation/Delayed pharyngeal swallow Cough post oral intake/inconsistent cough/Delayed pharyngeal swallow/Decreased laryngeal elevation

## 2017-07-28 NOTE — DISCHARGE NOTE ADULT - NS AS DC STROKE ED MATERIALS
Need for Followup After Discharge/Call 911 for Stroke/Stroke Warning Signs and Symptoms/Prescribed Medications/Stroke Education Booklet/Risk Factors for Stroke

## 2017-07-28 NOTE — DISCHARGE NOTE ADULT - PLAN OF CARE
stable pt with poor prognosis, family requesting long term placement. finger sticks q 6 hours with pre meal insulin and sliding scale and lantus at night continue home medication

## 2017-07-29 LAB
ANION GAP SERPL CALC-SCNC: 12 MMOL/L — SIGNIFICANT CHANGE UP (ref 5–17)
BASOPHILS # BLD AUTO: 0.1 K/UL — SIGNIFICANT CHANGE UP (ref 0–0.2)
BASOPHILS NFR BLD AUTO: 0.9 % — SIGNIFICANT CHANGE UP (ref 0–2)
BUN SERPL-MCNC: 19 MG/DL — SIGNIFICANT CHANGE UP (ref 7–23)
CALCIUM SERPL-MCNC: 8.4 MG/DL — LOW (ref 8.5–10.1)
CHLORIDE SERPL-SCNC: 107 MMOL/L — SIGNIFICANT CHANGE UP (ref 96–108)
CO2 SERPL-SCNC: 23 MMOL/L — SIGNIFICANT CHANGE UP (ref 22–31)
CREAT SERPL-MCNC: 0.89 MG/DL — SIGNIFICANT CHANGE UP (ref 0.5–1.3)
EOSINOPHIL # BLD AUTO: 0.1 K/UL — SIGNIFICANT CHANGE UP (ref 0–0.5)
EOSINOPHIL NFR BLD AUTO: 1 % — SIGNIFICANT CHANGE UP (ref 0–6)
GLUCOSE SERPL-MCNC: 111 MG/DL — HIGH (ref 70–99)
HCT VFR BLD CALC: 39.4 % — SIGNIFICANT CHANGE UP (ref 39–50)
HGB BLD-MCNC: 13.1 G/DL — SIGNIFICANT CHANGE UP (ref 13–17)
LYMPHOCYTES # BLD AUTO: 2.5 K/UL — SIGNIFICANT CHANGE UP (ref 1–3.3)
LYMPHOCYTES # BLD AUTO: 22.7 % — SIGNIFICANT CHANGE UP (ref 13–44)
MCHC RBC-ENTMCNC: 26.7 PG — LOW (ref 27–34)
MCHC RBC-ENTMCNC: 33.2 GM/DL — SIGNIFICANT CHANGE UP (ref 32–36)
MCV RBC AUTO: 80.3 FL — SIGNIFICANT CHANGE UP (ref 80–100)
MONOCYTES # BLD AUTO: 1 K/UL — HIGH (ref 0–0.9)
MONOCYTES NFR BLD AUTO: 8.8 % — SIGNIFICANT CHANGE UP (ref 2–14)
NEUTROPHILS # BLD AUTO: 7.2 K/UL — SIGNIFICANT CHANGE UP (ref 1.8–7.4)
NEUTROPHILS NFR BLD AUTO: 66.5 % — SIGNIFICANT CHANGE UP (ref 43–77)
PLATELET # BLD AUTO: 122 K/UL — LOW (ref 150–400)
POTASSIUM SERPL-MCNC: 3.3 MMOL/L — LOW (ref 3.5–5.3)
POTASSIUM SERPL-SCNC: 3.3 MMOL/L — LOW (ref 3.5–5.3)
RBC # BLD: 4.9 M/UL — SIGNIFICANT CHANGE UP (ref 4.2–5.8)
RBC # FLD: 14.9 % — SIGNIFICANT CHANGE UP (ref 11–15)
SODIUM SERPL-SCNC: 142 MMOL/L — SIGNIFICANT CHANGE UP (ref 135–145)
WBC # BLD: 10.8 K/UL — HIGH (ref 3.8–10.5)
WBC # FLD AUTO: 10.8 K/UL — HIGH (ref 3.8–10.5)

## 2017-07-29 PROCEDURE — 74000: CPT | Mod: 26

## 2017-07-29 RX ORDER — SODIUM CHLORIDE 9 MG/ML
1000 INJECTION, SOLUTION INTRAVENOUS
Qty: 0 | Refills: 0 | Status: DISCONTINUED | OUTPATIENT
Start: 2017-07-29 | End: 2017-07-29

## 2017-07-29 RX ORDER — POTASSIUM CHLORIDE 20 MEQ
20 PACKET (EA) ORAL
Qty: 0 | Refills: 0 | Status: COMPLETED | OUTPATIENT
Start: 2017-07-29 | End: 2017-07-29

## 2017-07-29 RX ADMIN — Medication 50 MILLIGRAM(S): at 06:15

## 2017-07-29 RX ADMIN — PANTOPRAZOLE SODIUM 40 MILLIGRAM(S): 20 TABLET, DELAYED RELEASE ORAL at 06:15

## 2017-07-29 RX ADMIN — LOSARTAN POTASSIUM 100 MILLIGRAM(S): 100 TABLET, FILM COATED ORAL at 06:15

## 2017-07-29 RX ADMIN — Medication 20 MILLIEQUIVALENT(S): at 12:59

## 2017-07-29 RX ADMIN — GABAPENTIN 300 MILLIGRAM(S): 400 CAPSULE ORAL at 06:15

## 2017-07-29 RX ADMIN — Medication 81 MILLIGRAM(S): at 11:15

## 2017-07-29 RX ADMIN — Medication 40 MILLIGRAM(S): at 06:15

## 2017-07-29 RX ADMIN — MEMANTINE HYDROCHLORIDE 10 MILLIGRAM(S): 10 TABLET ORAL at 06:15

## 2017-07-29 RX ADMIN — Medication 50 MILLIGRAM(S): at 18:39

## 2017-07-29 RX ADMIN — ENOXAPARIN SODIUM 40 MILLIGRAM(S): 100 INJECTION SUBCUTANEOUS at 11:15

## 2017-07-29 RX ADMIN — Medication 20 MILLIEQUIVALENT(S): at 09:57

## 2017-07-29 RX ADMIN — AMIODARONE HYDROCHLORIDE 200 MILLIGRAM(S): 400 TABLET ORAL at 06:15

## 2017-07-29 RX ADMIN — Medication 2: at 07:53

## 2017-07-29 RX ADMIN — MEMANTINE HYDROCHLORIDE 10 MILLIGRAM(S): 10 TABLET ORAL at 18:39

## 2017-07-29 RX ADMIN — Medication 20 MILLIEQUIVALENT(S): at 15:51

## 2017-07-29 RX ADMIN — AMIODARONE HYDROCHLORIDE 200 MILLIGRAM(S): 400 TABLET ORAL at 18:39

## 2017-07-29 NOTE — CHART NOTE - NSCHARTNOTEFT_GEN_A_CORE
Rashawn RIVERS note:    Dr Trent spoke with patient's daughter regarding NGT placement and she is agreeable to it.  D/w Dr Deleon who also suggests NGT insertion.  NGT placed.  Will f/u xray for confirmation of placement.

## 2017-07-29 NOTE — PROGRESS NOTE ADULT - SUBJECTIVE AND OBJECTIVE BOX
INTERVAL HPI/OVERNIGHT EVENTS:  Patient remains lethargic and non-verbal.  Does no swallow food.  Spoke to daughter and she agrees to starting him on NGT feeding.  Exhibits right dense hemiplegia.    RECENT RADIOLOGY & ADDITIONAL TESTS:    NEUROLOGICAL EXAM (Pertinent):  Vital Signs Last 24 Hrs  T(C): 36.4 (29 Jul 2017 11:02), Max: 37.7 (28 Jul 2017 17:57)  T(F): 97.6 (29 Jul 2017 11:02), Max: 99.8 (28 Jul 2017 17:57)  HR: 60 (29 Jul 2017 11:02) (60 - 72)  BP: 118/59 (29 Jul 2017 11:02) (118/59 - 134/70)  BP(mean): --  RR: 18 (29 Jul 2017 11:02) (16 - 18)  SpO2: 98% (29 Jul 2017 11:02) (98% - 98%)    MEDICATIONS  (STANDING):  losartan 100 milliGRAM(s) Oral daily  tamsulosin 0.4 milliGRAM(s) Oral at bedtime  amiodarone    Tablet 200 milliGRAM(s) Oral every 12 hours  atorvastatin 40 milliGRAM(s) Oral at bedtime  metoprolol 50 milliGRAM(s) Oral two times a day  furosemide    Tablet 40 milliGRAM(s) Oral daily  pantoprazole    Tablet 40 milliGRAM(s) Oral before breakfast  memantine 10 milliGRAM(s) Oral two times a day  insulin lispro (HumaLOG) corrective regimen sliding scale   SubCutaneous three times a day before meals  dextrose 5%. 1000 milliLiter(s) (50 mL/Hr) IV Continuous <Continuous>  dextrose 50% Injectable 12.5 Gram(s) IV Push once  dextrose 50% Injectable 25 Gram(s) IV Push once  dextrose 50% Injectable 25 Gram(s) IV Push once  aspirin enteric coated 81 milliGRAM(s) Oral daily  enoxaparin Injectable 40 milliGRAM(s) SubCutaneous daily  potassium chloride    Tablet ER 20 milliEquivalent(s) Oral every 2 hours  LORazepam   Injectable 2 milliGRAM(s) IntraMuscular once    MEDICATIONS  (PRN):  dextrose Gel 1 Dose(s) Oral once PRN Blood Glucose LESS THAN 70 milliGRAM(s)/deciliter  glucagon  Injectable 1 milliGRAM(s) IntraMuscular once PRN Glucose LESS THAN 70 milligrams/deciliter    LABS:                        13.1   10.8  )-----------( 122      ( 29 Jul 2017 06:54 )             39.4     07-29    142  |  107  |  19  ----------------------------<  111<H>  3.3<L>   |  23  |  0.89    Ca    8.4<L>      29 Jul 2017 06:54    Assessment & Opinion:   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  Vascular Dementia.  Failure to Thrive.  Recommendations:   DVT prophylaxis as ordered.   Continue current medications.  NGT feeding.  EEG ordered.  Will inform Dr. Deleon

## 2017-07-29 NOTE — CHART NOTE - NSCHARTNOTEFT_GEN_A_CORE
Medicine Hospitalist PA    Was notified by RN, pt pulled out NGT earlier. Requested to replace NGT for tube feeds. Placed 14 fr. Pt tolerated the procedure. Confirmed with auscultation will do CXR prior to use the ngtube.

## 2017-07-29 NOTE — PROGRESS NOTE ADULT - SUBJECTIVE AND OBJECTIVE BOX
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 No more hematuria    MEDICATIONS  (STANDING):  losartan 100 milliGRAM(s) Oral daily  tamsulosin 0.4 milliGRAM(s) Oral at bedtime  amiodarone    Tablet 200 milliGRAM(s) Oral every 12 hours  atorvastatin 40 milliGRAM(s) Oral at bedtime  metoprolol 50 milliGRAM(s) Oral two times a day  furosemide    Tablet 40 milliGRAM(s) Oral daily  pantoprazole    Tablet 40 milliGRAM(s) Oral before breakfast  memantine 10 milliGRAM(s) Oral two times a day  insulin lispro (HumaLOG) corrective regimen sliding scale   SubCutaneous three times a day before meals  dextrose 5%. 1000 milliLiter(s) (50 mL/Hr) IV Continuous <Continuous>  dextrose 50% Injectable 12.5 Gram(s) IV Push once  dextrose 50% Injectable 25 Gram(s) IV Push once  dextrose 50% Injectable 25 Gram(s) IV Push once  aspirin enteric coated 81 milliGRAM(s) Oral daily  enoxaparin Injectable 40 milliGRAM(s) SubCutaneous daily  potassium chloride    Tablet ER 20 milliEquivalent(s) Oral every 2 hours  LORazepam   Injectable 2 milliGRAM(s) IntraMuscular once    MEDICATIONS  (PRN):  dextrose Gel 1 Dose(s) Oral once PRN Blood Glucose LESS THAN 70 milliGRAM(s)/deciliter  glucagon  Injectable 1 milliGRAM(s) IntraMuscular once PRN Glucose LESS THAN 70 milligrams/deciliter    Allergies    No Known Allergies    Intolerances      Vital Signs Last 24 Hrs  T(C): 36.7 (2017 05:28), Max: 37.7 (2017 17:57)  T(F): 98 (2017 05:28), Max: 99.8 (2017 17:57)  HR: 66 (2017 05:28) (65 - 72)  BP: 134/70 (2017 05:28) (118/63 - 134/70)  BP(mean): --  RR: 18 (2017 05:28) (16 - 18)  SpO2: 98% (2017 05:28) (96% - 98%)    PHYSICAL EXAM:  GENERAL: NAD, well-groomed, well-developed  HEAD:  Atraumatic, Normocephalic  EYES: EOMI, PERRLA, conjunctiva and sclera clear  ENMT: No tonsillar erythema, exudates, or enlargement; Moist mucous membranes, Good dentition, No lesions  NECK: Supple, No JVD, Normal thyroid  NERVOUS SYSTEM:  Alert & Oriented X3, Good concentration; Motor Strength 5/5 B/L upper and lower extremities; DTRs 2+ intact and symmetric  CHEST/LUNG: Clear to percussion bilaterally; No rales, rhonchi, wheezing, or rubs  HEART: Regular rate and rhythm; No murmurs, rubs, or gallops  ABDOMEN: Soft, Nontender, Nondistended; Bowel sounds present  EXTREMITIES:  2+ Peripheral Pulses, No clubbing, cyanosis, or edema  LYMPH: No lymphadenopathy noted  SKIN: No rashes or lesions     @ 07:01  -   @ 07:00  --------------------------------------------------------  IN: 360 mL / OUT: 1075 mL / NET: -715 mL        LABS:                        13.1   10.8  )-----------( 122      ( 2017 06:54 )             39.4         142  |  107  |  19  ----------------------------<  111<H>  3.3<L>   |  23  |  0.89    Ca    8.4<L>      2017 06:54        Urinalysis Basic - ( 2017 10:51 )    Color: Yellow / Appearance: x / S.015 / pH: x  Gluc: x / Ketone: Negative  / Bili: Negative / Urobili: Negative mg/dL   Blood: x / Protein: 15 mg/dL / Nitrite: Negative   Leuk Esterase: Negative / RBC: x / WBC x   Sq Epi: x / Non Sq Epi: Few / Bacteria: x      CAPILLARY BLOOD GLUCOSE  154 (2017 07:51)  201 (2017 16:47)        Thyroid Stimulating Hormone, Serum: 0.555 uIU/mL ( @ 06:17)  Troponin I, Serum: .153 ng/mL ( @ 09:34)  CPK Mass Assay %: 3.7 % ( @ 09:34)  Troponin I, Serum: .059 ng/mL ( @ 07:32)  Troponin I, Serum: .049 ng/mL ( @ 03:09)  Troponin I, Serum: .056 ng/mL ( @ 20:16)  Troponin I, Serum: .046 ng/mL ( @ 11:48)    Cultures:            RADIOLOGY & ADDITIONAL TESTS:    Imaging Personally Reviewed:  [X] YES  [ ] NO    Consultant(s) Notes Reviewed:  [X] YES  [ ] NO    Care Discussed with Consultants/Other Providers [X] YES  [ ] NO

## 2017-07-30 LAB
ANION GAP SERPL CALC-SCNC: 11 MMOL/L — SIGNIFICANT CHANGE UP (ref 5–17)
BASOPHILS # BLD AUTO: 0.1 K/UL — SIGNIFICANT CHANGE UP (ref 0–0.2)
BASOPHILS NFR BLD AUTO: 1 % — SIGNIFICANT CHANGE UP (ref 0–2)
BUN SERPL-MCNC: 17 MG/DL — SIGNIFICANT CHANGE UP (ref 7–23)
CALCIUM SERPL-MCNC: 8.9 MG/DL — SIGNIFICANT CHANGE UP (ref 8.5–10.1)
CHLORIDE SERPL-SCNC: 111 MMOL/L — HIGH (ref 96–108)
CO2 SERPL-SCNC: 23 MMOL/L — SIGNIFICANT CHANGE UP (ref 22–31)
CREAT SERPL-MCNC: 0.89 MG/DL — SIGNIFICANT CHANGE UP (ref 0.5–1.3)
EOSINOPHIL # BLD AUTO: 0.1 K/UL — SIGNIFICANT CHANGE UP (ref 0–0.5)
EOSINOPHIL NFR BLD AUTO: 0.8 % — SIGNIFICANT CHANGE UP (ref 0–6)
GLUCOSE SERPL-MCNC: 156 MG/DL — HIGH (ref 70–99)
HCT VFR BLD CALC: 44.4 % — SIGNIFICANT CHANGE UP (ref 39–50)
HGB BLD-MCNC: 14 G/DL — SIGNIFICANT CHANGE UP (ref 13–17)
LYMPHOCYTES # BLD AUTO: 1.8 K/UL — SIGNIFICANT CHANGE UP (ref 1–3.3)
LYMPHOCYTES # BLD AUTO: 16.4 % — SIGNIFICANT CHANGE UP (ref 13–44)
MCHC RBC-ENTMCNC: 26.1 PG — LOW (ref 27–34)
MCHC RBC-ENTMCNC: 31.6 GM/DL — LOW (ref 32–36)
MCV RBC AUTO: 82.4 FL — SIGNIFICANT CHANGE UP (ref 80–100)
MONOCYTES # BLD AUTO: 1.1 K/UL — HIGH (ref 0–0.9)
MONOCYTES NFR BLD AUTO: 10 % — SIGNIFICANT CHANGE UP (ref 2–14)
NEUTROPHILS # BLD AUTO: 7.7 K/UL — HIGH (ref 1.8–7.4)
NEUTROPHILS NFR BLD AUTO: 71.9 % — SIGNIFICANT CHANGE UP (ref 43–77)
PLATELET # BLD AUTO: 126 K/UL — LOW (ref 150–400)
POTASSIUM SERPL-MCNC: 3.9 MMOL/L — SIGNIFICANT CHANGE UP (ref 3.5–5.3)
POTASSIUM SERPL-SCNC: 3.9 MMOL/L — SIGNIFICANT CHANGE UP (ref 3.5–5.3)
RBC # BLD: 5.39 M/UL — SIGNIFICANT CHANGE UP (ref 4.2–5.8)
RBC # FLD: 14.4 % — SIGNIFICANT CHANGE UP (ref 11–15)
SODIUM SERPL-SCNC: 145 MMOL/L — SIGNIFICANT CHANGE UP (ref 135–145)
WBC # BLD: 10.7 K/UL — HIGH (ref 3.8–10.5)
WBC # FLD AUTO: 10.7 K/UL — HIGH (ref 3.8–10.5)

## 2017-07-30 PROCEDURE — 43752 NASAL/OROGASTRIC W/TUBE PLMT: CPT

## 2017-07-30 PROCEDURE — 71010: CPT | Mod: 26,77

## 2017-07-30 PROCEDURE — 71010: CPT | Mod: 26

## 2017-07-30 PROCEDURE — 74000: CPT | Mod: 26

## 2017-07-30 RX ORDER — ASPIRIN/CALCIUM CARB/MAGNESIUM 324 MG
81 TABLET ORAL DAILY
Qty: 0 | Refills: 0 | Status: DISCONTINUED | OUTPATIENT
Start: 2017-07-30 | End: 2017-08-01

## 2017-07-30 RX ORDER — FUROSEMIDE 40 MG
40 TABLET ORAL ONCE
Qty: 0 | Refills: 0 | Status: COMPLETED | OUTPATIENT
Start: 2017-07-30 | End: 2017-07-30

## 2017-07-30 RX ADMIN — ENOXAPARIN SODIUM 40 MILLIGRAM(S): 100 INJECTION SUBCUTANEOUS at 14:19

## 2017-07-30 RX ADMIN — Medication: at 13:08

## 2017-07-30 RX ADMIN — Medication 2: at 17:04

## 2017-07-30 RX ADMIN — Medication: at 08:30

## 2017-07-30 RX ADMIN — MEMANTINE HYDROCHLORIDE 10 MILLIGRAM(S): 10 TABLET ORAL at 22:32

## 2017-07-30 RX ADMIN — Medication 40 MILLIGRAM(S): at 06:10

## 2017-07-30 RX ADMIN — Medication 50 MILLIGRAM(S): at 22:33

## 2017-07-30 RX ADMIN — ATORVASTATIN CALCIUM 40 MILLIGRAM(S): 80 TABLET, FILM COATED ORAL at 22:32

## 2017-07-30 RX ADMIN — TAMSULOSIN HYDROCHLORIDE 0.4 MILLIGRAM(S): 0.4 CAPSULE ORAL at 22:33

## 2017-07-30 RX ADMIN — AMIODARONE HYDROCHLORIDE 200 MILLIGRAM(S): 400 TABLET ORAL at 22:33

## 2017-07-30 NOTE — PROGRESS NOTE ADULT - SUBJECTIVE AND OBJECTIVE BOX
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 NG tube is inserted for feeding.     MEDICATIONS  (STANDING):  losartan 100 milliGRAM(s) Oral daily  tamsulosin 0.4 milliGRAM(s) Oral at bedtime  amiodarone    Tablet 200 milliGRAM(s) Oral every 12 hours  atorvastatin 40 milliGRAM(s) Oral at bedtime  metoprolol 50 milliGRAM(s) Oral two times a day  furosemide    Tablet 40 milliGRAM(s) Oral daily  pantoprazole    Tablet 40 milliGRAM(s) Oral before breakfast  memantine 10 milliGRAM(s) Oral two times a day  insulin lispro (HumaLOG) corrective regimen sliding scale   SubCutaneous three times a day before meals  dextrose 5%. 1000 milliLiter(s) (50 mL/Hr) IV Continuous <Continuous>  dextrose 50% Injectable 12.5 Gram(s) IV Push once  dextrose 50% Injectable 25 Gram(s) IV Push once  dextrose 50% Injectable 25 Gram(s) IV Push once  aspirin enteric coated 81 milliGRAM(s) Oral daily  enoxaparin Injectable 40 milliGRAM(s) SubCutaneous daily  LORazepam   Injectable 2 milliGRAM(s) IntraMuscular once    MEDICATIONS  (PRN):  dextrose Gel 1 Dose(s) Oral once PRN Blood Glucose LESS THAN 70 milliGRAM(s)/deciliter  glucagon  Injectable 1 milliGRAM(s) IntraMuscular once PRN Glucose LESS THAN 70 milligrams/deciliter    Allergies    No Known Allergies    Intolerances      Vital Signs Last 24 Hrs  T(C): 37 (30 Jul 2017 10:59), Max: 37.7 (29 Jul 2017 17:28)  T(F): 98.6 (30 Jul 2017 10:59), Max: 99.8 (29 Jul 2017 17:28)  HR: 85 (30 Jul 2017 10:59) (67 - 85)  BP: 151/85 (30 Jul 2017 10:59) (114/65 - 151/85)  BP(mean): --  RR: 18 (30 Jul 2017 10:59) (18 - 18)  SpO2: 93% (30 Jul 2017 10:59) (93% - 100%)    PHYSICAL EXAM:  GENERAL: NAD, well-groomed, well-developed  HEAD:  Atraumatic, Normocephalic  EYES: EOMI, PERRLA, conjunctiva and sclera clear  ENMT: No tonsillar erythema, exudates, or enlargement; Moist mucous membranes, Good dentition, No lesions  NECK: Supple, No JVD, Normal thyroid  NERVOUS SYSTEM:  Alert & Oriented X3, Good concentration; Motor Strength 5/5 B/L upper and lower extremities; DTRs 2+ intact and symmetric  CHEST/LUNG: Clear to percussion bilaterally; No rales, rhonchi, wheezing, or rubs  HEART: Regular rate and rhythm; No murmurs, rubs, or gallops  ABDOMEN: Soft, Nontender, Nondistended; Bowel sounds present  EXTREMITIES:  2+ Peripheral Pulses, No clubbing, cyanosis, or edema  LYMPH: No lymphadenopathy noted  SKIN: No rashes or lesions    07-29 @ 07:01  -  07-30 @ 07:00  --------------------------------------------------------  IN: 0 mL / OUT: 1550 mL / NET: -1550 mL        LABS:                        14.0   10.7  )-----------( 126      ( 30 Jul 2017 06:51 )             44.4     07-30    145  |  111<H>  |  17  ----------------------------<  156<H>  3.9   |  23  |  0.89    Ca    8.9      30 Jul 2017 06:51          CAPILLARY BLOOD GLUCOSE  160 (30 Jul 2017 07:50)  122 (29 Jul 2017 17:18)  145 (29 Jul 2017 12:50)        Thyroid Stimulating Hormone, Serum: 0.555 uIU/mL (07-28 @ 06:17)  Troponin I, Serum: .153 ng/mL (07-27 @ 09:34)  CPK Mass Assay %: 3.7 % (07-27 @ 09:34)  Troponin I, Serum: .059 ng/mL (07-01 @ 07:32)    Cultures:            RADIOLOGY & ADDITIONAL TESTS:    Imaging Personally Reviewed:  [X] YES  [ ] NO    Consultant(s) Notes Reviewed:  [X] YES  [ ] NO    Care Discussed with Consultants/Other Providers [X] YES  [ ] NO

## 2017-07-30 NOTE — PROGRESS NOTE ADULT - SUBJECTIVE AND OBJECTIVE BOX
INTERVAL HPI/OVERNIGHT EVENTS:  Pt started on NGT feeding.  Neuro status unchanged.    NEUROLOGICAL EXAM (Pertinent):  Vital Signs Last 24 Hrs  T(C): 37 (30 Jul 2017 10:59), Max: 37.7 (29 Jul 2017 17:28)  T(F): 98.6 (30 Jul 2017 10:59), Max: 99.8 (29 Jul 2017 17:28)  HR: 85 (30 Jul 2017 10:59) (67 - 85)  BP: 151/85 (30 Jul 2017 10:59) (114/65 - 151/85)  BP(mean): --  RR: 18 (30 Jul 2017 10:59) (18 - 18)  SpO2: 93% (30 Jul 2017 10:59) (93% - 100%)    MEDICATIONS  (STANDING):  losartan 100 milliGRAM(s) Oral daily  tamsulosin 0.4 milliGRAM(s) Oral at bedtime  amiodarone    Tablet 200 milliGRAM(s) Oral every 12 hours  atorvastatin 40 milliGRAM(s) Oral at bedtime  metoprolol 50 milliGRAM(s) Oral two times a day  furosemide    Tablet 40 milliGRAM(s) Oral daily  pantoprazole    Tablet 40 milliGRAM(s) Oral before breakfast  memantine 10 milliGRAM(s) Oral two times a day  insulin lispro (HumaLOG) corrective regimen sliding scale   SubCutaneous three times a day before meals  dextrose 5%. 1000 milliLiter(s) (50 mL/Hr) IV Continuous <Continuous>  dextrose 50% Injectable 12.5 Gram(s) IV Push once  dextrose 50% Injectable 25 Gram(s) IV Push once  dextrose 50% Injectable 25 Gram(s) IV Push once  enoxaparin Injectable 40 milliGRAM(s) SubCutaneous daily  aspirin  chewable 81 milliGRAM(s) Oral daily    MEDICATIONS  (PRN):  dextrose Gel 1 Dose(s) Oral once PRN Blood Glucose LESS THAN 70 milliGRAM(s)/deciliter  glucagon  Injectable 1 milliGRAM(s) IntraMuscular once PRN Glucose LESS THAN 70 milligrams/deciliter    LABS:                        14.0   10.7  )-----------( 126      ( 30 Jul 2017 06:51 )             44.4     07-30    145  |  111<H>  |  17  ----------------------------<  156<H>  3.9   |  23  |  0.89    Ca    8.9      30 Jul 2017 06:51    Assessment & Opinion:   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  Vascular Dementia.  Failure to Thrive.  Recommendations:   DVT prophylaxis as ordered.   Continue current medications.  NGT feeding.  EEG ordered.

## 2017-07-30 NOTE — CHART NOTE - NSCHARTNOTEFT_GEN_A_CORE
House- Medicine NP:    Called by RN for NGT insertion. Patient is a 77y old  Male who presents with a chief complaint of Left facial drooping (28 Jul 2017 14:50)   Order noted on chart.    T(C): 37 (07-30-17 @ 10:59), Max: 37.1 (07-30-17 @ 00:17)  T(F): 98.6 (07-30-17 @ 10:59), Max: 98.8 (07-30-17 @ 00:17)  HR: 85 (07-30-17 @ 10:59) (68 - 85)  BP: 151/85 (07-30-17 @ 10:59) (114/65 - 151/85)  RR: 18 (07-30-17 @ 10:59) (18 - 18)  SpO2: 93% (07-30-17 @ 10:59) (93% - 100%)      NGT inserted. Pt tolerated procedure well. Placement verified via auscultation. KUB ordered to confirm placement.

## 2017-07-31 LAB
ANION GAP SERPL CALC-SCNC: 13 MMOL/L — SIGNIFICANT CHANGE UP (ref 5–17)
BUN SERPL-MCNC: 32 MG/DL — HIGH (ref 7–23)
CALCIUM SERPL-MCNC: 9.5 MG/DL — SIGNIFICANT CHANGE UP (ref 8.5–10.1)
CHLORIDE SERPL-SCNC: 107 MMOL/L — SIGNIFICANT CHANGE UP (ref 96–108)
CO2 SERPL-SCNC: 24 MMOL/L — SIGNIFICANT CHANGE UP (ref 22–31)
CREAT SERPL-MCNC: 1.21 MG/DL — SIGNIFICANT CHANGE UP (ref 0.5–1.3)
GLUCOSE SERPL-MCNC: 279 MG/DL — HIGH (ref 70–99)
MAGNESIUM SERPL-MCNC: 2.4 MG/DL — SIGNIFICANT CHANGE UP (ref 1.6–2.6)
POTASSIUM SERPL-MCNC: 3.6 MMOL/L — SIGNIFICANT CHANGE UP (ref 3.5–5.3)
POTASSIUM SERPL-SCNC: 3.6 MMOL/L — SIGNIFICANT CHANGE UP (ref 3.5–5.3)
SODIUM SERPL-SCNC: 144 MMOL/L — SIGNIFICANT CHANGE UP (ref 135–145)

## 2017-07-31 RX ADMIN — AMIODARONE HYDROCHLORIDE 200 MILLIGRAM(S): 400 TABLET ORAL at 05:36

## 2017-07-31 RX ADMIN — AMIODARONE HYDROCHLORIDE 200 MILLIGRAM(S): 400 TABLET ORAL at 17:13

## 2017-07-31 RX ADMIN — Medication 50 MILLIGRAM(S): at 05:37

## 2017-07-31 RX ADMIN — ENOXAPARIN SODIUM 40 MILLIGRAM(S): 100 INJECTION SUBCUTANEOUS at 13:00

## 2017-07-31 RX ADMIN — MEMANTINE HYDROCHLORIDE 10 MILLIGRAM(S): 10 TABLET ORAL at 17:13

## 2017-07-31 RX ADMIN — Medication 81 MILLIGRAM(S): at 13:00

## 2017-07-31 RX ADMIN — Medication 6: at 07:41

## 2017-07-31 RX ADMIN — ATORVASTATIN CALCIUM 40 MILLIGRAM(S): 80 TABLET, FILM COATED ORAL at 22:26

## 2017-07-31 RX ADMIN — MEMANTINE HYDROCHLORIDE 10 MILLIGRAM(S): 10 TABLET ORAL at 05:36

## 2017-07-31 RX ADMIN — TAMSULOSIN HYDROCHLORIDE 0.4 MILLIGRAM(S): 0.4 CAPSULE ORAL at 22:26

## 2017-07-31 RX ADMIN — Medication 4: at 10:45

## 2017-07-31 RX ADMIN — Medication 2: at 17:11

## 2017-07-31 RX ADMIN — Medication 50 MILLIGRAM(S): at 17:13

## 2017-07-31 RX ADMIN — LOSARTAN POTASSIUM 100 MILLIGRAM(S): 100 TABLET, FILM COATED ORAL at 05:37

## 2017-07-31 RX ADMIN — Medication 40 MILLIGRAM(S): at 05:37

## 2017-07-31 NOTE — DIETITIAN INITIAL EVALUATION ADULT. - ENERGY NEEDS
BMI = 21.2, HT: 5'7", WT- 135.8(51.6kg)7/27, IBW= 148 +/_ 10%, %, IBW = 91.7%, % UBW = 95.4%, % wt loss x 1 month = 6.54%

## 2017-07-31 NOTE — DIETITIAN INITIAL EVALUATION ADULT. - PERTINENT LABORATORY DATA
07-30 Na145 mmol/L Glu 156 mg/dL<H> K+ 3.9 mmol/L Cr  0.89 mg/dL BUN 17 mg/dL Phos n/a   Alb n/a   PAB n/a   FS - 7:41- 267, HgbA1c = 6.6

## 2017-07-31 NOTE — PROGRESS NOTE ADULT - SUBJECTIVE AND OBJECTIVE BOX
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 Tolerated NG feeding. Low grade fever.    MEDICATIONS  (STANDING):  losartan 100 milliGRAM(s) Oral daily  tamsulosin 0.4 milliGRAM(s) Oral at bedtime  amiodarone    Tablet 200 milliGRAM(s) Oral every 12 hours  atorvastatin 40 milliGRAM(s) Oral at bedtime  metoprolol 50 milliGRAM(s) Oral two times a day  furosemide    Tablet 40 milliGRAM(s) Oral daily  pantoprazole    Tablet 40 milliGRAM(s) Oral before breakfast  memantine 10 milliGRAM(s) Oral two times a day  insulin lispro (HumaLOG) corrective regimen sliding scale   SubCutaneous three times a day before meals  dextrose 5%. 1000 milliLiter(s) (50 mL/Hr) IV Continuous <Continuous>  dextrose 50% Injectable 12.5 Gram(s) IV Push once  dextrose 50% Injectable 25 Gram(s) IV Push once  dextrose 50% Injectable 25 Gram(s) IV Push once  enoxaparin Injectable 40 milliGRAM(s) SubCutaneous daily  aspirin  chewable 81 milliGRAM(s) Oral daily    MEDICATIONS  (PRN):  dextrose Gel 1 Dose(s) Oral once PRN Blood Glucose LESS THAN 70 milliGRAM(s)/deciliter  glucagon  Injectable 1 milliGRAM(s) IntraMuscular once PRN Glucose LESS THAN 70 milligrams/deciliter    Allergies    No Known Allergies    Intolerances      Vital Signs Last 24 Hrs  T(C): 37.4 (31 Jul 2017 10:31), Max: 37.7 (30 Jul 2017 18:47)  T(F): 99.3 (31 Jul 2017 10:31), Max: 99.8 (30 Jul 2017 18:47)  HR: 80 (31 Jul 2017 10:31) (80 - 97)  BP: 123/72 (31 Jul 2017 10:31) (121/69 - 132/89)  BP(mean): --  RR: 18 (31 Jul 2017 10:31) (18 - 18)  SpO2: 100% (31 Jul 2017 10:31) (98% - 100%)    PHYSICAL EXAM:  GENERAL: NAD, well-groomed, well-developed  HEAD:  Atraumatic, Normocephalic  EYES: EOMI, PERRLA, conjunctiva and sclera clear  ENMT: No tonsillar erythema, exudates, or enlargement; Moist mucous membranes, Good dentition, No lesions  NECK: Supple, No JVD, Normal thyroid  NERVOUS SYSTEM:  Alert & Oriented X3, Good concentration; Motor Strength 5/5 B/L upper and lower extremities; DTRs 2+ intact and symmetric  CHEST/LUNG: Clear to percussion bilaterally; No rales, rhonchi, wheezing, or rubs  HEART: Regular rate and rhythm; No murmurs, rubs, or gallops  ABDOMEN: Soft, Nontender, Nondistended; Bowel sounds present  EXTREMITIES:  2+ Peripheral Pulses, No clubbing, cyanosis, or edema  LYMPH: No lymphadenopathy noted  SKIN: No rashes or lesions    07-30 @ 07:01  -  07-31 @ 07:00  --------------------------------------------------------  IN: 0 mL / OUT: 400 mL / NET: -400 mL        LABS:                        14.0   10.7  )-----------( 126      ( 30 Jul 2017 06:51 )             44.4     07-31    144  |  107  |  32<H>  ----------------------------<  279<H>  3.6   |  24  |  1.21    Ca    9.5      31 Jul 2017 09:55  Mg     2.4     07-31          CAPILLARY BLOOD GLUCOSE  245 (31 Jul 2017 10:35)  267 (31 Jul 2017 07:41)  179 (30 Jul 2017 22:19)  186 (30 Jul 2017 16:29)        Thyroid Stimulating Hormone, Serum: 0.555 uIU/mL (07-28 @ 06:17)  Troponin I, Serum: .153 ng/mL (07-27 @ 09:34)  CPK Mass Assay %: 3.7 % (07-27 @ 09:34)    Cultures:            RADIOLOGY & ADDITIONAL TESTS:    Imaging Personally Reviewed:  [X] YES  [ ] NO    Consultant(s) Notes Reviewed:  [X] YES  [ ] NO    Care Discussed with Consultants/Other Providers [X] YES  [ ] NO

## 2017-07-31 NOTE — CHART NOTE - NSCHARTNOTEFT_GEN_A_CORE
Upon Nutritional Assessment by the Registered Dietitian your patient was determined to meet criteria / has evidence of the following diagnosis/diagnoses:          [ ]  Mild Protein Calorie Malnutrition        [ ]  Moderate Protein Calorie Malnutrition        [x ] Severe Protein Calorie Malnutrition        [ ] Unspecified Protein Calorie Malnutrition        [x ] Underweight / BMI <19        [ ] Morbid Obesity / BMI > 40      Findings as based on:  •  Comprehensive nutrition assessment and consultation  •  Calorie counts (nutrient intake analysis)  •  Food acceptance and intake status from observations by staff  •  Follow up  •  Patient education  •  Intervention secondary to interdisciplinary rounds  •   concerns      Treatment:    The following diet has been recommended: Glucerna 1.2 Via NGT to Goal rate of 60 ml/hr = 1728 kcal, 86.4 g pro & 1159 ml fluid      PROVIDER Section:     By signing this assessment you are acknowledging and agree with the diagnosis/diagnoses assigned by the Registered Dietitian    Comments:

## 2017-07-31 NOTE — DIETITIAN INITIAL EVALUATION ADULT. - NS AS NUTRI INTERV ENTERAL NUTRITION
Concentration/Recommend: NGT feeding Glucerna 1.2 via NGT 30 -> 60 ml/hr (goal rate) = 1728 kcal, 86.4 g pro & 1159 ml/Volume/Rate/Composition

## 2017-07-31 NOTE — DIETITIAN INITIAL EVALUATION ADULT. - OTHER INFO
Pt seen today for Enteral Nutrition. Unable to interview pt due to cognitive issues && no family present @ bedside. Currently receiving Glucerna 1.2 via NGT @ 30 ml/hr = 864 kcal & & 43.2 g pro. No residual. Prior wt obtained from last admission.

## 2017-07-31 NOTE — DIETITIAN INITIAL EVALUATION ADULT. - PHYSICAL APPEARANCE
underweight/BMI = 21.2, no edema noted; Nutrition focused physical exam conducted ; found signs of malnutrition [ ]absent [x ]present.  Subcutaneous fat loss: [wnl ] Orbital fat pads region, [wnl ]Buccal fat region, [wnl ]Triceps region,  [wnl ]Ribs region.  Muscle wasting: [moderate]Temples region, [moderate ]Clavicle region, [mild ]Shoulder region, [mild ]Scapula region, [unable ]Interosseous region,  [moderate ]thigh region, [moderate ]Calf region

## 2017-07-31 NOTE — DIETITIAN INITIAL EVALUATION ADULT. - FACTORS AFF FOOD INTAKE
R facial droop; s/p swallow eval 7/28- Recommend for pureed w/ nectar thick liquids.. pt is non - verbal & not alert. @ present./other (specify)

## 2017-08-01 DIAGNOSIS — E11.9 TYPE 2 DIABETES MELLITUS WITHOUT COMPLICATIONS: ICD-10-CM

## 2017-08-01 DIAGNOSIS — R94.6 ABNORMAL RESULTS OF THYROID FUNCTION STUDIES: ICD-10-CM

## 2017-08-01 LAB
ALBUMIN SERPL ELPH-MCNC: 2.7 G/DL — LOW (ref 3.3–5)
ALP SERPL-CCNC: 131 U/L — HIGH (ref 40–120)
ALT FLD-CCNC: 42 U/L — SIGNIFICANT CHANGE UP (ref 12–78)
ANION GAP SERPL CALC-SCNC: 11 MMOL/L — SIGNIFICANT CHANGE UP (ref 5–17)
AST SERPL-CCNC: 59 U/L — HIGH (ref 15–37)
BASOPHILS # BLD AUTO: 0.1 K/UL — SIGNIFICANT CHANGE UP (ref 0–0.2)
BASOPHILS NFR BLD AUTO: 0.5 % — SIGNIFICANT CHANGE UP (ref 0–2)
BILIRUB SERPL-MCNC: 0.7 MG/DL — SIGNIFICANT CHANGE UP (ref 0.2–1.2)
BUN SERPL-MCNC: 34 MG/DL — HIGH (ref 7–23)
CALCIUM SERPL-MCNC: 9.5 MG/DL — SIGNIFICANT CHANGE UP (ref 8.5–10.1)
CHLORIDE SERPL-SCNC: 110 MMOL/L — HIGH (ref 96–108)
CO2 SERPL-SCNC: 26 MMOL/L — SIGNIFICANT CHANGE UP (ref 22–31)
CREAT SERPL-MCNC: 0.98 MG/DL — SIGNIFICANT CHANGE UP (ref 0.5–1.3)
EOSINOPHIL # BLD AUTO: 0 K/UL — SIGNIFICANT CHANGE UP (ref 0–0.5)
EOSINOPHIL NFR BLD AUTO: 0 % — SIGNIFICANT CHANGE UP (ref 0–6)
GLUCOSE SERPL-MCNC: 293 MG/DL — HIGH (ref 70–99)
HCT VFR BLD CALC: 46.9 % — SIGNIFICANT CHANGE UP (ref 39–50)
HGB BLD-MCNC: 14.9 G/DL — SIGNIFICANT CHANGE UP (ref 13–17)
LYMPHOCYTES # BLD AUTO: 1.2 K/UL — SIGNIFICANT CHANGE UP (ref 1–3.3)
LYMPHOCYTES # BLD AUTO: 10.6 % — LOW (ref 13–44)
MCHC RBC-ENTMCNC: 26.2 PG — LOW (ref 27–34)
MCHC RBC-ENTMCNC: 31.8 GM/DL — LOW (ref 32–36)
MCV RBC AUTO: 82.6 FL — SIGNIFICANT CHANGE UP (ref 80–100)
MONOCYTES # BLD AUTO: 1.1 K/UL — HIGH (ref 0–0.9)
MONOCYTES NFR BLD AUTO: 9.9 % — SIGNIFICANT CHANGE UP (ref 2–14)
NEUTROPHILS # BLD AUTO: 8.9 K/UL — HIGH (ref 1.8–7.4)
NEUTROPHILS NFR BLD AUTO: 79.1 % — HIGH (ref 43–77)
PLATELET # BLD AUTO: 176 K/UL — SIGNIFICANT CHANGE UP (ref 150–400)
POTASSIUM SERPL-MCNC: 3.6 MMOL/L — SIGNIFICANT CHANGE UP (ref 3.5–5.3)
POTASSIUM SERPL-SCNC: 3.6 MMOL/L — SIGNIFICANT CHANGE UP (ref 3.5–5.3)
PROT SERPL-MCNC: 7.4 GM/DL — SIGNIFICANT CHANGE UP (ref 6–8.3)
RBC # BLD: 5.68 M/UL — SIGNIFICANT CHANGE UP (ref 4.2–5.8)
RBC # FLD: 14.9 % — SIGNIFICANT CHANGE UP (ref 11–15)
SODIUM SERPL-SCNC: 147 MMOL/L — HIGH (ref 135–145)
WBC # BLD: 11.3 K/UL — HIGH (ref 3.8–10.5)
WBC # FLD AUTO: 11.3 K/UL — HIGH (ref 3.8–10.5)

## 2017-08-01 PROCEDURE — 70551 MRI BRAIN STEM W/O DYE: CPT | Mod: 26

## 2017-08-01 RX ORDER — INSULIN LISPRO 100/ML
4 VIAL (ML) SUBCUTANEOUS
Qty: 0 | Refills: 0 | Status: DISCONTINUED | OUTPATIENT
Start: 2017-08-01 | End: 2017-08-03

## 2017-08-01 RX ORDER — INSULIN GLARGINE 100 [IU]/ML
8 INJECTION, SOLUTION SUBCUTANEOUS AT BEDTIME
Qty: 0 | Refills: 0 | Status: DISCONTINUED | OUTPATIENT
Start: 2017-08-01 | End: 2017-08-04

## 2017-08-01 RX ORDER — ACETAMINOPHEN 500 MG
650 TABLET ORAL EVERY 6 HOURS
Qty: 0 | Refills: 0 | Status: DISCONTINUED | OUTPATIENT
Start: 2017-08-01 | End: 2017-08-04

## 2017-08-01 RX ADMIN — Medication 4: at 16:35

## 2017-08-01 RX ADMIN — INSULIN GLARGINE 8 UNIT(S): 100 INJECTION, SOLUTION SUBCUTANEOUS at 22:33

## 2017-08-01 RX ADMIN — ENOXAPARIN SODIUM 40 MILLIGRAM(S): 100 INJECTION SUBCUTANEOUS at 12:50

## 2017-08-01 RX ADMIN — LOSARTAN POTASSIUM 100 MILLIGRAM(S): 100 TABLET, FILM COATED ORAL at 06:56

## 2017-08-01 RX ADMIN — Medication 650 MILLIGRAM(S): at 18:26

## 2017-08-01 RX ADMIN — Medication 4 UNIT(S): at 16:36

## 2017-08-01 RX ADMIN — Medication 50 MILLIGRAM(S): at 18:16

## 2017-08-01 RX ADMIN — TAMSULOSIN HYDROCHLORIDE 0.4 MILLIGRAM(S): 0.4 CAPSULE ORAL at 22:31

## 2017-08-01 RX ADMIN — PANTOPRAZOLE SODIUM 40 MILLIGRAM(S): 20 TABLET, DELAYED RELEASE ORAL at 09:22

## 2017-08-01 RX ADMIN — Medication 4 UNIT(S): at 12:47

## 2017-08-01 RX ADMIN — Medication 50 MILLIGRAM(S): at 06:56

## 2017-08-01 RX ADMIN — AMIODARONE HYDROCHLORIDE 200 MILLIGRAM(S): 400 TABLET ORAL at 06:56

## 2017-08-01 RX ADMIN — Medication 6: at 12:46

## 2017-08-01 RX ADMIN — MEMANTINE HYDROCHLORIDE 10 MILLIGRAM(S): 10 TABLET ORAL at 18:16

## 2017-08-01 RX ADMIN — Medication 81 MILLIGRAM(S): at 12:49

## 2017-08-01 RX ADMIN — AMIODARONE HYDROCHLORIDE 200 MILLIGRAM(S): 400 TABLET ORAL at 18:17

## 2017-08-01 RX ADMIN — ATORVASTATIN CALCIUM 40 MILLIGRAM(S): 80 TABLET, FILM COATED ORAL at 22:31

## 2017-08-01 RX ADMIN — MEMANTINE HYDROCHLORIDE 10 MILLIGRAM(S): 10 TABLET ORAL at 06:56

## 2017-08-01 RX ADMIN — Medication 40 MILLIGRAM(S): at 06:57

## 2017-08-01 RX ADMIN — Medication 6: at 09:23

## 2017-08-01 NOTE — CHART NOTE - NSCHARTNOTEFT_GEN_A_CORE
Results of MRI reviewed   Case discussed with Dr Trent  Aspirin and Lovenox discontinued  IPCD for DVT prophylaxis

## 2017-08-01 NOTE — PROGRESS NOTE ADULT - SUBJECTIVE AND OBJECTIVE BOX
Assessment:  New CVA  Chronic systolic CHF  Mild troponin leak in a setting of CVA, not likely AMI  Continue current CV management as tolerated.  Temp 101

## 2017-08-01 NOTE — CONSULT NOTE ADULT - SUBJECTIVE AND OBJECTIVE BOX
full consult dictated    Message left for family to come in to sign consent  will schedule peg once consent signed full consult dictated    Message left for family to come in to sign consent  will schedule peg once consent signed  Although speech path had recommended puree with thickened liquids on 7/28 - pt not responsive and on NGT feedings

## 2017-08-01 NOTE — CONSULT NOTE ADULT - PROBLEM SELECTOR RECOMMENDATION 9
finger sticks are increased while inpatient   HbA1C 6.6 and also hyperglycemia secondary to ? infection   increase the dose of insulin   added Lantus 8 units and prandial lispro 4 units   Patient can resume  home regimen upon discharge

## 2017-08-01 NOTE — PROGRESS NOTE ADULT - SUBJECTIVE AND OBJECTIVE BOX
INTERVAL HPI/OVERNIGHT EVENTS:  RECENT RADIOLOGY & ADDITIONAL TESTS:  MRI:  Acute left CHARLENE and MCA territory distribution infarcts as above. Mild hemorrhagic conversion left frontal lobe MCA territory infarct.  Stable chronic right PCA distribution infarct and moderate chronic microvascular change.    Subjective Complaints:  Pt remains mute; eyes closed shut, but exhibits purposeful left upper extremity movements.  Right dense plegia still present.  No new neuro change with examination.  Daughter visiting, spoke to her about placement.     NEUROLOGICAL EXAM (Pertinent):  Vital Signs Last 24 Hrs  T(C): 38.4 (01 Aug 2017 16:31), Max: 38.4 (01 Aug 2017 16:31)  T(F): 101.1 (01 Aug 2017 16:31), Max: 101.1 (01 Aug 2017 16:31)  HR: 82 (01 Aug 2017 17:13) (82 - 106)  BP: 111/70 (01 Aug 2017 17:13) (111/70 - 134/77)  BP(mean): --  RR: 17 (01 Aug 2017 17:13) (17 - 18)  SpO2: 96% (01 Aug 2017 17:13) (96% - 100%)    MEDICATIONS  (STANDING):  losartan 100 milliGRAM(s) Oral daily  tamsulosin 0.4 milliGRAM(s) Oral at bedtime  amiodarone    Tablet 200 milliGRAM(s) Oral every 12 hours  atorvastatin 40 milliGRAM(s) Oral at bedtime  metoprolol 50 milliGRAM(s) Oral two times a day  furosemide    Tablet 40 milliGRAM(s) Oral daily  pantoprazole    Tablet 40 milliGRAM(s) Oral before breakfast  memantine 10 milliGRAM(s) Oral two times a day  insulin lispro (HumaLOG) corrective regimen sliding scale   SubCutaneous three times a day before meals  dextrose 5%. 1000 milliLiter(s) (50 mL/Hr) IV Continuous <Continuous>  dextrose 50% Injectable 12.5 Gram(s) IV Push once  dextrose 50% Injectable 25 Gram(s) IV Push once  dextrose 50% Injectable 25 Gram(s) IV Push once  insulin glargine Injectable (LANTUS) 8 Unit(s) SubCutaneous at bedtime  insulin lispro Injectable (HumaLOG) 4 Unit(s) SubCutaneous before breakfast  insulin lispro Injectable (HumaLOG) 4 Unit(s) SubCutaneous before lunch  insulin lispro Injectable (HumaLOG) 4 Unit(s) SubCutaneous before dinner    MEDICATIONS  (PRN):  dextrose Gel 1 Dose(s) Oral once PRN Blood Glucose LESS THAN 70 milliGRAM(s)/deciliter  glucagon  Injectable 1 milliGRAM(s) IntraMuscular once PRN Glucose LESS THAN 70 milligrams/deciliter  acetaminophen    Suspension 650 milliGRAM(s) Oral every 6 hours PRN For Temp greater than 38 C (100.4 F)    LABS:                        14.9   11.3  )-----------( 176      ( 01 Aug 2017 07:26 )             46.9     08-01    147<H>  |  110<H>  |  34<H>  ----------------------------<  293<H>  3.6   |  26  |  0.98    Ca    9.5      01 Aug 2017 07:26  Mg     2.4     07-31    TPro  7.4  /  Alb  2.7<L>  /  TBili  0.7  /  DBili  x   /  AST  59<H>  /  ALT  42  /  AlkPhos  131<H>  08-01    Assessment & Opinion:   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  MRI:  Acute left CHARLENE and MCA territory distribution infarcts as above. Mild hemorrhagic conversion left frontal lobe MCA territory infarct.  Stable chronic right PCA distribution infarct and moderate chronic microvascular change. Vascular Dementia.  Failure to Thrive.  Recommendations:   DVT prophylaxis as ordered.   Continue current medications.  Placement to a subacute rehab facility.

## 2017-08-01 NOTE — CONSULT NOTE ADULT - SUBJECTIVE AND OBJECTIVE BOX
Patient is a 77y old  Male who presents with a chief complaint of Left facial drooping (28 Jul 2017 14:50)      Reason For Consult: hyperglycemia     HPI:  77 year old male with PMH of DM II, HLD, HTN, CAD s/p 1 stent, CVA 1 month ago, dementia family unsure of laterality, but states normally pt awake and alert, lives alone, presenting to ED due to worsened mental status, facial droop on left and weakness of R arm/leg. Family states last seen normal last night. Brought in by EMS with FS noted outpt 120s. (27 Jul 2017 13:35)    Endocrine History : ot has hyperglycemia inpatient . HbA1C is 6.6 indicating fair control as out-patient . Also on Amiodarone and thyroid function tests are Pending           PAST MEDICAL & SURGICAL HISTORY:  Dementia  CAD (Coronary Artery Disease): s/p 4 stents (1 in &#x27;02, 1 in &#x27;04, 2 in &#x27;11)  PUD (Peptic Ulcer Disease)  CVA (Cerebral Infarction)  DM (Diabetes Mellitus)  HTN - Hypertension  Coronary Stent  History of Cholecystectomy      FAMILY HISTORY:        Social History:    MEDICATIONS  (STANDING):  losartan 100 milliGRAM(s) Oral daily  tamsulosin 0.4 milliGRAM(s) Oral at bedtime  amiodarone    Tablet 200 milliGRAM(s) Oral every 12 hours  atorvastatin 40 milliGRAM(s) Oral at bedtime  metoprolol 50 milliGRAM(s) Oral two times a day  furosemide    Tablet 40 milliGRAM(s) Oral daily  pantoprazole    Tablet 40 milliGRAM(s) Oral before breakfast  memantine 10 milliGRAM(s) Oral two times a day  insulin lispro (HumaLOG) corrective regimen sliding scale   SubCutaneous three times a day before meals  dextrose 5%. 1000 milliLiter(s) (50 mL/Hr) IV Continuous <Continuous>  dextrose 50% Injectable 12.5 Gram(s) IV Push once  dextrose 50% Injectable 25 Gram(s) IV Push once  dextrose 50% Injectable 25 Gram(s) IV Push once  enoxaparin Injectable 40 milliGRAM(s) SubCutaneous daily  aspirin  chewable 81 milliGRAM(s) Oral daily  insulin glargine Injectable (LANTUS) 8 Unit(s) SubCutaneous at bedtime  insulin lispro Injectable (HumaLOG) 4 Unit(s) SubCutaneous before breakfast  insulin lispro Injectable (HumaLOG) 4 Unit(s) SubCutaneous before lunch  insulin lispro Injectable (HumaLOG) 4 Unit(s) SubCutaneous before dinner    MEDICATIONS  (PRN):  dextrose Gel 1 Dose(s) Oral once PRN Blood Glucose LESS THAN 70 milliGRAM(s)/deciliter  glucagon  Injectable 1 milliGRAM(s) IntraMuscular once PRN Glucose LESS THAN 70 milligrams/deciliter      REVIEW OF SYSTEMS:  CONSTITUTIONAL:  as per HPI  HEENT:  Eyes:  No diplopia or blurred vision. ENT:  No earache, sore throat or runny nose.  CARDIOVASCULAR:  No pressure, squeezing, strangling, tightness, heaviness or aching about the chest, neck, axilla or epigastrium.  RESPIRATORY:  No cough, shortness of breath, PND or orthopnea.  GASTROINTESTINAL:  No nausea, vomiting or diarrhea.  GENITOURINARY:  No dysuria, frequency or urgency. No Blood in urine  MUSCULOSKELETAL:  no joint aches, no muscle pain, myalgia  SKIN:  No change in skin, hair or nails.  NEUROLOGIC:  weakness  PSYCHIATRIC:  No disorder of thought or mood.  ENDOCRINE:  No heat or cold intolerance, polyuria or polydipsia. abnormal weight gain or loss, oral thrush  HEMATOLOGICAL:  No easy bruising or bleeding.     T(C): 37.7 (08-01-17 @ 12:02), Max: 37.7 (08-01-17 @ 05:39)  HR: 104 (08-01-17 @ 12:02) (84 - 106)  BP: 114/73 (08-01-17 @ 12:02) (114/73 - 134/77)  RR: 18 (08-01-17 @ 12:02) (17 - 20)  SpO2: 96% (08-01-17 @ 12:02) (96% - 100%)  Wt(kg): --    PHYSICAL EXAM:  GENERAL: NAD, well-groomed, well-developed  HEAD:  Atraumatic, Normocephalic  EYES: EOMI, PERRLA, conjunctiva and sclera clear  ENMT: No tonsillar erythema, exudates, or enlargement; Moist mucous membranes, Good dentition, No lesions  NECK: Supple, No JVD, Normal thyroid  NERVOUS SYSTEM:  Alert & Oriented X3, Good concentration poor strength and features of CVA   CHEST/LUNG: Clear to percussion bilaterally; No rales, rhonchi, wheezing, or rubs  HEART: Regular rate and rhythm; No murmurs, rubs, or gallops  ABDOMEN: Soft, Nontender, Nondistended; Bowel sounds present  EXTREMITIES:  2+ Peripheral Pulses, No clubbing, cyanosis, or edema  LYMPH: No lymphadenopathy noted  SKIN: No rashes or lesions    CAPILLARY BLOOD GLUCOSE  273 (01 Aug 2017 08:01)  197 (31 Jul 2017 17:07)                                14.9   11.3  )-----------( 176      ( 01 Aug 2017 07:26 )             46.9       CMP:  08-01 @ 07:26  SGPT 42  Albumin 2.7   Alk Phos 131   Anion Gap 11   SGOT 59   Total Bili 0.7   BUN 34   Calcium Total 9.5   CO2 26   Chloride 110   Creatinine 0.98   eGFR if AA 87   eGFR if non AA 75   Glucose 293   Potassium 3.6   Protein 7.4   Sodium 147      Thyroid Function Tests:      Diabetes Tests:     Parathyroids:     Adrenals:       Radiology:

## 2017-08-01 NOTE — PROGRESS NOTE ADULT - SUBJECTIVE AND OBJECTIVE BOX
INTERVAL HPI/OVERNIGHT EVENTS:  Uneventful.  Patient remains lethargic.  [place].      NEUROLOGICAL EXAM (Pertinent):  Vital Signs Last 24 Hrs  T(C): 37.3 (31 Jul 2017 17:03), Max: 37.4 (31 Jul 2017 05:29)  T(F): 99.2 (31 Jul 2017 17:03), Max: 99.4 (31 Jul 2017 05:29)  HR: 88 (31 Jul 2017 17:03) (80 - 88)  BP: 133/78 (31 Jul 2017 17:03) (121/69 - 133/78)  BP(mean): --  RR: 20 (31 Jul 2017 17:03) (18 - 20)  SpO2: 98% (31 Jul 2017 17:03) (98% - 100%)    MEDICATIONS  (STANDING):  losartan 100 milliGRAM(s) Oral daily  tamsulosin 0.4 milliGRAM(s) Oral at bedtime  amiodarone    Tablet 200 milliGRAM(s) Oral every 12 hours  atorvastatin 40 milliGRAM(s) Oral at bedtime  metoprolol 50 milliGRAM(s) Oral two times a day  furosemide    Tablet 40 milliGRAM(s) Oral daily  pantoprazole    Tablet 40 milliGRAM(s) Oral before breakfast  memantine 10 milliGRAM(s) Oral two times a day  insulin lispro (HumaLOG) corrective regimen sliding scale   SubCutaneous three times a day before meals  dextrose 5%. 1000 milliLiter(s) (50 mL/Hr) IV Continuous <Continuous>  dextrose 50% Injectable 12.5 Gram(s) IV Push once  dextrose 50% Injectable 25 Gram(s) IV Push once  dextrose 50% Injectable 25 Gram(s) IV Push once  enoxaparin Injectable 40 milliGRAM(s) SubCutaneous daily  aspirin  chewable 81 milliGRAM(s) Oral daily    MEDICATIONS  (PRN):  dextrose Gel 1 Dose(s) Oral once PRN Blood Glucose LESS THAN 70 milliGRAM(s)/deciliter  glucagon  Injectable 1 milliGRAM(s) IntraMuscular once PRN Glucose LESS THAN 70 milligrams/deciliter    LABS:                        14.0   10.7  )-----------( 126      ( 30 Jul 2017 06:51 )             44.4     07-31    144  |  107  |  32<H>  ----------------------------<  279<H>  3.6   |  24  |  1.21    Ca    9.5      31 Jul 2017 09:55  Mg     2.4     07-31    Assessment & Opinion:   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  Vascular Dementia.  Failure to Thrive.  Recommendations:   DVT prophylaxis as ordered.   Continue current medications.  NGT feeding.

## 2017-08-01 NOTE — PROGRESS NOTE ADULT - SUBJECTIVE AND OBJECTIVE BOX
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 Tolerated NG feeding    MEDICATIONS  (STANDING):  losartan 100 milliGRAM(s) Oral daily  tamsulosin 0.4 milliGRAM(s) Oral at bedtime  amiodarone    Tablet 200 milliGRAM(s) Oral every 12 hours  atorvastatin 40 milliGRAM(s) Oral at bedtime  metoprolol 50 milliGRAM(s) Oral two times a day  furosemide    Tablet 40 milliGRAM(s) Oral daily  pantoprazole    Tablet 40 milliGRAM(s) Oral before breakfast  memantine 10 milliGRAM(s) Oral two times a day  insulin lispro (HumaLOG) corrective regimen sliding scale   SubCutaneous three times a day before meals  dextrose 5%. 1000 milliLiter(s) (50 mL/Hr) IV Continuous <Continuous>  dextrose 50% Injectable 12.5 Gram(s) IV Push once  dextrose 50% Injectable 25 Gram(s) IV Push once  dextrose 50% Injectable 25 Gram(s) IV Push once  enoxaparin Injectable 40 milliGRAM(s) SubCutaneous daily  aspirin  chewable 81 milliGRAM(s) Oral daily    MEDICATIONS  (PRN):  dextrose Gel 1 Dose(s) Oral once PRN Blood Glucose LESS THAN 70 milliGRAM(s)/deciliter  glucagon  Injectable 1 milliGRAM(s) IntraMuscular once PRN Glucose LESS THAN 70 milligrams/deciliter    Allergies    No Known Allergies    Intolerances      Vital Signs Last 24 Hrs  T(C): 37.7 (01 Aug 2017 05:39), Max: 37.7 (01 Aug 2017 05:39)  T(F): 99.8 (01 Aug 2017 05:39), Max: 99.8 (01 Aug 2017 05:39)  HR: 106 (01 Aug 2017 05:39) (80 - 106)  BP: 132/81 (01 Aug 2017 05:39) (123/72 - 134/77)  BP(mean): --  RR: 17 (01 Aug 2017 05:39) (17 - 20)  SpO2: 100% (01 Aug 2017 05:39) (98% - 100%)    PHYSICAL EXAM:  GENERAL: NAD, well-groomed, well-developed  HEAD:  Atraumatic, Normocephalic  EYES: EOMI, PERRLA, conjunctiva and sclera clear  ENMT: No tonsillar erythema, exudates, or enlargement; Moist mucous membranes, Good dentition, No lesions  NECK: Supple, No JVD, Normal thyroid  NERVOUS SYSTEM:  Alert & Oriented X3, Good concentration; Motor Strength 5/5 B/L upper and lower extremities; DTRs 2+ intact and symmetric  CHEST/LUNG: Clear to percussion bilaterally; No rales, rhonchi, wheezing, or rubs  HEART: Regular rate and rhythm; No murmurs, rubs, or gallops  ABDOMEN: Soft, Nontender, Nondistended; Bowel sounds present  EXTREMITIES:  2+ Peripheral Pulses, No clubbing, cyanosis, or edema  LYMPH: No lymphadenopathy noted  SKIN: No rashes or lesions      LABS:                        14.9   11.3  )-----------( 176      ( 01 Aug 2017 07:26 )             46.9     07-31    144  |  107  |  32<H>  ----------------------------<  279<H>  3.6   |  24  |  1.21    Ca    9.5      31 Jul 2017 09:55  Mg     2.4     07-31          CAPILLARY BLOOD GLUCOSE  197 (31 Jul 2017 17:07)  245 (31 Jul 2017 10:35)  267 (31 Jul 2017 07:41)        Thyroid Stimulating Hormone, Serum: 0.555 uIU/mL (07-28 @ 06:17)  Troponin I, Serum: .153 ng/mL (07-27 @ 09:34)  CPK Mass Assay %: 3.7 % (07-27 @ 09:34)    Cultures:            RADIOLOGY & ADDITIONAL TESTS:    Imaging Personally Reviewed:  [X] YES  [ ] NO    Consultant(s) Notes Reviewed:  [X] YES  [ ] NO    Care Discussed with Consultants/Other Providers [X] YES  [ ] NO

## 2017-08-02 ENCOUNTER — RESULT REVIEW (OUTPATIENT)
Age: 77
End: 2017-08-02

## 2017-08-02 LAB
ANION GAP SERPL CALC-SCNC: 11 MMOL/L — SIGNIFICANT CHANGE UP (ref 5–17)
BUN SERPL-MCNC: 46 MG/DL — HIGH (ref 7–23)
CALCIUM SERPL-MCNC: 9.9 MG/DL — SIGNIFICANT CHANGE UP (ref 8.5–10.1)
CHLORIDE SERPL-SCNC: 112 MMOL/L — HIGH (ref 96–108)
CO2 SERPL-SCNC: 27 MMOL/L — SIGNIFICANT CHANGE UP (ref 22–31)
CREAT SERPL-MCNC: 1.06 MG/DL — SIGNIFICANT CHANGE UP (ref 0.5–1.3)
GLUCOSE SERPL-MCNC: 188 MG/DL — HIGH (ref 70–99)
HCT VFR BLD CALC: 49.6 % — SIGNIFICANT CHANGE UP (ref 39–50)
HGB BLD-MCNC: 15.7 G/DL — SIGNIFICANT CHANGE UP (ref 13–17)
MAGNESIUM SERPL-MCNC: 2.7 MG/DL — HIGH (ref 1.6–2.6)
MCHC RBC-ENTMCNC: 26.6 PG — LOW (ref 27–34)
MCHC RBC-ENTMCNC: 31.7 GM/DL — LOW (ref 32–36)
MCV RBC AUTO: 83.9 FL — SIGNIFICANT CHANGE UP (ref 80–100)
PHOSPHATE SERPL-MCNC: 3.2 MG/DL — SIGNIFICANT CHANGE UP (ref 2.5–4.5)
PLATELET # BLD AUTO: 146 K/UL — LOW (ref 150–400)
POTASSIUM SERPL-MCNC: 3.8 MMOL/L — SIGNIFICANT CHANGE UP (ref 3.5–5.3)
POTASSIUM SERPL-SCNC: 3.8 MMOL/L — SIGNIFICANT CHANGE UP (ref 3.5–5.3)
RBC # BLD: 5.9 M/UL — HIGH (ref 4.2–5.8)
RBC # FLD: 15.2 % — HIGH (ref 11–15)
SODIUM SERPL-SCNC: 150 MMOL/L — HIGH (ref 135–145)
WBC # BLD: 11.6 K/UL — HIGH (ref 3.8–10.5)
WBC # FLD AUTO: 11.6 K/UL — HIGH (ref 3.8–10.5)

## 2017-08-02 PROCEDURE — 88305 TISSUE EXAM BY PATHOLOGIST: CPT | Mod: 26

## 2017-08-02 PROCEDURE — 88312 SPECIAL STAINS GROUP 1: CPT | Mod: 26

## 2017-08-02 RX ORDER — ASPIRIN/CALCIUM CARB/MAGNESIUM 324 MG
81 TABLET ORAL DAILY
Qty: 0 | Refills: 0 | Status: DISCONTINUED | OUTPATIENT
Start: 2017-08-02 | End: 2017-08-02

## 2017-08-02 RX ORDER — ASPIRIN/CALCIUM CARB/MAGNESIUM 324 MG
81 TABLET ORAL DAILY
Qty: 0 | Refills: 0 | Status: DISCONTINUED | OUTPATIENT
Start: 2017-08-02 | End: 2017-08-03

## 2017-08-02 RX ORDER — CEFAZOLIN SODIUM 1 G
1000 VIAL (EA) INJECTION EVERY 8 HOURS
Qty: 0 | Refills: 0 | Status: COMPLETED | OUTPATIENT
Start: 2017-08-02 | End: 2017-08-02

## 2017-08-02 RX ADMIN — ATORVASTATIN CALCIUM 40 MILLIGRAM(S): 80 TABLET, FILM COATED ORAL at 22:28

## 2017-08-02 RX ADMIN — LOSARTAN POTASSIUM 100 MILLIGRAM(S): 100 TABLET, FILM COATED ORAL at 06:04

## 2017-08-02 RX ADMIN — Medication 100 MILLIGRAM(S): at 13:42

## 2017-08-02 RX ADMIN — Medication 4: at 12:10

## 2017-08-02 RX ADMIN — Medication 100 MILLIGRAM(S): at 22:19

## 2017-08-02 RX ADMIN — Medication 650 MILLIGRAM(S): at 17:35

## 2017-08-02 RX ADMIN — Medication 40 MILLIGRAM(S): at 06:04

## 2017-08-02 RX ADMIN — Medication 4 UNIT(S): at 17:30

## 2017-08-02 RX ADMIN — Medication 50 MILLIGRAM(S): at 17:31

## 2017-08-02 RX ADMIN — Medication 2: at 08:07

## 2017-08-02 RX ADMIN — AMIODARONE HYDROCHLORIDE 200 MILLIGRAM(S): 400 TABLET ORAL at 06:04

## 2017-08-02 RX ADMIN — Medication 81 MILLIGRAM(S): at 17:34

## 2017-08-02 RX ADMIN — Medication 4 UNIT(S): at 12:11

## 2017-08-02 RX ADMIN — Medication 2: at 17:30

## 2017-08-02 RX ADMIN — INSULIN GLARGINE 8 UNIT(S): 100 INJECTION, SOLUTION SUBCUTANEOUS at 22:28

## 2017-08-02 RX ADMIN — MEMANTINE HYDROCHLORIDE 10 MILLIGRAM(S): 10 TABLET ORAL at 17:31

## 2017-08-02 RX ADMIN — AMIODARONE HYDROCHLORIDE 200 MILLIGRAM(S): 400 TABLET ORAL at 17:31

## 2017-08-02 RX ADMIN — Medication 50 MILLIGRAM(S): at 06:04

## 2017-08-02 RX ADMIN — MEMANTINE HYDROCHLORIDE 10 MILLIGRAM(S): 10 TABLET ORAL at 06:04

## 2017-08-02 NOTE — OCCUPATIONAL THERAPY INITIAL EVALUATION ADULT - LEVEL OF CONSCIOUSNESS, OT EVAL
lethargic/somnolent/Pt is functioning at level 1 on the [place] Cognitive Continuum.. Pt. has difficulty initiating attention to purposeful tasks. The Pt.’s behavior is purposeless, reflexive, inconsistent, and dependent in all functional areas. Pt. may show some visual tracking and is usually not vocal.

## 2017-08-02 NOTE — GOALS OF CARE CONVERSATION - PERSONAL ADVANCE DIRECTIVE - WHAT MATTERS MOST
Jaziel the son & his wife Patrizia said at this time they are not ready yet to make pt DNR/DNI they want to wait the few days to see how pt does as per Dr Trent.

## 2017-08-02 NOTE — OCCUPATIONAL THERAPY INITIAL EVALUATION ADULT - PRECAUTIONS/LIMITATIONS, REHAB EVAL
diminished reaction time due to age related changes ; pt   has no sense of equilibrium; spatial relation, depth  perception, figure  ground , spatial relation , kinesthesia , proprioception    and tendon reflexes in RUE/LE are  impaired/cardiac precautions/fall precautions cardiac precautions/diminished reaction time due to age related changes ; pt   has no sense of equilibrium;  depth  perception, figure  ground , spatial relation , kinesthesia , proprioception    and tendon reflexes in RUE/LE are  impaired/fall precautions

## 2017-08-02 NOTE — PROGRESS NOTE ADULT - SUBJECTIVE AND OBJECTIVE BOX
INTERVAL HPI/OVERNIGHT EVENTS:  Pt on PEG.  Still non-responsive.    Pt remains mute; eyes closed shut, but exhibits purposeful left upper extremity movements.  Right dense plegia still present.  No new neuro change with examination.  Family visiting, spoke to them at length.  Patient just started on PEG nutrition and should be further observed for any neurologic improvement.  If his mental state remains as is, family was informed of possible DNR and DNI status.      NEUROLOGICAL EXAM (Pertinent):  Vital Signs Last 24 Hrs  T(C): 38 (02 Aug 2017 17:10), Max: 38 (02 Aug 2017 17:10)  T(F): 100.4 (02 Aug 2017 17:10), Max: 100.4 (02 Aug 2017 17:10)  HR: 89 (02 Aug 2017 17:10) (67 - 98)  BP: 94/61 (02 Aug 2017 17:10) (94/61 - 152/72)  BP(mean): --  RR: 17 (02 Aug 2017 17:10) (16 - 22)  SpO2: 96% (02 Aug 2017 17:10) (94% - 100%)    MEDICATIONS  (STANDING):  losartan 100 milliGRAM(s) Oral daily  tamsulosin 0.4 milliGRAM(s) Oral at bedtime  amiodarone    Tablet 200 milliGRAM(s) Oral every 12 hours  atorvastatin 40 milliGRAM(s) Oral at bedtime  metoprolol 50 milliGRAM(s) Oral two times a day  furosemide    Tablet 40 milliGRAM(s) Oral daily  pantoprazole    Tablet 40 milliGRAM(s) Oral before breakfast  memantine 10 milliGRAM(s) Oral two times a day  insulin lispro (HumaLOG) corrective regimen sliding scale   SubCutaneous three times a day before meals  dextrose 5%. 1000 milliLiter(s) (50 mL/Hr) IV Continuous <Continuous>  dextrose 50% Injectable 12.5 Gram(s) IV Push once  dextrose 50% Injectable 25 Gram(s) IV Push once  dextrose 50% Injectable 25 Gram(s) IV Push once  insulin glargine Injectable (LANTUS) 8 Unit(s) SubCutaneous at bedtime  insulin lispro Injectable (HumaLOG) 4 Unit(s) SubCutaneous before breakfast  insulin lispro Injectable (HumaLOG) 4 Unit(s) SubCutaneous before lunch  insulin lispro Injectable (HumaLOG) 4 Unit(s) SubCutaneous before dinner  ceFAZolin   IVPB 1000 milliGRAM(s) IV Intermittent every 8 hours  aspirin enteric coated 81 milliGRAM(s) Oral daily    MEDICATIONS  (PRN):  dextrose Gel 1 Dose(s) Oral once PRN Blood Glucose LESS THAN 70 milliGRAM(s)/deciliter  glucagon  Injectable 1 milliGRAM(s) IntraMuscular once PRN Glucose LESS THAN 70 milligrams/deciliter  acetaminophen    Suspension 650 milliGRAM(s) Oral every 6 hours PRN For Temp greater than 38 C (100.4 F)    LABS:                        15.7   11.6  )-----------( 146      ( 02 Aug 2017 06:38 )             49.6     08-02    150<H>  |  112<H>  |  46<H>  ----------------------------<  188<H>  3.8   |  27  |  1.06    Ca    9.9      02 Aug 2017 06:38  Phos  3.2     08-02  Mg     2.7     08-02    TPro  7.4  /  Alb  2.7<L>  /  TBili  0.7  /  DBili  x   /  AST  59<H>  /  ALT  42  /  AlkPhos  131<H>  08-01    Assessment & Opinion:   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  MRI:  Acute left CHARLENE and MCA territory distribution infarcts as above. Mild hemorrhagic conversion left frontal lobe MCA territory infarct.  Stable chronic right PCA distribution infarct and moderate chronic microvascular change. Vascular Dementia.  Failure to Thrive.  Recommendations:   DVT prophylaxis as ordered.   Continue current medications.

## 2017-08-02 NOTE — OCCUPATIONAL THERAPY INITIAL EVALUATION ADULT - MANUAL MUSCLE TESTING RESULTS, REHAB EVAL
grossly assessed due to/due to pt's inability to follow commands  for MMT, LUE/LE fair  +. RUE/LE zero

## 2017-08-02 NOTE — PROGRESS NOTE ADULT - PROBLEM SELECTOR PLAN 1
Continue with the same regimen while inpatient   may need to adjust the dose of Lantus or prandial lispro to achieve target range blood glucose

## 2017-08-02 NOTE — GOALS OF CARE CONVERSATION - PERSONAL ADVANCE DIRECTIVE - TREATMENT GUIDELINE COMMENT
Son Jaziel & his wife want to wait for a few more days before making any decisions about Advance Directives.

## 2017-08-02 NOTE — PROGRESS NOTE ADULT - SUBJECTIVE AND OBJECTIVE BOX
Patient is a 77y old  Male who presents with a chief complaint of Left facial drooping (28 Jul 2017 14:50)    MEDICAL PROBLEMS:  CEREBRAL INFARCTION DUE TO THROMBOSIS OF LEFT MIDDLE CEREBRA  GENERAL WEAKNESS  Dementia  CAD (Coronary Artery Disease)  PUD (Peptic Ulcer Disease)  CVA (Cerebral Infarction)  DM (Diabetes Mellitus)  HTN - Hypertension  Cerebral infarction due to thrombosis of left middle cerebral artery  Abnormal thyroid blood test  Type 2 diabetes mellitus without complication, unspecified long term insulin use status      INTERVAL HPI/OVERNIGHT EVENTS: Acute left MCA infarct on MRI of brain. Lethargic, dysphagia    MEDICATIONS  (STANDING):  losartan 100 milliGRAM(s) Oral daily  tamsulosin 0.4 milliGRAM(s) Oral at bedtime  amiodarone    Tablet 200 milliGRAM(s) Oral every 12 hours  atorvastatin 40 milliGRAM(s) Oral at bedtime  metoprolol 50 milliGRAM(s) Oral two times a day  furosemide    Tablet 40 milliGRAM(s) Oral daily  pantoprazole    Tablet 40 milliGRAM(s) Oral before breakfast  memantine 10 milliGRAM(s) Oral two times a day  insulin lispro (HumaLOG) corrective regimen sliding scale   SubCutaneous three times a day before meals  dextrose 5%. 1000 milliLiter(s) (50 mL/Hr) IV Continuous <Continuous>  dextrose 50% Injectable 12.5 Gram(s) IV Push once  dextrose 50% Injectable 25 Gram(s) IV Push once  dextrose 50% Injectable 25 Gram(s) IV Push once  insulin glargine Injectable (LANTUS) 8 Unit(s) SubCutaneous at bedtime  insulin lispro Injectable (HumaLOG) 4 Unit(s) SubCutaneous before breakfast  insulin lispro Injectable (HumaLOG) 4 Unit(s) SubCutaneous before lunch  insulin lispro Injectable (HumaLOG) 4 Unit(s) SubCutaneous before dinner  ceFAZolin   IVPB 1000 milliGRAM(s) IV Intermittent every 8 hours    MEDICATIONS  (PRN):  dextrose Gel 1 Dose(s) Oral once PRN Blood Glucose LESS THAN 70 milliGRAM(s)/deciliter  glucagon  Injectable 1 milliGRAM(s) IntraMuscular once PRN Glucose LESS THAN 70 milligrams/deciliter  acetaminophen    Suspension 650 milliGRAM(s) Oral every 6 hours PRN For Temp greater than 38 C (100.4 F)    Allergies    No Known Allergies    Intolerances      Vital Signs Last 24 Hrs  T(C): 36.7 (02 Aug 2017 05:42), Max: 38.4 (01 Aug 2017 16:31)  T(F): 98.1 (02 Aug 2017 05:42), Max: 101.1 (01 Aug 2017 16:31)  HR: 75 (02 Aug 2017 05:42) (67 - 104)  BP: 129/75 (02 Aug 2017 05:42) (111/70 - 129/75)  BP(mean): --  RR: 18 (02 Aug 2017 05:42) (17 - 18)  SpO2: 96% (02 Aug 2017 05:42) (94% - 96%)    PHYSICAL EXAM:  GENERAL: NAD, well-groomed, well-developed  HEAD:  Atraumatic, Normocephalic  EYES: EOMI, PERRLA, conjunctiva and sclera clear  ENMT: No tonsillar erythema, exudates, or enlargement; Moist mucous membranes, Good dentition, No lesions  NECK: Supple, No JVD, Normal thyroid  NERVOUS SYSTEM:  Alert & Oriented X3, Good concentration; Motor Strength 5/5 B/L upper and lower extremities; DTRs 2+ intact and symmetric  CHEST/LUNG: Clear to percussion bilaterally; No rales, rhonchi, wheezing, or rubs  HEART: Regular rate and rhythm; No murmurs, rubs, or gallops  ABDOMEN: Soft, Nontender, Nondistended; Bowel sounds present  EXTREMITIES:  2+ Peripheral Pulses, No clubbing, cyanosis, or edema  LYMPH: No lymphadenopathy noted  SKIN: No rashes or lesions      LABS:                        15.7   11.6  )-----------( 146      ( 02 Aug 2017 06:38 )             49.6     08-02    150<H>  |  112<H>  |  46<H>  ----------------------------<  188<H>  3.8   |  27  |  1.06    Ca    9.9      02 Aug 2017 06:38  Phos  3.2     08-02  Mg     2.7     08-02    TPro  7.4  /  Alb  2.7<L>  /  TBili  0.7  /  DBili  x   /  AST  59<H>  /  ALT  42  /  AlkPhos  131<H>  08-01        CAPILLARY BLOOD GLUCOSE  181 (01 Aug 2017 22:29)  228 (01 Aug 2017 16:31)  259 (01 Aug 2017 12:45)        Thyroid Stimulating Hormone, Serum: 0.555 uIU/mL (07-28 @ 06:17)  Troponin I, Serum: .153 ng/mL (07-27 @ 09:34)  CPK Mass Assay %: 3.7 % (07-27 @ 09:34)    Cultures:            RADIOLOGY & ADDITIONAL TESTS:    Imaging Personally Reviewed:  [X] YES  [ ] NO    Consultant(s) Notes Reviewed:  [X] YES  [ ] NO    Care Discussed with Consultants/Other Providers [X] YES  [ ] NO

## 2017-08-02 NOTE — OCCUPATIONAL THERAPY INITIAL EVALUATION ADULT - IMPAIRMENTS CONTRIBUTING IMPAIRED BED MOBILITY, REHAB EVAL
cognition/impaired balance/decreased sensation/decreased ROM/impaired postural control/decreased strength/narrow base of support/impaired motor control/abnormal muscle tone/impaired coordination/scissoring/impaired sensory feedback

## 2017-08-02 NOTE — OCCUPATIONAL THERAPY INITIAL EVALUATION ADULT - SOCIAL CONCERNS
Complex psychosocial needs/coping issues/Pt's  voiced concerns about pt  significant  functional decline and will be unable   to care for him at  home. Pt's  voiced concerns about pt's  significant  functional decline and will be unable   to care for him at  home./Complex psychosocial needs/coping issues

## 2017-08-02 NOTE — OCCUPATIONAL THERAPY INITIAL EVALUATION ADULT - SIT-TO-STAND BALANCE
Pt is unable to stand due to poor trunl control and right  hemiplegias Pt is unable to stand due to poor trunk control and right  hemiplegias

## 2017-08-02 NOTE — OCCUPATIONAL THERAPY INITIAL EVALUATION ADULT - BALANCE DISTURBANCE, IDENTIFIED IMPAIRMENT CONTRIBUTE, REHAB EVAL
impaired coordination/decreased strength/decreased sensation/abnormal muscle tone/impaired postural control/decreased ROM/impaired sensory feedback/impaired motor control

## 2017-08-02 NOTE — PROGRESS NOTE ADULT - SUBJECTIVE AND OBJECTIVE BOX
Assessment:  New CVA  Chronic systolic CHF  Mild troponin leak in a setting of CVA, not likely AMI  Continue current CV management as tolerated.  Temp 100.4

## 2017-08-02 NOTE — OCCUPATIONAL THERAPY INITIAL EVALUATION ADULT - COORDINATION ASSESSED, REHAB EVAL
finger to nose/heel to shin/impaired in RUELE heel to shin/finger to nose/impaired in RUE/LE impaired in RUE/LE/heel to shin/finger to nose

## 2017-08-02 NOTE — OCCUPATIONAL THERAPY INITIAL EVALUATION ADULT - PLANNED THERAPY INTERVENTIONS, OT EVAL
bed mobility training/fine motor coordination training/parent/caregiver training.../cognitive, visual perceptual/joint mobilization/strengthening/energy conservation techniques  and visual tracking/ADL retraining/balance training/IADL retraining/transfer training/motor coordination training/massage/stretching/neuromuscular re-education/ROM

## 2017-08-02 NOTE — OCCUPATIONAL THERAPY INITIAL EVALUATION ADULT - ADDITIONAL COMMENTS
Prior to admission, pt was functioning in his  roles, self sufficient  for some area of BADL & ambulating independently with a straight cane; pt had a HHA 3 hours for 3 days  per week; presently, pt  requires total assistance for all levels of care . The scale below depicts a picture of the pt's current level of functioning. Barthel Index: Feeding Score____0__, Bathing Score___0___, Grooming Score___0__, Dressing Score___0__, Bowel Score__0___, Bladder Score_0_____, Toilet Score____0_, Transfer Score__0____, Mobility Score__0___, Stairs Score___0__, Total Score_0____.

## 2017-08-02 NOTE — OCCUPATIONAL THERAPY INITIAL EVALUATION ADULT - IMPAIRED TRANSFERS: BED/CHAIR, REHAB EVAL
impaired motor control/impaired coordination/decreased strength/narrow base of support/ataxic/decreased sensation/scissoring/abnormal muscle tone/cognition/impaired sensory feedback/impaired postural control/decreased flexibility/decreased ROM/impaired balance

## 2017-08-02 NOTE — OCCUPATIONAL THERAPY INITIAL EVALUATION ADULT - LIVES WITH, PROFILE
family  in a private house  with steps to negotiate ; pt  has a commode  shower chair and  straight cane; pt 's bathroom  has a tub  and shower combination

## 2017-08-02 NOTE — PROGRESS NOTE ADULT - SUBJECTIVE AND OBJECTIVE BOX
Patient is a 77y old  Male who presents with a chief complaint of Left facial drooping (28 Jul 2017 14:50)      Interval History: finger sticks are with slight fluctuations   on Lantus 8 units and prandial lispro 4  and Lispro sliding scale coverage with meals   Nutrition fair      MEDICATIONS  (STANDING):  losartan 100 milliGRAM(s) Oral daily  tamsulosin 0.4 milliGRAM(s) Oral at bedtime  amiodarone    Tablet 200 milliGRAM(s) Oral every 12 hours  atorvastatin 40 milliGRAM(s) Oral at bedtime  metoprolol 50 milliGRAM(s) Oral two times a day  furosemide    Tablet 40 milliGRAM(s) Oral daily  pantoprazole    Tablet 40 milliGRAM(s) Oral before breakfast  memantine 10 milliGRAM(s) Oral two times a day  insulin lispro (HumaLOG) corrective regimen sliding scale   SubCutaneous three times a day before meals  dextrose 5%. 1000 milliLiter(s) (50 mL/Hr) IV Continuous <Continuous>  dextrose 50% Injectable 12.5 Gram(s) IV Push once  dextrose 50% Injectable 25 Gram(s) IV Push once  dextrose 50% Injectable 25 Gram(s) IV Push once  insulin glargine Injectable (LANTUS) 8 Unit(s) SubCutaneous at bedtime  insulin lispro Injectable (HumaLOG) 4 Unit(s) SubCutaneous before breakfast  insulin lispro Injectable (HumaLOG) 4 Unit(s) SubCutaneous before lunch  insulin lispro Injectable (HumaLOG) 4 Unit(s) SubCutaneous before dinner  ceFAZolin   IVPB 1000 milliGRAM(s) IV Intermittent every 8 hours  aspirin enteric coated 81 milliGRAM(s) Oral daily    MEDICATIONS  (PRN):  dextrose Gel 1 Dose(s) Oral once PRN Blood Glucose LESS THAN 70 milliGRAM(s)/deciliter  glucagon  Injectable 1 milliGRAM(s) IntraMuscular once PRN Glucose LESS THAN 70 milligrams/deciliter  acetaminophen    Suspension 650 milliGRAM(s) Oral every 6 hours PRN For Temp greater than 38 C (100.4 F)      Allergies    No Known Allergies    Intolerances        REVIEW OF SYSTEMS:  CONSTITUTIONAL:   EYES: No eye pain, visual disturbances, or discharge  ENMT:  No difficulty hearing, tinnitus, vertigo; No sinus or throat pain  NECK: No pain or stiffness  RESPIRATORY: No cough, wheezing, chills or hemoptysis; No shortness of breath  CARDIOVASCULAR: No chest pain, palpitations, dizziness, or leg swelling  GASTROINTESTINAL: No abdominal or epigastric pain. No nausea, vomiting, or hematemesis; No diarrhea or constipation. No melena or hematochezia.  GENITOURINARY: No dysuria, frequency, hematuria, or incontinence  NEUROLOGICAL: No headaches, memory loss, loss of strength, numbness, or tremors  SKIN: No itching, burning, rashes, or lesions   LYMPH NODES: No enlarged glands  ENDOCRINE: No heat or cold intolerance; No hair loss  MUSCULOSKELETAL: No joint pain or swelling; No muscle, back, or extremity pain  PSYCHIATRIC: No depression, anxiety, mood swings, or difficulty sleeping  HEME/LYMPH: No easy bruising, or bleeding gums  ALLERY AND IMMUNOLOGIC: No hives or eczema    Vital Signs Last 24 Hrs  T(C): 38 (02 Aug 2017 17:10), Max: 38 (02 Aug 2017 17:10)  T(F): 100.4 (02 Aug 2017 17:10), Max: 100.4 (02 Aug 2017 17:10)  HR: 89 (02 Aug 2017 17:10) (67 - 98)  BP: 94/61 (02 Aug 2017 17:10) (94/61 - 152/72)  BP(mean): --  RR: 17 (02 Aug 2017 17:10) (16 - 22)  SpO2: 96% (02 Aug 2017 17:10) (94% - 100%)    PHYSICAL EXAM:  GENERAL: rundown   fluctuating mentation   HEAD:  Atraumatic, Normocephalic  EYES: EOMI, PERRLA, conjunctiva and sclera clear  ENMT: No tonsillar erythema, exudates, or enlargement; Moist mucous membranes, Good dentition, No lesions  NECK: Supple, No JVD, Normal thyroid  NERVOUS SYSTEM:  Alert & Oriented X3, Good concentration; Motor Strength 5/5 B/L upper and lower extremities; DTRs 2+ intact and symmetric  CHEST/LUNG: Clear to percussion bilaterally; No rales, rhonchi, wheezing, or rubs  HEART: Regular rate and rhythm; No murmurs, rubs, or gallops  ABDOMEN: Soft, Nontender, Nondistended; Bowel sounds present  EXTREMITIES:  2+ Peripheral Pulses, No clubbing, cyanosis, or edema  SKIN: No rashes or lesions    LABS:        CAPILLARY BLOOD GLUCOSE  233 (02 Aug 2017 12:10)  184 (02 Aug 2017 08:04)  181 (01 Aug 2017 22:29)        Lipid panel:           Thyroid:  Diabetes Tests:  Parathyroid Panel:  Adrenals:  RADIOLOGY & ADDITIONAL TESTS:    Imaging Personally Reviewed:  [ ] YES  [ ] NO    Consultant(s) Notes Reviewed:  [ ] YES  [ ] NO    Care Discussed with Consultants/Other Providers [ ] YES  [ ] NO

## 2017-08-02 NOTE — OCCUPATIONAL THERAPY INITIAL EVALUATION ADULT - RANGE OF MOTION EXAMINATION, LOWER EXTREMITY
Left LE Active ROM was WFL (within functional limits)/Left LE Passive ROM was WFL (w/i functional limits)/RLE is non functional  , no act movements are noted/Right LE Passive ROM was WNL (within normal limits)

## 2017-08-02 NOTE — OCCUPATIONAL THERAPY INITIAL EVALUATION ADULT - TRANSFER SAFETY CONCERNS NOTED: BED/CHAIR, REHAB EVAL
stand pivot/losing balance/decreased safety awareness/decreased sequencing ability/decreased proprioception

## 2017-08-02 NOTE — OCCUPATIONAL THERAPY INITIAL EVALUATION ADULT - RANGE OF MOTION EXAMINATION, UPPER EXTREMITY
Right UE Passive ROM was WNL (within normal limits)/Left UE Active ROM was WFL (within functional limits)/RUE is non functional  , no act movements are noted/Left UE Passive ROM was WFL  (within functional limits)

## 2017-08-02 NOTE — GOALS OF CARE CONVERSATION - PERSONAL ADVANCE DIRECTIVE - CONVERSATION DETAILS
Asked to see pt to discuss Advance Care Planning, Advance Directives, & discuss GOC.  Unable to speak with pt asleep at time of visit.  Met with pts son Jaziel, & his wife Patrizia Contreras.  They had just finished speaking with Neurologist Dr Trent.  They informed  me that he said to wait 2 more days before making any decisions to see how pt does after getting Peg today.  Educated them on MOLST & Advance Directives & son informed me he already know about that because his mother passed away about 7 years ago & they went through the seme thing.

## 2017-08-02 NOTE — OCCUPATIONAL THERAPY INITIAL EVALUATION ADULT - GENERAL OBSERVATIONS, REHAB EVAL
Pt was  encountered supine in bed, aphasic, non verbal , unable  followed commands.  Decreased alertness noted ; pt responded to tactile cues.  Decreased labial seal noted. Pt has RUE/LE weakness with impaired  muscle tone, coordination,  balance due to s/p CVA

## 2017-08-02 NOTE — OCCUPATIONAL THERAPY INITIAL EVALUATION ADULT - PERTINENT HX OF CURRENT PROBLEM, REHAB EVAL
Pt presented to ER due to acute onset of neurological deficit. Pt is diagnosed with CVA with  right  hemiplegia . MRI on 8/1/17_ results confirm  acute left  CHARLENE  and MCA  territory infarcts, left frontal lobe infarcts

## 2017-08-03 LAB
ANION GAP SERPL CALC-SCNC: 10 MMOL/L — SIGNIFICANT CHANGE UP (ref 5–17)
BASOPHILS # BLD AUTO: 0.1 K/UL — SIGNIFICANT CHANGE UP (ref 0–0.2)
BASOPHILS NFR BLD AUTO: 0.7 % — SIGNIFICANT CHANGE UP (ref 0–2)
BUN SERPL-MCNC: 58 MG/DL — HIGH (ref 7–23)
CALCIUM SERPL-MCNC: 9.5 MG/DL — SIGNIFICANT CHANGE UP (ref 8.5–10.1)
CHLORIDE SERPL-SCNC: 111 MMOL/L — HIGH (ref 96–108)
CO2 SERPL-SCNC: 29 MMOL/L — SIGNIFICANT CHANGE UP (ref 22–31)
CREAT SERPL-MCNC: 1.21 MG/DL — SIGNIFICANT CHANGE UP (ref 0.5–1.3)
EOSINOPHIL # BLD AUTO: 0.3 K/UL — SIGNIFICANT CHANGE UP (ref 0–0.5)
EOSINOPHIL NFR BLD AUTO: 3.1 % — SIGNIFICANT CHANGE UP (ref 0–6)
GLUCOSE SERPL-MCNC: 214 MG/DL — HIGH (ref 70–99)
HCT VFR BLD CALC: 50.2 % — HIGH (ref 39–50)
HGB BLD-MCNC: 15.6 G/DL — SIGNIFICANT CHANGE UP (ref 13–17)
LYMPHOCYTES # BLD AUTO: 1.6 K/UL — SIGNIFICANT CHANGE UP (ref 1–3.3)
LYMPHOCYTES # BLD AUTO: 15.7 % — SIGNIFICANT CHANGE UP (ref 13–44)
MCHC RBC-ENTMCNC: 26.1 PG — LOW (ref 27–34)
MCHC RBC-ENTMCNC: 31.1 GM/DL — LOW (ref 32–36)
MCV RBC AUTO: 84 FL — SIGNIFICANT CHANGE UP (ref 80–100)
MONOCYTES # BLD AUTO: 0.8 K/UL — SIGNIFICANT CHANGE UP (ref 0–0.9)
MONOCYTES NFR BLD AUTO: 8.5 % — SIGNIFICANT CHANGE UP (ref 2–14)
NEUTROPHILS # BLD AUTO: 7.2 K/UL — SIGNIFICANT CHANGE UP (ref 1.8–7.4)
NEUTROPHILS NFR BLD AUTO: 71.9 % — SIGNIFICANT CHANGE UP (ref 43–77)
PLATELET # BLD AUTO: 169 K/UL — SIGNIFICANT CHANGE UP (ref 150–400)
POTASSIUM SERPL-MCNC: 3.5 MMOL/L — SIGNIFICANT CHANGE UP (ref 3.5–5.3)
POTASSIUM SERPL-SCNC: 3.5 MMOL/L — SIGNIFICANT CHANGE UP (ref 3.5–5.3)
RBC # BLD: 5.97 M/UL — HIGH (ref 4.2–5.8)
RBC # FLD: 15.5 % — HIGH (ref 11–15)
SODIUM SERPL-SCNC: 150 MMOL/L — HIGH (ref 135–145)
SURGICAL PATHOLOGY FINAL REPORT - CH: SIGNIFICANT CHANGE UP
WBC # BLD: 10 K/UL — SIGNIFICANT CHANGE UP (ref 3.8–10.5)
WBC # FLD AUTO: 10 K/UL — SIGNIFICANT CHANGE UP (ref 3.8–10.5)

## 2017-08-03 RX ORDER — INSULIN GLARGINE 100 [IU]/ML
8 INJECTION, SOLUTION SUBCUTANEOUS
Qty: 1 | Refills: 0 | OUTPATIENT
Start: 2017-08-03 | End: 2017-09-02

## 2017-08-03 RX ORDER — INSULIN LISPRO 100/ML
4 VIAL (ML) SUBCUTANEOUS
Qty: 0 | Refills: 0 | COMMUNITY
Start: 2017-08-03

## 2017-08-03 RX ORDER — ASPIRIN/CALCIUM CARB/MAGNESIUM 324 MG
81 TABLET ORAL DAILY
Qty: 0 | Refills: 0 | Status: DISCONTINUED | OUTPATIENT
Start: 2017-08-03 | End: 2017-08-04

## 2017-08-03 RX ORDER — ASPIRIN/CALCIUM CARB/MAGNESIUM 324 MG
1 TABLET ORAL
Qty: 0 | Refills: 0 | COMMUNITY
Start: 2017-08-03

## 2017-08-03 RX ORDER — PANTOPRAZOLE SODIUM 20 MG/1
40 TABLET, DELAYED RELEASE ORAL DAILY
Qty: 0 | Refills: 0 | Status: DISCONTINUED | OUTPATIENT
Start: 2017-08-03 | End: 2017-08-04

## 2017-08-03 RX ORDER — INSULIN LISPRO 100/ML
0 VIAL (ML) SUBCUTANEOUS
Qty: 0 | Refills: 0 | COMMUNITY
Start: 2017-08-03

## 2017-08-03 RX ORDER — ASPIRIN/CALCIUM CARB/MAGNESIUM 324 MG
81 TABLET ORAL DAILY
Qty: 0 | Refills: 0 | Status: DISCONTINUED | OUTPATIENT
Start: 2017-08-03 | End: 2017-08-03

## 2017-08-03 RX ORDER — AMIODARONE HYDROCHLORIDE 400 MG/1
200 TABLET ORAL DAILY
Qty: 0 | Refills: 0 | Status: DISCONTINUED | OUTPATIENT
Start: 2017-08-03 | End: 2017-08-04

## 2017-08-03 RX ADMIN — Medication 40 MILLIGRAM(S): at 08:23

## 2017-08-03 RX ADMIN — MEMANTINE HYDROCHLORIDE 10 MILLIGRAM(S): 10 TABLET ORAL at 17:37

## 2017-08-03 RX ADMIN — ATORVASTATIN CALCIUM 40 MILLIGRAM(S): 80 TABLET, FILM COATED ORAL at 21:47

## 2017-08-03 RX ADMIN — Medication 4 UNIT(S): at 17:02

## 2017-08-03 RX ADMIN — Medication 4: at 08:23

## 2017-08-03 RX ADMIN — Medication 50 MILLIGRAM(S): at 06:04

## 2017-08-03 RX ADMIN — Medication 4 UNIT(S): at 11:32

## 2017-08-03 RX ADMIN — PANTOPRAZOLE SODIUM 40 MILLIGRAM(S): 20 TABLET, DELAYED RELEASE ORAL at 13:55

## 2017-08-03 RX ADMIN — AMIODARONE HYDROCHLORIDE 200 MILLIGRAM(S): 400 TABLET ORAL at 06:04

## 2017-08-03 RX ADMIN — Medication 2: at 17:02

## 2017-08-03 RX ADMIN — PANTOPRAZOLE SODIUM 40 MILLIGRAM(S): 20 TABLET, DELAYED RELEASE ORAL at 08:28

## 2017-08-03 RX ADMIN — INSULIN GLARGINE 8 UNIT(S): 100 INJECTION, SOLUTION SUBCUTANEOUS at 21:47

## 2017-08-03 RX ADMIN — Medication 2: at 11:32

## 2017-08-03 RX ADMIN — MEMANTINE HYDROCHLORIDE 10 MILLIGRAM(S): 10 TABLET ORAL at 06:04

## 2017-08-03 NOTE — PROGRESS NOTE ADULT - PROBLEM SELECTOR PLAN 1
Continue with the same regimen while inpatient   discontinue prandial lispro   patient can be discharged on the same regimen   while inpatient better to have FS< 150 and preferably in 120-140 range

## 2017-08-03 NOTE — PROGRESS NOTE ADULT - SUBJECTIVE AND OBJECTIVE BOX
INTERVAL HPI/OVERNIGHT EVENTS:  Subjective Complaints:  With PEG feeding.  Appears to open his eyes but still mute.    RECENT RADIOLOGY & ADDITIONAL TESTS:    NEUROLOGICAL EXAM (Pertinent):  Vital Signs Last 24 Hrs  T(C): 37.3 (03 Aug 2017 17:05), Max: 37.3 (03 Aug 2017 17:05)  T(F): 99.2 (03 Aug 2017 17:05), Max: 99.2 (03 Aug 2017 17:05)  HR: 87 (03 Aug 2017 17:05) (62 - 94)  BP: 90/54 (03 Aug 2017 17:05) (90/54 - 118/70)  BP(mean): --  RR: 17 (03 Aug 2017 17:05) (17 - 18)  SpO2: 95% (03 Aug 2017 17:05) (95% - 100%)    Mental State:    Cranial Nerves:    Motor and Sensory:      Reflexes:      Cerebellar Testing:    Neurovascular:     MEDICATIONS  (STANDING):  tamsulosin 0.4 milliGRAM(s) Oral at bedtime  atorvastatin 40 milliGRAM(s) Oral at bedtime  memantine 10 milliGRAM(s) Oral two times a day  insulin lispro (HumaLOG) corrective regimen sliding scale   SubCutaneous three times a day before meals  dextrose 5%. 1000 milliLiter(s) (50 mL/Hr) IV Continuous <Continuous>  dextrose 50% Injectable 12.5 Gram(s) IV Push once  dextrose 50% Injectable 25 Gram(s) IV Push once  dextrose 50% Injectable 25 Gram(s) IV Push once  insulin glargine Injectable (LANTUS) 8 Unit(s) SubCutaneous at bedtime  amiodarone    Tablet 200 milliGRAM(s) Oral daily  pantoprazole   Suspension 40 milliGRAM(s) Oral daily  aspirin  chewable 81 milliGRAM(s) Oral daily    MEDICATIONS  (PRN):  dextrose Gel 1 Dose(s) Oral once PRN Blood Glucose LESS THAN 70 milliGRAM(s)/deciliter  glucagon  Injectable 1 milliGRAM(s) IntraMuscular once PRN Glucose LESS THAN 70 milligrams/deciliter  acetaminophen    Suspension 650 milliGRAM(s) Oral every 6 hours PRN For Temp greater than 38 C (100.4 F)    LABS:                        15.6   10.0  )-----------( 169      ( 03 Aug 2017 07:40 )             50.2     08-03    150<H>  |  111<H>  |  58<H>  ----------------------------<  214<H>  3.5   |  29  |  1.21    Ca    9.5      03 Aug 2017 07:40  Phos  3.2     08-02  Mg     2.7     08-02            ASSESSMENT & OPINION:  RECOMMENDATIONS: INTERVAL HPI/OVERNIGHT EVENTS:  Subjective Complaints:  With PEG feeding.  Appears to open his eyes but still mute.    RECENT RADIOLOGY & ADDITIONAL TESTS:    NEUROLOGICAL EXAM (Pertinent):  Vital Signs Last 24 Hrs  T(C): 37.3 (03 Aug 2017 17:05), Max: 37.3 (03 Aug 2017 17:05)  T(F): 99.2 (03 Aug 2017 17:05), Max: 99.2 (03 Aug 2017 17:05)  HR: 87 (03 Aug 2017 17:05) (62 - 94)  BP: 90/54 (03 Aug 2017 17:05) (90/54 - 118/70)  BP(mean): --  RR: 17 (03 Aug 2017 17:05) (17 - 18)  SpO2: 95% (03 Aug 2017 17:05) (95% - 100%)    MEDICATIONS  (STANDING):  tamsulosin 0.4 milliGRAM(s) Oral at bedtime  atorvastatin 40 milliGRAM(s) Oral at bedtime  memantine 10 milliGRAM(s) Oral two times a day  insulin lispro (HumaLOG) corrective regimen sliding scale   SubCutaneous three times a day before meals  dextrose 5%. 1000 milliLiter(s) (50 mL/Hr) IV Continuous <Continuous>  dextrose 50% Injectable 12.5 Gram(s) IV Push once  dextrose 50% Injectable 25 Gram(s) IV Push once  dextrose 50% Injectable 25 Gram(s) IV Push once  insulin glargine Injectable (LANTUS) 8 Unit(s) SubCutaneous at bedtime  amiodarone    Tablet 200 milliGRAM(s) Oral daily  pantoprazole   Suspension 40 milliGRAM(s) Oral daily  aspirin  chewable 81 milliGRAM(s) Oral daily    MEDICATIONS  (PRN):  dextrose Gel 1 Dose(s) Oral once PRN Blood Glucose LESS THAN 70 milliGRAM(s)/deciliter  glucagon  Injectable 1 milliGRAM(s) IntraMuscular once PRN Glucose LESS THAN 70 milligrams/deciliter  acetaminophen    Suspension 650 milliGRAM(s) Oral every 6 hours PRN For Temp greater than 38 C (100.4 F)    LABS:                        15.6   10.0  )-----------( 169      ( 03 Aug 2017 07:40 )             50.2     08-03    150<H>  |  111<H>  |  58<H>  ----------------------------<  214<H>  3.5   |  29  |  1.21    Ca    9.5      03 Aug 2017 07:40  Phos  3.2     08-02  Mg     2.7     08-02    Assessment & Opinion:   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  MRI:  Acute left CHARLENE and MCA territory distribution infarcts as above. Mild hemorrhagic conversion left frontal lobe MCA territory infarct.  Stable chronic right PCA distribution infarct and moderate chronic microvascular change. Vascular Dementia.  Failure to Thrive.  Recommendations:   DVT prophylaxis as ordered.   Continue current medications. Placement.

## 2017-08-03 NOTE — PROGRESS NOTE ADULT - SUBJECTIVE AND OBJECTIVE BOX
Patient is a 77y old  Male who presents with a chief complaint of Left facial drooping (28 Jul 2017 14:50)      Interval History: finger sticks are stable   o NGT feeds and on Lispro sliding scale coverage with meals and prandial lispro and low dose Lantus     MEDICATIONS  (STANDING):  tamsulosin 0.4 milliGRAM(s) Oral at bedtime  atorvastatin 40 milliGRAM(s) Oral at bedtime  memantine 10 milliGRAM(s) Oral two times a day  insulin lispro (HumaLOG) corrective regimen sliding scale   SubCutaneous three times a day before meals  dextrose 5%. 1000 milliLiter(s) (50 mL/Hr) IV Continuous <Continuous>  dextrose 50% Injectable 12.5 Gram(s) IV Push once  dextrose 50% Injectable 25 Gram(s) IV Push once  dextrose 50% Injectable 25 Gram(s) IV Push once  insulin glargine Injectable (LANTUS) 8 Unit(s) SubCutaneous at bedtime  amiodarone    Tablet 200 milliGRAM(s) Oral daily  pantoprazole   Suspension 40 milliGRAM(s) Oral daily  aspirin  chewable 81 milliGRAM(s) Oral daily    MEDICATIONS  (PRN):  dextrose Gel 1 Dose(s) Oral once PRN Blood Glucose LESS THAN 70 milliGRAM(s)/deciliter  glucagon  Injectable 1 milliGRAM(s) IntraMuscular once PRN Glucose LESS THAN 70 milligrams/deciliter  acetaminophen    Suspension 650 milliGRAM(s) Oral every 6 hours PRN For Temp greater than 38 C (100.4 F)      Allergies    No Known Allergies    Intolerances        REVIEW OF SYSTEMS:  CONSTITUTIONAL: lethargic   poorly nourished  and poorly responsive   no obvoius nausea , vomiting, hemoptysis, hematemesis, hematochezia or hematuria   T(C): 37.3 (03 Aug 2017 17:05), Max: 37.3 (03 Aug 2017 17:05)  T(F): 99.2 (03 Aug 2017 17:05), Max: 99.2 (03 Aug 2017 17:05)  HR: 87 (03 Aug 2017 17:05) (62 - 94)  BP: 90/54 (03 Aug 2017 17:05) (90/54 - 118/70)  BP(mean): --  RR: 17 (03 Aug 2017 17:05) (17 - 18)  SpO2: 95% (03 Aug 2017 17:05) (95% - 100%)    PHYSICAL EXAM:  GENERAL: lethargic, poorly nourished   HEAD:  Atraumatic, Normocephalic    LABS:        CAPILLARY BLOOD GLUCOSE  163 (03 Aug 2017 16:00)  190 (03 Aug 2017 11:30)  201 (03 Aug 2017 08:00)  226 (02 Aug 2017 21:34)        Lipid panel:           Thyroid:  Diabetes Tests:  Parathyroid Panel:  Adrenals:  RADIOLOGY & ADDITIONAL TESTS:    Imaging Personally Reviewed:  [ ] YES  [ ] NO    Consultant(s) Notes Reviewed:  [ ] YES  [ ] NO    Care Discussed with Consultants/Other Providers [ ] YES  [ ] NO

## 2017-08-03 NOTE — PROGRESS NOTE ADULT - SUBJECTIVE AND OBJECTIVE BOX
Pt tolerated PEG placement well - started on tube feedings  finished antibiotics      MEDICATIONS  (STANDING):  tamsulosin 0.4 milliGRAM(s) Oral at bedtime  atorvastatin 40 milliGRAM(s) Oral at bedtime  pantoprazole    Tablet 40 milliGRAM(s) Oral before breakfast  memantine 10 milliGRAM(s) Oral two times a day  insulin lispro (HumaLOG) corrective regimen sliding scale   SubCutaneous three times a day before meals  dextrose 5%. 1000 milliLiter(s) (50 mL/Hr) IV Continuous <Continuous>  dextrose 50% Injectable 12.5 Gram(s) IV Push once  dextrose 50% Injectable 25 Gram(s) IV Push once  dextrose 50% Injectable 25 Gram(s) IV Push once  insulin glargine Injectable (LANTUS) 8 Unit(s) SubCutaneous at bedtime  insulin lispro Injectable (HumaLOG) 4 Unit(s) SubCutaneous before breakfast  insulin lispro Injectable (HumaLOG) 4 Unit(s) SubCutaneous before lunch  insulin lispro Injectable (HumaLOG) 4 Unit(s) SubCutaneous before dinner  aspirin enteric coated 81 milliGRAM(s) Oral daily  amiodarone    Tablet 200 milliGRAM(s) Oral daily    MEDICATIONS  (PRN):  dextrose Gel 1 Dose(s) Oral once PRN Blood Glucose LESS THAN 70 milliGRAM(s)/deciliter  glucagon  Injectable 1 milliGRAM(s) IntraMuscular once PRN Glucose LESS THAN 70 milligrams/deciliter  acetaminophen    Suspension 650 milliGRAM(s) Oral every 6 hours PRN For Temp greater than 38 C (100.4 F)      Allergies    No Known Allergies    Intolerances        Vital Signs Last 24 Hrs  T(C): 36.8 (03 Aug 2017 05:00), Max: 38 (02 Aug 2017 17:10)  T(F): 98.3 (03 Aug 2017 05:00), Max: 100.4 (02 Aug 2017 17:10)  HR: 83 (03 Aug 2017 05:00) (62 - 98)  BP: 112/71 (03 Aug 2017 05:00) (94/61 - 134/78)  BP(mean): --  RR: 18 (03 Aug 2017 05:00) (17 - 18)  SpO2: 100% (03 Aug 2017 05:00) (96% - 100%)    PHYSICAL EXAM:  General: NAD.  CVS: S1, S2  Chest: air entry bilaterally present  Abd: BS present, soft, non-tender, +peg      LABS:                        15.6   10.0  )-----------( 169      ( 03 Aug 2017 07:40 )             50.2     08-03    150<H>  |  111<H>  |  58<H>  ----------------------------<  214<H>  3.5   |  29  |  1.21    Ca    9.5      03 Aug 2017 07:40  Phos  3.2     08-02  Mg     2.7     08-02        tolerating tube feedings  advance as tolerated  stable from GI standpoint

## 2017-08-03 NOTE — PROGRESS NOTE ADULT - SUBJECTIVE AND OBJECTIVE BOX
Assessment:  New CVA  Chronic systolic CHF  Mild troponin leak in a setting of CVA, not likely AMI  Continue current CV management as tolerated.  Temp 98.5

## 2017-08-03 NOTE — PROGRESS NOTE ADULT - SUBJECTIVE AND OBJECTIVE BOX
INTERVAL HPI/OVERNIGHT EVENTS:  Pt on PEG.  Still non-responsive.    Pt remains mute; eyes closed shut, but exhibits purposeful left upper extremity movements.  Right dense plegia still present.  No new neuro change with examination.  Family visiting, spoke to them at length.  Patient just started on PEG nutrition and should be further observed for any neurologic improvement.  If his mental state remains as is, family was informed of possible DNR and DNI status.      family is aware of palliative care   prognosis poor   Vital Signs Last 24 Hrs  T(C): 37.3 (03 Aug 2017 17:05), Max: 37.3 (03 Aug 2017 17:05)  T(F): 99.2 (03 Aug 2017 17:05), Max: 99.2 (03 Aug 2017 17:05)  HR: 87 (03 Aug 2017 17:05) (62 - 94)  BP: 90/54 (03 Aug 2017 17:05) (90/54 - 118/70)  BP(mean): --  RR: 17 (03 Aug 2017 17:05) (17 - 18)  SpO2: 95% (03 Aug 2017 17:05) (95% - 100%)

## 2017-08-04 VITALS
RESPIRATION RATE: 17 BRPM | TEMPERATURE: 98 F | HEART RATE: 98 BPM | SYSTOLIC BLOOD PRESSURE: 112 MMHG | DIASTOLIC BLOOD PRESSURE: 71 MMHG | OXYGEN SATURATION: 99 %

## 2017-08-04 LAB
ALBUMIN SERPL ELPH-MCNC: 2.3 G/DL — LOW (ref 3.3–5)
ALP SERPL-CCNC: 198 U/L — HIGH (ref 40–120)
ALT FLD-CCNC: 52 U/L — SIGNIFICANT CHANGE UP (ref 12–78)
ANION GAP SERPL CALC-SCNC: 9 MMOL/L — SIGNIFICANT CHANGE UP (ref 5–17)
AST SERPL-CCNC: 74 U/L — HIGH (ref 15–37)
BILIRUB SERPL-MCNC: 0.6 MG/DL — SIGNIFICANT CHANGE UP (ref 0.2–1.2)
BUN SERPL-MCNC: 61 MG/DL — HIGH (ref 7–23)
CALCIUM SERPL-MCNC: 9.2 MG/DL — SIGNIFICANT CHANGE UP (ref 8.5–10.1)
CHLORIDE SERPL-SCNC: 113 MMOL/L — HIGH (ref 96–108)
CO2 SERPL-SCNC: 27 MMOL/L — SIGNIFICANT CHANGE UP (ref 22–31)
CREAT SERPL-MCNC: 1.17 MG/DL — SIGNIFICANT CHANGE UP (ref 0.5–1.3)
GLUCOSE SERPL-MCNC: 237 MG/DL — HIGH (ref 70–99)
POTASSIUM SERPL-MCNC: 4.2 MMOL/L — SIGNIFICANT CHANGE UP (ref 3.5–5.3)
POTASSIUM SERPL-SCNC: 4.2 MMOL/L — SIGNIFICANT CHANGE UP (ref 3.5–5.3)
PROT SERPL-MCNC: 7.2 GM/DL — SIGNIFICANT CHANGE UP (ref 6–8.3)
SODIUM SERPL-SCNC: 149 MMOL/L — HIGH (ref 135–145)

## 2017-08-04 RX ADMIN — Medication 4: at 12:46

## 2017-08-04 RX ADMIN — Medication 81 MILLIGRAM(S): at 12:47

## 2017-08-04 RX ADMIN — AMIODARONE HYDROCHLORIDE 200 MILLIGRAM(S): 400 TABLET ORAL at 06:18

## 2017-08-04 RX ADMIN — PANTOPRAZOLE SODIUM 40 MILLIGRAM(S): 20 TABLET, DELAYED RELEASE ORAL at 12:46

## 2017-08-04 RX ADMIN — Medication 4: at 07:32

## 2017-08-04 RX ADMIN — MEMANTINE HYDROCHLORIDE 10 MILLIGRAM(S): 10 TABLET ORAL at 06:17

## 2017-08-04 NOTE — PROGRESS NOTE ADULT - SUBJECTIVE AND OBJECTIVE BOX
INTERVAL HPI/OVERNIGHT EVENTS:  Subjective Complaints: Status unchanged neurologically.    RECENT RADIOLOGY & ADDITIONAL TESTS:    NEUROLOGICAL EXAM (Pertinent):  Vital Signs Last 24 Hrs  T(C): 36.9 (04 Aug 2017 11:11), Max: 38 (04 Aug 2017 05:05)  T(F): 98.4 (04 Aug 2017 11:11), Max: 100.4 (04 Aug 2017 05:05)  HR: 98 (04 Aug 2017 11:11) (76 - 98)  BP: 112/71 (04 Aug 2017 11:11) (90/54 - 112/71)  BP(mean): --  RR: 17 (04 Aug 2017 11:11) (17 - 18)  SpO2: 99% (04 Aug 2017 11:11) (95% - 99%)    MEDICATIONS  (STANDING):  tamsulosin 0.4 milliGRAM(s) Oral at bedtime  atorvastatin 40 milliGRAM(s) Oral at bedtime  memantine 10 milliGRAM(s) Oral two times a day  insulin lispro (HumaLOG) corrective regimen sliding scale   SubCutaneous three times a day before meals  dextrose 5%. 1000 milliLiter(s) (50 mL/Hr) IV Continuous <Continuous>  dextrose 50% Injectable 12.5 Gram(s) IV Push once  dextrose 50% Injectable 25 Gram(s) IV Push once  dextrose 50% Injectable 25 Gram(s) IV Push once  insulin glargine Injectable (LANTUS) 8 Unit(s) SubCutaneous at bedtime  amiodarone    Tablet 200 milliGRAM(s) Oral daily  pantoprazole   Suspension 40 milliGRAM(s) Oral daily  aspirin  chewable 81 milliGRAM(s) Oral daily    MEDICATIONS  (PRN):  dextrose Gel 1 Dose(s) Oral once PRN Blood Glucose LESS THAN 70 milliGRAM(s)/deciliter  glucagon  Injectable 1 milliGRAM(s) IntraMuscular once PRN Glucose LESS THAN 70 milligrams/deciliter  acetaminophen    Suspension 650 milliGRAM(s) Oral every 6 hours PRN For Temp greater than 38 C (100.4 F)    LABS:                        15.6   10.0  )-----------( 169      ( 03 Aug 2017 07:40 )             50.2     08-04    149<H>  |  113<H>  |  61<H>  ----------------------------<  237<H>  4.2   |  27  |  1.17    Ca    9.2      04 Aug 2017 08:36    TPro  7.2  /  Alb  2.3<L>  /  TBili  0.6  /  DBili  x   /  AST  74<H>  /  ALT  52  /  AlkPhos  198<H>  08-04      Assessment & Opinion:   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  MRI:  Acute left CHARLENE and MCA territory distribution infarcts as above. Mild hemorrhagic conversion left frontal lobe MCA territory infarct.  Stable chronic right PCA distribution infarct and moderate chronic microvascular change. Vascular Dementia.  Failure to Thrive.  Recommendations:   DVT prophylaxis as ordered.   Continue current medications. Placement. PEG nutrition

## 2017-08-04 NOTE — PROGRESS NOTE ADULT - SUBJECTIVE AND OBJECTIVE BOX
Patient is a 77y old  Male who presents with a chief complaint of Left facial drooping (28 Jul 2017 14:50)      Interval History: finger sticks are in mid to high 100's   on Lantus and Regular Insulin sliding scale coverage every 6 hours   tube feeds same   stable Endocrine wise , discharge planning is on     MEDICATIONS  (STANDING):  tamsulosin 0.4 milliGRAM(s) Oral at bedtime  atorvastatin 40 milliGRAM(s) Oral at bedtime  memantine 10 milliGRAM(s) Oral two times a day  insulin lispro (HumaLOG) corrective regimen sliding scale   SubCutaneous three times a day before meals  dextrose 5%. 1000 milliLiter(s) (50 mL/Hr) IV Continuous <Continuous>  dextrose 50% Injectable 12.5 Gram(s) IV Push once  dextrose 50% Injectable 25 Gram(s) IV Push once  dextrose 50% Injectable 25 Gram(s) IV Push once  insulin glargine Injectable (LANTUS) 8 Unit(s) SubCutaneous at bedtime  amiodarone    Tablet 200 milliGRAM(s) Oral daily  pantoprazole   Suspension 40 milliGRAM(s) Oral daily  aspirin  chewable 81 milliGRAM(s) Oral daily    MEDICATIONS  (PRN):  dextrose Gel 1 Dose(s) Oral once PRN Blood Glucose LESS THAN 70 milliGRAM(s)/deciliter  glucagon  Injectable 1 milliGRAM(s) IntraMuscular once PRN Glucose LESS THAN 70 milligrams/deciliter  acetaminophen    Suspension 650 milliGRAM(s) Oral every 6 hours PRN For Temp greater than 38 C (100.4 F)      Allergies    No Known Allergies    Intolerances        REVIEW OF SYSTEMS:  CONSTITUTIONAL: weak rundown    EYES: No eye pain, visual disturbances, or discharge  ENMT:  No difficulty hearing, tinnitus, vertigo; No sinus or throat pain  NECK: No pain or stiffness  RESPIRATORY: No cough, wheezing, chills or hemoptysis; No shortness of breath  CARDIOVASCULAR: No chest pain, palpitations, dizziness, or leg swelling  GASTROINTESTINAL: No abdominal or epigastric pain. No nausea, vomiting, or hematemesis; No diarrhea or constipation. No melena or hematochezia.  GENITOURINARY: No dysuria, frequency, hematuria, or incontinence  NEUROLOGICAL: No headaches, memory loss, loss of strength, numbness, or tremors  SKIN: No itching, burning, rashes, or lesions   LYMPH NODES: No enlarged glands  ENDOCRINE: No heat or cold intolerance; No hair loss  MUSCULOSKELETAL: No joint pain or swelling; No muscle, back, or extremity pain  PSYCHIATRIC: No depression, anxiety, mood swings, or difficulty sleeping  HEME/LYMPH: No easy bruising, or bleeding gums  ALLERY AND IMMUNOLOGIC: No hives or eczema    Vital Signs Last 24 Hrs  T(C): 36.9 (04 Aug 2017 11:11), Max: 38 (04 Aug 2017 05:05)  T(F): 98.4 (04 Aug 2017 11:11), Max: 100.4 (04 Aug 2017 05:05)  HR: 98 (04 Aug 2017 11:11) (76 - 98)  BP: 112/71 (04 Aug 2017 11:11) (101/66 - 112/71)  BP(mean): --  RR: 17 (04 Aug 2017 11:11) (17 - 18)  SpO2: 99% (04 Aug 2017 11:11) (96% - 99%)    PHYSICAL EXAM:  GENERAL: no changes   HEAD:  Atraumatic, Normocephalic  EYES: EOMI, PERRLA, conjunctiva and sclera clear  ENMT: No tonsillar erythema, exudates, or enlargement; Moist mucous membranes, Good dentition, No lesions  NECK: Supple, No JVD, Normal thyroid  NERVOUS SYSTEM:  Alert & Oriented X3, Good concentration; Motor Strength 5/5 B/L upper and lower extremities; DTRs 2+ intact and symmetric  CHEST/LUNG: Clear to percussion bilaterally; No rales, rhonchi, wheezing, or rubs  HEART: Regular rate and rhythm; No murmurs, rubs, or gallops  ABDOMEN: Soft, Nontender, Nondistended; Bowel sounds present  EXTREMITIES:  2+ Peripheral Pulses, No clubbing, cyanosis, or edema  SKIN: No rashes or lesions    LABS:        CAPILLARY BLOOD GLUCOSE  237 (04 Aug 2017 12:45)  221 (04 Aug 2017 07:31)  174 (03 Aug 2017 21:41)        Lipid panel:           Thyroid:  Diabetes Tests:  Parathyroid Panel:  Adrenals:  RADIOLOGY & ADDITIONAL TESTS:    Imaging Personally Reviewed:  [ ] YES  [ ] NO    Consultant(s) Notes Reviewed:  [ ] YES  [ ] NO    Care Discussed with Consultants/Other Providers [ ] YES  [ ] NO

## 2017-08-04 NOTE — PROGRESS NOTE ADULT - SUBJECTIVE AND OBJECTIVE BOX
Patient is a 77y old  Male who presents with a chief complaint of Left facial drooping (28 Jul 2017 14:50)    MEDICAL PROBLEMS:  CEREBRAL INFARCTION DUE TO THROMBOSIS OF LEFT MIDDLE CEREBRA  GENERAL WEAKNESS  Dementia  CAD (Coronary Artery Disease)  PUD (Peptic Ulcer Disease)  CVA (Cerebral Infarction)  DM (Diabetes Mellitus)  HTN - Hypertension  Cerebral infarction due to thrombosis of left middle cerebral artery  Abnormal thyroid blood test  Type 2 diabetes mellitus without complication, unspecified long term insulin use status  Essential hypertension  CAD (coronary artery disease)  Type 2 diabetes mellitus without complication, unspecified long term insulin use status      INTERVAL HPI/OVERNIGHT EVENTS: Awake, tolerated PEG feeding. Hypernatremia is improving    MEDICATIONS  (STANDING):  tamsulosin 0.4 milliGRAM(s) Oral at bedtime  atorvastatin 40 milliGRAM(s) Oral at bedtime  memantine 10 milliGRAM(s) Oral two times a day  insulin lispro (HumaLOG) corrective regimen sliding scale   SubCutaneous three times a day before meals  dextrose 5%. 1000 milliLiter(s) (50 mL/Hr) IV Continuous <Continuous>  dextrose 50% Injectable 12.5 Gram(s) IV Push once  dextrose 50% Injectable 25 Gram(s) IV Push once  dextrose 50% Injectable 25 Gram(s) IV Push once  insulin glargine Injectable (LANTUS) 8 Unit(s) SubCutaneous at bedtime  amiodarone    Tablet 200 milliGRAM(s) Oral daily  pantoprazole   Suspension 40 milliGRAM(s) Oral daily  aspirin  chewable 81 milliGRAM(s) Oral daily    MEDICATIONS  (PRN):  dextrose Gel 1 Dose(s) Oral once PRN Blood Glucose LESS THAN 70 milliGRAM(s)/deciliter  glucagon  Injectable 1 milliGRAM(s) IntraMuscular once PRN Glucose LESS THAN 70 milligrams/deciliter  acetaminophen    Suspension 650 milliGRAM(s) Oral every 6 hours PRN For Temp greater than 38 C (100.4 F)    Allergies    No Known Allergies    Intolerances      Vital Signs Last 24 Hrs  T(C): 38 (04 Aug 2017 05:05), Max: 38 (04 Aug 2017 05:05)  T(F): 100.4 (04 Aug 2017 05:05), Max: 100.4 (04 Aug 2017 05:05)  HR: 76 (04 Aug 2017 05:05) (76 - 94)  BP: 107/65 (04 Aug 2017 05:05) (90/54 - 107/66)  BP(mean): --  RR: 18 (04 Aug 2017 05:05) (17 - 18)  SpO2: 96% (04 Aug 2017 05:05) (95% - 97%)    PHYSICAL EXAM:  GENERAL: NAD, well-groomed, well-developed  HEAD:  Atraumatic, Normocephalic  EYES: EOMI, PERRLA, conjunctiva and sclera clear  ENMT: No tonsillar erythema, exudates, or enlargement; Moist mucous membranes, Good dentition, No lesions  NECK: Supple, No JVD, Normal thyroid  NERVOUS SYSTEM:  Alert & Oriented X3, Good concentration; Motor Strength 5/5 B/L upper and lower extremities; DTRs 2+ intact and symmetric  CHEST/LUNG: Clear to percussion bilaterally; No rales, rhonchi, wheezing, or rubs  HEART: Regular rate and rhythm; No murmurs, rubs, or gallops  ABDOMEN: Soft, Nontender, Nondistended; Bowel sounds present  EXTREMITIES:  2+ Peripheral Pulses, No clubbing, cyanosis, or edema  LYMPH: No lymphadenopathy noted  SKIN: No rashes or lesions    08-03 @ 07:01  -  08-04 @ 07:00  --------------------------------------------------------  IN: 1800 mL / OUT: 0 mL / NET: 1800 mL    08-04 @ 07:01 - 08-04 @ 12:09  --------------------------------------------------------  IN: 0 mL / OUT: 0 mL / NET: 0 mL        LABS:                        15.6   10.0  )-----------( 169      ( 03 Aug 2017 07:40 )             50.2     08-04    149<H>  |  113<H>  |  61<H>  ----------------------------<  237<H>  4.2   |  27  |  1.17    Ca    9.2      04 Aug 2017 08:36    TPro  7.2  /  Alb  2.3<L>  /  TBili  0.6  /  DBili  x   /  AST  74<H>  /  ALT  52  /  AlkPhos  198<H>  08-04        CAPILLARY BLOOD GLUCOSE  221 (04 Aug 2017 07:31)  174 (03 Aug 2017 21:41)  163 (03 Aug 2017 16:00)        Thyroid Stimulating Hormone, Serum: 0.555 uIU/mL (07-28 @ 06:17)  Troponin I, Serum: .153 ng/mL (07-27 @ 09:34)  CPK Mass Assay %: 3.7 % (07-27 @ 09:34)    Cultures:            RADIOLOGY & ADDITIONAL TESTS:    Imaging Personally Reviewed:  [X] YES  [ ] NO    Consultant(s) Notes Reviewed:  [X] YES  [ ] NO    Care Discussed with Consultants/Other Providers [X] YES  [ ] NO

## 2017-08-04 NOTE — PROGRESS NOTE ADULT - SUBJECTIVE AND OBJECTIVE BOX
Pt tolerating peg fededings      MEDICATIONS  (STANDING):  tamsulosin 0.4 milliGRAM(s) Oral at bedtime  atorvastatin 40 milliGRAM(s) Oral at bedtime  memantine 10 milliGRAM(s) Oral two times a day  insulin lispro (HumaLOG) corrective regimen sliding scale   SubCutaneous three times a day before meals  dextrose 5%. 1000 milliLiter(s) (50 mL/Hr) IV Continuous <Continuous>  dextrose 50% Injectable 12.5 Gram(s) IV Push once  dextrose 50% Injectable 25 Gram(s) IV Push once  dextrose 50% Injectable 25 Gram(s) IV Push once  insulin glargine Injectable (LANTUS) 8 Unit(s) SubCutaneous at bedtime  amiodarone    Tablet 200 milliGRAM(s) Oral daily  pantoprazole   Suspension 40 milliGRAM(s) Oral daily  aspirin  chewable 81 milliGRAM(s) Oral daily    MEDICATIONS  (PRN):  dextrose Gel 1 Dose(s) Oral once PRN Blood Glucose LESS THAN 70 milliGRAM(s)/deciliter  glucagon  Injectable 1 milliGRAM(s) IntraMuscular once PRN Glucose LESS THAN 70 milligrams/deciliter  acetaminophen    Suspension 650 milliGRAM(s) Oral every 6 hours PRN For Temp greater than 38 C (100.4 F)      Allergies    No Known Allergies    Intolerances        Vital Signs Last 24 Hrs  T(C): 38 (04 Aug 2017 05:05), Max: 38 (04 Aug 2017 05:05)  T(F): 100.4 (04 Aug 2017 05:05), Max: 100.4 (04 Aug 2017 05:05)  HR: 76 (04 Aug 2017 05:05) (76 - 94)  BP: 107/65 (04 Aug 2017 05:05) (90/54 - 118/70)  BP(mean): --  RR: 18 (04 Aug 2017 05:05) (17 - 18)  SpO2: 96% (04 Aug 2017 05:05) (95% - 100%)    PHYSICAL EXAM:  General: NAD.  CVS: S1, S2  Chest: air entry bilaterally present  Abd: BS present, soft, non-tender, +peg      LABS:                        15.6   10.0  )-----------( 169      ( 03 Aug 2017 07:40 )             50.2     08-04    149<H>  |  113<H>  |  61<H>  ----------------------------<  237<H>  4.2   |  27  |  1.17    Ca    9.2      04 Aug 2017 08:36    TPro  7.2  /  Alb  2.3<L>  /  TBili  0.6  /  DBili  x   /  AST  74<H>  /  ALT  52  /  AlkPhos  198<H>  08-04      Continue peg fweedings - stable  No acute GI problem - please re-consult GI if needed.

## 2017-08-04 NOTE — PROGRESS NOTE ADULT - PROVIDER SPECIALTY LIST ADULT
Cardiology
Endocrinology
Gastroenterology
Internal Medicine
Neurology
Palliative Care
Cardiology

## 2017-08-06 ENCOUNTER — INPATIENT (INPATIENT)
Facility: HOSPITAL | Age: 77
LOS: 0 days | End: 2017-08-06
Attending: INTERNAL MEDICINE | Admitting: INTERNAL MEDICINE
Payer: MEDICARE

## 2017-08-06 VITALS
RESPIRATION RATE: 24 BRPM | TEMPERATURE: 99 F | OXYGEN SATURATION: 90 % | HEIGHT: 66 IN | HEART RATE: 109 BPM | WEIGHT: 145.06 LBS

## 2017-08-06 VITALS — SYSTOLIC BLOOD PRESSURE: 123 MMHG | HEART RATE: 97 BPM | DIASTOLIC BLOOD PRESSURE: 45 MMHG

## 2017-08-06 DIAGNOSIS — A41.9 SEPSIS, UNSPECIFIED ORGANISM: ICD-10-CM

## 2017-08-06 LAB
ALBUMIN SERPL ELPH-MCNC: 1.8 G/DL — LOW (ref 3.3–5)
ALP SERPL-CCNC: 211 U/L — HIGH (ref 40–120)
ALT FLD-CCNC: 34 U/L — SIGNIFICANT CHANGE UP (ref 12–78)
ANION GAP SERPL CALC-SCNC: 12 MMOL/L — SIGNIFICANT CHANGE UP (ref 5–17)
APPEARANCE UR: CLEAR — SIGNIFICANT CHANGE UP
APTT BLD: 27.8 SEC — SIGNIFICANT CHANGE UP (ref 27.5–37.4)
AST SERPL-CCNC: 41 U/L — HIGH (ref 15–37)
BACTERIA # UR AUTO: ABNORMAL
BASE EXCESS BLDA CALC-SCNC: -2.2 MMOL/L — LOW (ref -2–2)
BASOPHILS # BLD AUTO: 0.1 K/UL — SIGNIFICANT CHANGE UP (ref 0–0.2)
BASOPHILS NFR BLD AUTO: 1 % — SIGNIFICANT CHANGE UP (ref 0–2)
BILIRUB SERPL-MCNC: 0.4 MG/DL — SIGNIFICANT CHANGE UP (ref 0.2–1.2)
BILIRUB UR-MCNC: ABNORMAL
BLD GP AB SCN SERPL QL: SIGNIFICANT CHANGE UP
BLOOD GAS COMMENTS: SIGNIFICANT CHANGE UP
BLOOD GAS SOURCE: SIGNIFICANT CHANGE UP
BUN SERPL-MCNC: 125 MG/DL — HIGH (ref 7–23)
CALCIUM SERPL-MCNC: 8.5 MG/DL — SIGNIFICANT CHANGE UP (ref 8.5–10.1)
CHLORIDE SERPL-SCNC: 110 MMOL/L — HIGH (ref 96–108)
CK MB CFR SERPL CALC: 1.3 NG/ML — SIGNIFICANT CHANGE UP (ref 0.5–3.6)
CO2 SERPL-SCNC: 26 MMOL/L — SIGNIFICANT CHANGE UP (ref 22–31)
COLOR SPEC: YELLOW — SIGNIFICANT CHANGE UP
COMMENT - URINE: SIGNIFICANT CHANGE UP
CREAT SERPL-MCNC: 2.85 MG/DL — HIGH (ref 0.5–1.3)
DIFF PNL FLD: ABNORMAL
EOSINOPHIL # BLD AUTO: 0.2 K/UL — SIGNIFICANT CHANGE UP (ref 0–0.5)
EOSINOPHIL NFR BLD AUTO: 1.9 % — SIGNIFICANT CHANGE UP (ref 0–6)
GLUCOSE SERPL-MCNC: 262 MG/DL — HIGH (ref 70–99)
GLUCOSE UR QL: NEGATIVE MG/DL — SIGNIFICANT CHANGE UP
HCO3 BLDA-SCNC: 25 MMOL/L — SIGNIFICANT CHANGE UP (ref 21–29)
HCT VFR BLD CALC: 45.9 % — SIGNIFICANT CHANGE UP (ref 39–50)
HGB BLD-MCNC: 14 G/DL — SIGNIFICANT CHANGE UP (ref 13–17)
HOROWITZ INDEX BLDA+IHG-RTO: 100 — SIGNIFICANT CHANGE UP
HYALINE CASTS # UR AUTO: ABNORMAL /LPF
INR BLD: 1.04 RATIO — SIGNIFICANT CHANGE UP (ref 0.88–1.16)
KETONES UR-MCNC: ABNORMAL
LACTATE SERPL-SCNC: 4.1 MMOL/L — CRITICAL HIGH (ref 0.7–2)
LEUKOCYTE ESTERASE UR-ACNC: ABNORMAL
LIDOCAIN IGE QN: 514 U/L — HIGH (ref 73–393)
LYMPHOCYTES # BLD AUTO: 2.1 K/UL — SIGNIFICANT CHANGE UP (ref 1–3.3)
LYMPHOCYTES # BLD AUTO: 22 % — SIGNIFICANT CHANGE UP (ref 13–44)
MAGNESIUM SERPL-MCNC: 3.4 MG/DL — HIGH (ref 1.6–2.6)
MCHC RBC-ENTMCNC: 26 PG — LOW (ref 27–34)
MCHC RBC-ENTMCNC: 30.5 GM/DL — LOW (ref 32–36)
MCV RBC AUTO: 85.2 FL — SIGNIFICANT CHANGE UP (ref 80–100)
MONOCYTES # BLD AUTO: 0.5 K/UL — SIGNIFICANT CHANGE UP (ref 0–0.9)
MONOCYTES NFR BLD AUTO: 5.3 % — SIGNIFICANT CHANGE UP (ref 2–14)
NEUTROPHILS # BLD AUTO: 6.6 K/UL — SIGNIFICANT CHANGE UP (ref 1.8–7.4)
NEUTROPHILS NFR BLD AUTO: 69.8 % — SIGNIFICANT CHANGE UP (ref 43–77)
NITRITE UR-MCNC: NEGATIVE — SIGNIFICANT CHANGE UP
NT-PROBNP SERPL-SCNC: 4685 PG/ML — HIGH (ref 0–450)
PCO2 BLDA: 58 MMHG — HIGH (ref 32–46)
PH BLD: 7.26 — LOW (ref 7.35–7.45)
PH UR: 5 — SIGNIFICANT CHANGE UP (ref 5–8)
PLATELET # BLD AUTO: 161 K/UL — SIGNIFICANT CHANGE UP (ref 150–400)
PO2 BLDA: 108 MMHG — SIGNIFICANT CHANGE UP (ref 74–108)
POTASSIUM SERPL-MCNC: 4.2 MMOL/L — SIGNIFICANT CHANGE UP (ref 3.5–5.3)
POTASSIUM SERPL-SCNC: 4.2 MMOL/L — SIGNIFICANT CHANGE UP (ref 3.5–5.3)
PROT SERPL-MCNC: 6 GM/DL — SIGNIFICANT CHANGE UP (ref 6–8.3)
PROT UR-MCNC: NEGATIVE MG/DL — SIGNIFICANT CHANGE UP
PROTHROM AB SERPL-ACNC: 11.3 SEC — SIGNIFICANT CHANGE UP (ref 9.8–12.7)
RBC # BLD: 5.39 M/UL — SIGNIFICANT CHANGE UP (ref 4.2–5.8)
RBC # FLD: 16 % — HIGH (ref 11–15)
RBC CASTS # UR COMP ASSIST: ABNORMAL /HPF (ref 0–4)
SAO2 % BLDA: 96 % — SIGNIFICANT CHANGE UP (ref 92–96)
SODIUM SERPL-SCNC: 148 MMOL/L — HIGH (ref 135–145)
SP GR SPEC: 1.01 — SIGNIFICANT CHANGE UP (ref 1.01–1.02)
TROPONIN I SERPL-MCNC: 0.28 NG/ML — HIGH (ref 0.01–0.04)
UROBILINOGEN FLD QL: 1 MG/DL
WBC # BLD: 9.4 K/UL — SIGNIFICANT CHANGE UP (ref 3.8–10.5)
WBC # FLD AUTO: 9.4 K/UL — SIGNIFICANT CHANGE UP (ref 3.8–10.5)
WBC UR QL: SIGNIFICANT CHANGE UP

## 2017-08-06 PROCEDURE — 71010: CPT | Mod: 26

## 2017-08-06 PROCEDURE — 31500 INSERT EMERGENCY AIRWAY: CPT

## 2017-08-06 PROCEDURE — 93010 ELECTROCARDIOGRAM REPORT: CPT

## 2017-08-06 PROCEDURE — 99291 CRITICAL CARE FIRST HOUR: CPT | Mod: 25

## 2017-08-06 RX ORDER — PIPERACILLIN AND TAZOBACTAM 4; .5 G/20ML; G/20ML
3.38 INJECTION, POWDER, LYOPHILIZED, FOR SOLUTION INTRAVENOUS ONCE
Qty: 0 | Refills: 0 | Status: COMPLETED | OUTPATIENT
Start: 2017-08-06 | End: 2017-08-06

## 2017-08-06 RX ORDER — ETOMIDATE 2 MG/ML
20 INJECTION INTRAVENOUS ONCE
Qty: 0 | Refills: 0 | Status: COMPLETED | OUTPATIENT
Start: 2017-08-06 | End: 2017-08-06

## 2017-08-06 RX ORDER — NOREPINEPHRINE BITARTRATE/D5W 8 MG/250ML
0.05 PLASTIC BAG, INJECTION (ML) INTRAVENOUS
Qty: 8 | Refills: 0 | Status: DISCONTINUED | OUTPATIENT
Start: 2017-08-06 | End: 2017-08-06

## 2017-08-06 RX ORDER — VANCOMYCIN HCL 1 G
1000 VIAL (EA) INTRAVENOUS ONCE
Qty: 0 | Refills: 0 | Status: COMPLETED | OUTPATIENT
Start: 2017-08-06 | End: 2017-08-06

## 2017-08-06 RX ORDER — ROCURONIUM BROMIDE 10 MG/ML
100 VIAL (ML) INTRAVENOUS ONCE
Qty: 0 | Refills: 0 | Status: COMPLETED | OUTPATIENT
Start: 2017-08-06 | End: 2017-08-06

## 2017-08-06 RX ORDER — SODIUM CHLORIDE 9 MG/ML
1000 INJECTION INTRAMUSCULAR; INTRAVENOUS; SUBCUTANEOUS ONCE
Qty: 0 | Refills: 0 | Status: COMPLETED | OUTPATIENT
Start: 2017-08-06 | End: 2017-08-06

## 2017-08-06 RX ADMIN — SODIUM CHLORIDE 1000 MILLILITER(S): 9 INJECTION INTRAMUSCULAR; INTRAVENOUS; SUBCUTANEOUS at 11:49

## 2017-08-06 RX ADMIN — Medication 100 MILLIGRAM(S): at 10:40

## 2017-08-06 RX ADMIN — Medication 6.17 MICROGRAM(S)/KG/MIN: at 12:26

## 2017-08-06 RX ADMIN — Medication 250 MILLIGRAM(S): at 13:45

## 2017-08-06 RX ADMIN — ETOMIDATE 20 MILLIGRAM(S): 2 INJECTION INTRAVENOUS at 10:39

## 2017-08-06 RX ADMIN — PIPERACILLIN AND TAZOBACTAM 200 GRAM(S): 4; .5 INJECTION, POWDER, LYOPHILIZED, FOR SOLUTION INTRAVENOUS at 13:08

## 2017-08-06 NOTE — ED ADULT NURSE NOTE - OBJECTIVE STATEMENT
Patient entered ER in respiratory distress and extremely lethargic. Patient received with GT in place. Patient entered ER in respiratory distress and extremely lethargic. Patient received with PEG in place.

## 2017-08-06 NOTE — ED PROVIDER NOTE - PHYSICAL EXAMINATION
Gen: Acute respiratory distress/ Unresponsive  HEENT: Atraumatica, normocephalic  Card: pulse weak, steady  Resp: Rales audible at bedside  GI: Nondistended  Ext: slight edema  Neuro: Unresponsive

## 2017-08-06 NOTE — ED PROVIDER NOTE - MEDICAL DECISION MAKING DETAILS
Ddx: Flash pulmonary edema/ CHF/ CVA/ sepsis  Plan: Intubation, labs, cxr, pt has variable bp, diurese if bp supportive, icu

## 2017-08-06 NOTE — ED ADULT NURSE REASSESSMENT NOTE - NS ED NURSE REASSESS COMMENT FT1
pt successfully intubated tube size 8.0  lip line 23, fio2 100 t/v500 Rr 19 peep 5.
Blood cultures collected but patient blood pressure low and circulation is poor. Unable to collect sufficient amount of blood for blood culture.
Family signed DNR around 16:00. Around 16:19 patient noted to be in V Tach. Patient became pulseless and was pronounced dead by DWAIN Mayorga and MD Weaver at 16:21. Body cleaned and wrapped up at this time. Family with body at this time.
[place] organ donor called at 17:02. Spoke to Barry Diaz Referral # [id number].
Received patient status post intubated. Patient remains extremely lethargic at this time. Fernandez 16 Fr placed in ER at 11:45am. Urine noted to be dark with an output of 200ml. Patient started on levophed as ordered. Vancomycin IVPB in progress at this time.
Returned from break around 15:20. Received patient status post code x 2.  Patient remains intubated at this time. Levophed and Neosynephrine IV in progress. Family at bedside at this time.
pt family arrived at this time, ICU attending is speaking to family for update.
~1430 pt condition worsened, 1434  pt became pulseless,  code blue called ACLS protocol initiated, pt regained pulse @1441.  ~1450 , pt became  pulseless again , code re-started with ACLS protocol in progress, ICU attending and NP Ann at bedside . Norepi, increase to 2mcg/kg.,  1500 +ROSC.

## 2017-08-06 NOTE — ED PROVIDER NOTE - OBJECTIVE STATEMENT
Pt is a 78 yo gentleman with a pmhx of DM, CVA, CHF, CAD who presents to the ED with respiratory distress. Per EMS, pt was in respiratory distress with AMS. Rales bilaterally, in acute distress with no mental status.

## 2017-08-07 LAB
CULTURE RESULTS: NO GROWTH — SIGNIFICANT CHANGE UP
SPECIMEN SOURCE: SIGNIFICANT CHANGE UP

## 2017-08-08 DIAGNOSIS — I69.391 DYSPHAGIA FOLLOWING CEREBRAL INFARCTION: ICD-10-CM

## 2017-08-08 DIAGNOSIS — I11.0 HYPERTENSIVE HEART DISEASE WITH HEART FAILURE: ICD-10-CM

## 2017-08-08 DIAGNOSIS — E87.0 HYPEROSMOLALITY AND HYPERNATREMIA: ICD-10-CM

## 2017-08-08 DIAGNOSIS — R74.8 ABNORMAL LEVELS OF OTHER SERUM ENZYMES: ICD-10-CM

## 2017-08-08 DIAGNOSIS — K27.9 PEPTIC ULCER, SITE UNSPECIFIED, UNSPECIFIED AS ACUTE OR CHRONIC, WITHOUT HEMORRHAGE OR PERFORATION: ICD-10-CM

## 2017-08-08 DIAGNOSIS — K31.7 POLYP OF STOMACH AND DUODENUM: ICD-10-CM

## 2017-08-08 DIAGNOSIS — R31.0 GROSS HEMATURIA: ICD-10-CM

## 2017-08-08 DIAGNOSIS — E86.0 DEHYDRATION: ICD-10-CM

## 2017-08-08 DIAGNOSIS — E78.5 HYPERLIPIDEMIA, UNSPECIFIED: ICD-10-CM

## 2017-08-08 DIAGNOSIS — R50.9 FEVER, UNSPECIFIED: ICD-10-CM

## 2017-08-08 DIAGNOSIS — R13.12 DYSPHAGIA, OROPHARYNGEAL PHASE: ICD-10-CM

## 2017-08-08 DIAGNOSIS — I69.392 FACIAL WEAKNESS FOLLOWING CEREBRAL INFARCTION: ICD-10-CM

## 2017-08-08 DIAGNOSIS — R94.6 ABNORMAL RESULTS OF THYROID FUNCTION STUDIES: ICD-10-CM

## 2017-08-08 DIAGNOSIS — R62.7 ADULT FAILURE TO THRIVE: ICD-10-CM

## 2017-08-08 DIAGNOSIS — E11.65 TYPE 2 DIABETES MELLITUS WITH HYPERGLYCEMIA: ICD-10-CM

## 2017-08-08 DIAGNOSIS — Z51.5 ENCOUNTER FOR PALLIATIVE CARE: ICD-10-CM

## 2017-08-08 DIAGNOSIS — I50.22 CHRONIC SYSTOLIC (CONGESTIVE) HEART FAILURE: ICD-10-CM

## 2017-08-08 DIAGNOSIS — I63.312 CEREBRAL INFARCTION DUE TO THROMBOSIS OF LEFT MIDDLE CEREBRAL ARTERY: ICD-10-CM

## 2017-08-08 DIAGNOSIS — E43 UNSPECIFIED SEVERE PROTEIN-CALORIE MALNUTRITION: ICD-10-CM

## 2017-08-08 DIAGNOSIS — F01.50 VASCULAR DEMENTIA WITHOUT BEHAVIORAL DISTURBANCE: ICD-10-CM

## 2017-08-08 DIAGNOSIS — I25.10 ATHEROSCLEROTIC HEART DISEASE OF NATIVE CORONARY ARTERY WITHOUT ANGINA PECTORIS: ICD-10-CM

## 2017-08-08 DIAGNOSIS — I69.351 HEMIPLEGIA AND HEMIPARESIS FOLLOWING CEREBRAL INFARCTION AFFECTING RIGHT DOMINANT SIDE: ICD-10-CM

## 2017-08-08 DIAGNOSIS — I69.320 APHASIA FOLLOWING CEREBRAL INFARCTION: ICD-10-CM

## 2017-08-11 LAB
CULTURE RESULTS: SIGNIFICANT CHANGE UP
CULTURE RESULTS: SIGNIFICANT CHANGE UP
SPECIMEN SOURCE: SIGNIFICANT CHANGE UP
SPECIMEN SOURCE: SIGNIFICANT CHANGE UP

## 2017-08-21 DIAGNOSIS — I50.9 HEART FAILURE, UNSPECIFIED: ICD-10-CM

## 2017-08-21 DIAGNOSIS — J96.20 ACUTE AND CHRONIC RESPIRATORY FAILURE, UNSPECIFIED WHETHER WITH HYPOXIA OR HYPERCAPNIA: ICD-10-CM

## 2017-08-21 DIAGNOSIS — I25.10 ATHEROSCLEROTIC HEART DISEASE OF NATIVE CORONARY ARTERY WITHOUT ANGINA PECTORIS: ICD-10-CM

## 2017-08-21 DIAGNOSIS — R06.00 DYSPNEA, UNSPECIFIED: ICD-10-CM

## 2017-08-21 DIAGNOSIS — E11.9 TYPE 2 DIABETES MELLITUS WITHOUT COMPLICATIONS: ICD-10-CM

## 2017-08-21 DIAGNOSIS — Z86.73 PERSONAL HISTORY OF TRANSIENT ISCHEMIC ATTACK (TIA), AND CEREBRAL INFARCTION WITHOUT RESIDUAL DEFICITS: ICD-10-CM

## 2017-11-02 NOTE — ED ADULT NURSE NOTE - CADM POA CENTRAL LINE
While in clinic today patient was scheduled for an EGD and COLONOSCOPY, under MAC Sedation, with DR GUALLPA on 01/08/2018 for Anemia, Rectal Bleeding, History of Colitis, and GERD. Patient advised to contact insurance company to verify coverage as this is a Diagnostic procedure due to the diagnoses listed. Patient also advise to see PCP within 30 days prior to procedure for clearance for Anesthesia. Patient verbalized understanding.Please send Service to Family Practice for H&P.   No

## 2018-01-01 NOTE — ED ADULT NURSE NOTE - OBJECTIVE STATEMENT
[facility]   Intensive Care Unit Daily Note    Name: Gila Calabrese (was Vijaya Krishnamurthy) (Baby1 Gianna Krishnamurthy)  Parents: Gianna Krishnamurthy and Preston Calabrese  YOB: 2016    History of Present Illness    1 lb 12.6 oz (810 g), 24w4d large for gestational age, female infant born by  due to maternal chorioamnionitis and PPROM. The infant was admitted directly to the NICU for further evaluation, monitoring and treatment of prematurity, RDS and possible sepsis.    Patient Active Problem List   Diagnosis     RDS (respiratory distress syndrome in the )     Prematurity, 24 weeks gestation     Malnutrition (H)     Need for observation and evaluation of  for sepsis      Interval History   No new issues    Assessment & Plan   Overall Status:  42 day old ELBW borderline LGA female infant who is now 30w4d PMA with respiratory failure due to RDS. She remains at risk for morbidities associated with prematurity. This patient is critically ill with respiratory failure requiring CPAP support and CR monitoring, due to prematurity.     Access: None  UAC-removed , UVC-removed .  PICC - (removed due to clot)    FEN:    Vitals:    18 0200 18 0200 18 0200   Weight: 1.46 kg (3 lb 3.5 oz) 1.47 kg (3 lb 3.9 oz) 1.48 kg (3 lb 4.2 oz)     Weight change: 0.01 kg (0.4 oz)   83% change from BW    Malnutrition.    Enrolled in Enhanced Nutrition Study     Appropriate I/O, ~ at fluid goal with adequate UO.     Continue:  - Total fluids 150 mL/kg/day   - Full enteral feeds (-) MBM 28kcal/oz with sHMF and Neosure +LP infusing over 105 min (difficulty with consolidation due to hypoglycemia). Will wean to 90 minutes in AM so that preprandial can be obtained with other labs  - History of water loss stool, resolved   - Vit D 800 (level 17, f/u level on )  - Start NaCl 2  - Review with dietician and lactation  specialists - see separate notes.   - Monitor I/O, weights, growth    History of intermittent hypoglycemia - glu 42 on  and critical labs sent, insulin 2.0, cortisol 12.6, ketones 0.2. Endo without further recommendations at this time. Will reevaluate as she tolerates consolidation of her feedings. Will consider diazoxide if unable to consolidate feeds further as she gets closer to starting oral feeding.  - continuing to monitor preprandial glu daily and prn  - goal glu > 60    - , previously 919, f/u per protocol until <400 (peak 1674 )    Renal: insuffiency likely related to medications/volume depletion-downtrending. We are following I/O, UOP, creatinine.    Creatinine   Date Value Ref Range Status   2018 0.15 - 0.53 mg/dL Final     Respiratory:  Ongoing failure due to RDS. CPAP from delivery until . Intubated  due to apnea/worsening O2 needs. Extubated on  to LINDSAY CPAP. Has received surfactant x 3    Currently on LINDSAY 0.8 Popeye-CPAP 9, FiO2 20s%. PEEP to 8 now.   - CBGs 2-3X per week  - Intermittent lasix (last , trial of 3 days of lasix -)  - S/p Trial of lasix daily x3 day through  - seemed to respond, started diuril , increase to 40,   - Skin breakdown of nasal bridge from CPAP - wound nurse consult, mepilex    Apnea of Prematurity:  Few ABDs  - Continue caffeine until ~33-34 weeks PMA.       Cardiovascular:   Good BP and perfusion. Intermittent murmur  ECHO 5/3 with PPS, PFO, no PDA, good function  - Continue routine CR monitoring  - Monitor perfusion/BP  - Repeat echo 6/3 (BPD follow up as well as for evaluation before possible initiation of diazoxide)    ID: No current concern for infection.  - Continue to monitor.     Hx:  Received empiric antibiotic therapy for possible sepsis due to  delivery, maternal +GBS and RDS.   Cx NTD and low CRP x3, but elevated WBC - now continuing to decrease. CSF studies do not suggest meningitis.  Repeat bld cx   given bloody stool NGTD.  Elevated gent level  was erroneous, follow-up level normal and consistent with pharmacokinetics.  Completed ampicillin and gentamicin 2018 after 7d for culture negative sepsis.  New sepsis eval on  +CONS in trach aspirate, + BCx Staph Epi from day 3 of incubation, repeat BCx negative from  and + from  (+[facility]). Repeat BCx 5/10, ,  NGTD. LP with negative gram stain, 5 WBC, Glu 38. Length of therapy pending results of repeat cultures. CRP <2.9 x 3. S/p Vanco x14 days (through )  Ureaplasma positive s/p Azithromycin x 10d      Hematology: At risk for anemia of Prematurity/ Phlebotomy. Last transfusion   - Assess need for iron supplementation at 2 weeks of age, with full feeds, per dietician's recs.  - Monitor serial hemoglobin levels twice weekly  - Ferritin most recently  - increased Fe supplement to 8.5 on   - Continue supplemental Fe (8.5), increased on . Recheck ferritin/hgb 1 week.   - Transfuse as needed w goal Hgb >12. Last pRBC .   - Continue Epo (started )- d/c EPO for ferritin <30 on .      Recent Labs  Lab 18  0418   HGB 13.3     Hyperbilirubinemia: At risk for physiologic hyperbilirubinemia related to prematurity. A+/A+. Phototherapy restarted on -    Recent Labs   Lab Test  18   0544  05/10/18   0601  18   0548  18   0545  18   0400   BILITOTAL  0.6  2.0  3.4  5.2  5.2   DBIL  0.3*  0.5*  0.5*  0.4  0.4      CNS: At risk for IVH/PVL. Completed prophylactic indocin.  - Screening head ultrasound  was normal, will repeat if any clinical instability and at ~36 wks GA (eval for PVL).    Toxicology: Testing indicated due to unexplained  delivery. Urine tox screen neg. Mec tox +THC.  - Review with SW     ROP:  At risk due to prematurity. Plan for ROP exam with Peds Ophthalmology per protocol.First exam ~.    Thermoregulation: Stable with current support.   - Continue to monitor  temperature and provide thermal support as indicated.    HCM:   - [place] positive for CAH (negative on second screen)  - Repeat NMS at 14 days-borderline AA, A>F, will repeat at 30 days old (pending).  - Obtain hearing/CCHD screens PTD.  - Obtain carseat trial PTD.  - Continue standard NICU cares and family education plan.    Immunizations   Uptodate.  Immunization History   Administered Date(s) Administered     Hep B, Peds or Adolescent 2018        Medications   Current Facility-Administered Medications   Medication     breast milk for bar code scanning verification 1 Bottle     caffeine citrate (CAFCIT) solution 14 mg     chlorothiazide (DIURIL) suspension 15 mg     cholecalciferol (vitamin D/D-VI-SOL) liquid 400 Units     ferrous sulfate (DANA-IN-SOL) oral drops 6 mg     glycerin (PEDI-LAX) Suppository 0.25 suppository     sodium chloride (PF) 0.9% PF flush 1 mL     sodium chloride ORAL solution 1.5 mEq     sucrose (SWEET-EASE) solution 0.2-2 mL      Physical Exam - Attending Physician   HEENT:  AFOSF  CV:  Heart regular in rate and rhythm, no murmur heard. Cap refill 2 sec.  Chest:  Good aeration bilaterally.  Abd:  Rounded and soft  Skin:  Well perfused, pink. Neuro:  Tone appropriate for age.         Communications   Parents:  Updated daily by the team.    PCPs:   Infant PCP: Physician No Ref-Primary  Maternal OB PCP:   Information for the patient's mother:  Gianna Ford [[id number]]   Mralene Espana  MFM: Dr. Doyle  Delivering OB: Thomas  Admission note routed to all.  Updated in [facility] on 18.     Health Care Team:  Patient discussed with the care team.    A/P, imaging studies, laboratory data, medications and family situation reviewed.  Diann Bradley MD    Attending Neonatologist:  This patient has been seen and evaluated by me, Dasha Johnson MD on 2018.  I agree with the assessment and plan, as outlined in  the fellow's note, which includes my edits.    Expectation for hospitalization for 2 or more midnights for the following reasons: evaluation and treatment of prematurity, respiratory failure.    This patient is critically ill with respiratory failure requiring nCPAP support.          Presented to the er d/o of difficulty breathing at night and when laying down. Denies any chest pain

## 2018-07-12 NOTE — DIETITIAN INITIAL EVALUATION ADULT. - TIME FRAME
Anesthesia Evaluation     NPO Solid Status: > 8 hours  NPO Liquid Status: > 6 hours           Airway   Mallampati: II  TM distance: >3 FB  Neck ROM: full  No difficulty expected  Dental - normal exam     Pulmonary - normal exam   (+) a smoker Former,   Cardiovascular - normal exam    PT is on anticoagulation therapy  Patient on routine beta blocker    (+) past MI  >12 months, cardiac stents more than 12 months ago       Neuro/Psych  GI/Hepatic/Renal/Endo      Musculoskeletal     Abdominal   (+) obese,    Substance History      OB/GYN          Other                        Anesthesia Plan    ASA 3     MAC     intravenous induction   Anesthetic plan and risks discussed with patient.       1 month

## 2019-04-13 NOTE — PHYSICAL THERAPY INITIAL EVALUATION ADULT - REFERRING PHYSICIAN, REHAB EVAL
CHIEF COMPLAINT    Chief Complaint   Patient presents with   • Cough   • Shortness of Breath       HPI    86 yo female presents with family for increased shortness of breath since Sunday. Pt states it started with sinus congestion and nasal pressure and had a productive cough with yellow mucus. Patient went and saw her primary care doctor Monday and was diagnosed with viral infection and given a Medrol Dosepak and told to take Mucinex and Claritin. chest x-ray was clear.    Since Monday patient has had increased weakness and increased dyspnea with exertion. Positive orthopnea that's chronic. Patient states increased swelling to her lower legs but is compliant with her Lasix. No relief with at home medications. Patient states has lost weight secondary to not eating or drinking. Decreased urinary output. No signs or symptoms of urinary tract infection. Increased weakness and fatigue. Pt states unable to cook for herself.     No chest pain. No nausea, vomiting, diarrhea. No fevers or chills. No flu exposure.    Pt states she does wear 2L of oxygen at home only at night.   Allergies    ALLERGIES:   Allergen Reactions   • Fish SWELLING   • Seafood   (Food) SWELLING   • Cardizem    • Coenzyme    • Crestor [Rosuvastatin Calcium]    • Dyazide [Hydrochlorothiazide W/Triamterene]    • Fluoxetine    • Lescol    • Lipitor [Atorvastatin Calcium]    • Niaspan [Niacin (Antihyperlipidemic)]    • Zestril [Lisinopril]    • Zetia [Ezetimibe]    • Zocor [Simvastatin]        Current Medications   Current Facility-Administered Medications   Medication Dose Route Frequency Provider Last Rate Last Dose   • furosemide (LASIX) injection 40 mg  40 mg Intravenous Once Destiney Melgar PA-C       • cefTRIAXone (ROCEPHIN) 2,000 mg in sodium chloride 0.9 % 100 mL IVPB  2,000 mg Intravenous Once Destiney Melgar PA-C 200 mL/hr at 04/13/19 1949 2,000 mg at 04/13/19 1949    And   • azithromycin (ZITHROMAX) 500 mg in sodium chloride 0.9 % 250 mL IVPB   500 mg Intravenous Once Destiney Melgar PA-C         Current Outpatient Medications   Medication Sig Dispense Refill   • methylPREDNISolone (MEDROL DOSEPAK) 4 MG tablet follow package directions 21 tablet 0   • metoPROLOL succinate (TOPROL-XL) 50 MG 24 hr tablet TAKE ONE TABLET BY MOUTH TWICE DAILY 180 tablet 1   • furosemide (LASIX) 40 MG tablet Take 40 mg in the am and 20 in the evening. 135 tablet 3   • PROAIR  (90 Base) MCG/ACT inhaler INHALE 2 PUFFS INTO THE LUNGS EVERY 4 HOURS AS NEEDED FOR SHORTNESS OF BREATH. 9 g 2   • fluticasone (FLONASE) 50 MCG/ACT nasal spray SPRAY 2 SPRAYS IN EACH NOSTRIL DAILY. 16 g 10   • losartan (COZAAR) 25 MG tablet Take 0.5 tablets by mouth daily. 45 tablet 3   • pantoprazole (PROTONIX) 40 MG tablet Take 1 tablet by mouth daily. 30 tablet 11   • fluticasone-salmeterol (ADVAIR DISKUS) 250-50 MCG/DOSE inhaler Inhale 1 puff into the lungs 2 times daily. 60 each 10   • allopurinol (ZYLOPRIM) 100 MG tablet TAKE TWO TABLETS BY MOUTH DAILY 180 tablet 4   • levothyroxine (SYNTHROID, LEVOTHROID) 25 MCG tablet Take 1 tablet by mouth daily. 90 tablet 3   • iron polysaccharide complex (NIFEREX-150) 150 MG capsule Take 1 capsule by mouth 2 times daily. 180 capsule 3   • tiotropium (SPIRIVA HANDIHALER) 18 MCG capsule for inhaler INHALE CONTENTS OF 1 CAPSULE ONCE DAILY. 30 capsule 11   • Hypromellose (ARTIFICIAL TEARS OP) Apply 1 drop to eye as needed.     • hydrALAZINE (APRESOLINE) 25 MG tablet Take 1 tablet by mouth every 8 hours. 90 tablet 11   • Liniments (BLUE-EMU SUPER STRENGTH EX)      • blood glucose test strip Test blood sugar one time daily as directed. Diagnosis: E11.9. 100 strip 3   • Ascorbic Acid (VITAMIN C) 500 MG tablet Take 500 mg by mouth 2 times daily.     • Glucosamine HCl (GLUCOSAMINE PO) Take 1,000 mg by mouth 2 times daily.      • albuterol (VENTOLIN) (2.5 MG/3ML) 0.083% nebulizer solution Take 3 mLs by nebulization every 4 hours as needed for Wheezing. Dx: J44.9 375  mL 0   • Lactase (LACTAID FAST ACT PO)      • nitroGLYcerin (NITROSTAT) 0.4 MG SL tablet Place 1 tablet under the tongue every 5 minutes as needed for Chest pain. 90 tablet 1   • blood glucose meter Test blood sugar one time daily as directed. Diagnosis: E11.9. 1 kit 0   • blood glucose lancets Test blood sugar one time daily as directed. Diagnosis: E11.9. 100 each 3   • loratadine (CLARITIN) 10 MG tablet Take 10 mg by mouth daily as needed for Allergies.     • GuaiFENesin (MUCINEX PO) Take 1-2 times per day as needed     • Polyethylene Glycol 3350 (MIRALAX PO) Take 17 g by mouth daily as needed (constipation).      • acetaminophen (TYLENOL) 500 MG tablet Take 500 mg by mouth every 6 hours as needed.     • Oxygen 20-80 % KIT Inhale 2 L/min into the lungs.         Past Medical History    Past Medical History:   Diagnosis Date   • Allergy    • Arthritis    • Atrial fibrillation (CMS/[facility]) 9/10/2014   • Bronchitis    • CAD (coronary artery disease)    • Chronic pain    • CKD (chronic kidney disease) stage 3, GFR 30-59 ml/min (CMS/[facility])    • Congestive cardiac failure (CMS/[facility])    • COPD (chronic obstructive pulmonary disease) (CMS/[facility])    • Diabetes type 2, controlled (CMS/[facility])    • Disorder of bone and cartilage, unspecified 11/21/2011   • Esophageal reflux    • GERD (gastroesophageal reflux disease)    • Gout    • HLD (hyperlipidemia)    • HTN (hypertension)    • Hypothyroidism    • Hypoxemic respiratory failure, chronic (CMS/[facility]) 9/8/2014   • Low back pain    • GERDA (obstructive sleep apnea)    • Pericardial effusion 9/8/2014   • Pneumonia    • RAD (reactive airway disease)        Surgical History    Past Surgical History:   Procedure Laterality Date   • Cataract extraction w/  intraocular lens implant  06/06/2006    x2   • Cholecystectomy  11/27/2007   • Colonoscopy  5/2008    [facility]   • Colonoscopy  10/31/2014    no further routine needed   • Colonoscopy diagnostic  8/2013    due for repeat colonoscopy  2016   • Colonoscopy remove lesion by snare  2010   • Echocardiogram     • Esophagogastroduodenoscopy  2017   • Esophagogastroduodenoscopy  2017    Dr. Smith   • Esophagogastroduodenoscopy transoral flex w/bx single or mult  2008    [facility]   • Place cath in rt hrt,main pulm art      2014   • Removal gallbladder     • Upper gastrointestinal endoscopy     • Upper gastrointestinal endoscopy  10/31/14   • Xray mammogram screening bilat  2011       Social History    Social History     Tobacco Use   • Smoking status: Former Smoker     Years: 60.00     Types: Cigarettes     Last attempt to quit: 2009     Years since quittin.9   • Smokeless tobacco: Never Used   Substance Use Topics   • Alcohol use: No     Comment: NONE    • Drug use: No     Comment: NONE        Family History    Family History   Problem Relation Age of Onset   • Diabetes Daughter    • Diabetes Son    • Arthritis Son    • Liver Disease Son    • Diabetes Maternal Aunt    • Diabetes Paternal Grandmother    • Cancer Mother         multiple myeloma   • Cataracts Mother    • Cancer, Lung Father    • Cancer Sister    • Arthritis Sister    • NEGATIVE FAMILY HX OF Other         Colon Cancer   • Patient is unaware of any medical problems Daughter    • Patient is unaware of any medical problems Daughter    • Patient is unaware of any medical problems Son        REVIEW OF SYSTEMS    ALL 13 SYSTEMS REVIEWED AND NEGATIVE OR NONCONTRIBUTORY UNLESS OTHERWISE NOTED IN HPI      PHYSICAL EXAM     ED Triage Vitals [19 1833]   ED Triage Vitals Group      Temp 98.5 °F (36.9 °C)      Pulse 82      Resp 30      /66      SpO2 97 %      EtCO2 mmHg       Height       Weight       Weight Scale Used        Gen:   AAOx3 in mild respiratory distress. Audible wheezing and rales. Pt tachypnic with O2 sats at 84% on room air.   Head:  Normocephalic, without abnormal findings  HEENT:   PERRL, EOMI without Nystagmus.  Sclera anicteric   Nose:  Clear without blood or purulent mucous   Mouth: Patent without swelling.  Moist well perfused mucous membranes. No post nasal drainage.    Ear: TM's clear bilaterally. No mastoid tenderness. No pain upon palpation. Mild fluid behind the left ear.   Neck: No masses, thyromegaly, posterior tenderness or meningeal signs. No JVD  Resp: + accessory muscle use and difficulty speaking in full sentences. Decreased breath sounds bilaterally. Moderate wheezing noted throughout and upper lobes anterior and posterior. Rales noted to lower-mid lungs.   CVS: Regular rate and rhythm without significant murmur, rub or gallop  ABD: Normoactive BS, Nontender, No masses or organomegaly  Back: No CVA tenderness, deformities, swelling or ecchymosis  Ext:  Pt has old bruising noted to left shoulder anterior and posterior that is secondary to fall few weeks. Pt pitting edema 2-3+ that extends to just blow the knees. Normal ROM of all extremities.   Skin:  Well perfused, no significant rashes. No jaundice. Poor skin turgor.   Neuro: No focal Neuro deficits with equal UE/LE strength and sensation.  Lymph:No significant Lymphadenopathy  Psych:Alert and interactive with normal affect and interaction. Pt able to give clear and lucid history.      Procedures      MDM    Pt here for worsening shortness of breath since Monday. Pt states worsening cough and increased swelling of legs. No weight gain but states has also not been eating. Differentials: CHF exacerbation, bronchitis, pneumonia, atypical ACS, viral process, URI.     Initial sepsis scan was negative.     Based off of CXR pt has dual pneumonia (RLL)  and CHF exacerbation. Pt will stay on O2, lasix and nitro given, along with abx, rocephin and azithromycin. Pt has not been on recent abx.     New leukocytosis found. Pt has chronic stable anemia. Chronic renal insufficiency. Mild elevated troponinc but hx of baseline elevations and could also be secondary to  demand.     Pt and family informed of CXR and will admit pt       EKG per ED Physician interpretation  Rate: 71  Rhythm: normal sinus rhythm   Abnormality: yes, LAD, RBBB, change from prior EKG    Labs  Results for orders placed or performed during the hospital encounter of 04/13/19   CBC & Auto Differential   Result Value    WBC 13.4 (H)    RBC 3.03 (L)    HGB 9.8 (L)    HCT 31.2 (L)    .0 (H)    MCH 32.3    MCHC 31.4 (L)    RDW-CV 16.0 (H)        NRBC 0    DIFF TYPE AUTOMATED DIFFERENTIAL    Neutrophil 90    LYMPH 2    MONO 7    EOSIN 0    BASO 0    Percent Immature Granuloctyes 1    Absolute Neutrophil 12.1 (H)    Absolute Lymph 0.3 (L)    Absolute Mono 0.9    Absolute Eos 0.0 (L)    Absolute Baso 0.0    Absolute Immature Granulocytes 0.1   Basic Metabolic Panel   Result Value    Sodium 141    Potassium 4.4    Chloride 107    Carbon Dioxide 21    Anion Gap 17    Glucose 137 (H)    BUN 89 (H)    Creatinine 1.87 (H)    GFR Estimate,  28     Comment: eGFR 15 - 29 mL/min/1.73m2 = Severe decrease in kidney function. Stage 4 CKD (chronic kidney disease), or severe kidney disease.    GFR Estimate, Non  24     Comment: eGFR 15 - 29 mL/min/1.73m2 = Severe decrease in kidney function. Stage 4 CKD (chronic kidney disease), or severe kidney disease.    BUN/Creatinine Ratio 48 (H)    CALCIUM 9.1   Troponin I Ultra Sensitive   Result Value    TROPONIN I 0.19 (HH)     Comment: Consistent with myocardial injury.  Mild elevations of  troponin may indicate noninfarction cardiac injury or early myocardial infarction.  Serial studies may be helpful.  CALLED TO, READ BACK AND CONFIRMED  VICTORIANO AT 2017 ON 04.13.19 BY [id number]     Arterial Blood Gas   Result Value    pH Arterial 7.28 (L)    PCO2 Arterial 40    PO2 181 (H)    HCO3 Arterial 18 (L)    Base Deficit Arterial 8 (H)    sO2 Arterial 99    fO2HB Arterial 96    O2 FLOW(LPM) 4   Lactic Acid Venous   Result Value    Lactic Acid  Venous 1.2   Prothrombin Time   Result Value    PROTIME 11.3    INR 1.1     Comment: INR Therapeutic Range: 2.0 to 3.0 (2.5 to 3.5 recommended for recurrent thrombotic episodes and mechanical prosthetic heart valves.)          Radiology  XR Chest AP or PA   Final Result   IMPRESSION:   1. Cardiomegaly with findings suggestive of vascular congestion/fluid   overload or an interstitial infiltrate. Correlate clinically.   2. There is patchy and somewhat focal airspace disease in the right lower   lobe with bilateral pleural effusions. This could represent compressive   atelectasis, but clinical differentiation of a developing infiltrate is   necessary.               Medications   furosemide (LASIX) injection 40 mg (has no administration in time range)   cefTRIAXone (ROCEPHIN) 2,000 mg in sodium chloride 0.9 % 100 mL IVPB (2,000 mg Intravenous New Bag 4/13/19 1949)     And   azithromycin (ZITHROMAX) 500 mg in sodium chloride 0.9 % 250 mL IVPB (has no administration in time range)   albuterol-ipratropium 2.5 mg/0.5 mg (DUONEB) nebulizer solution 3 mL (3 mLs Nebulization Given 4/13/19 1857)   nitroGLYcerin topical (NITRO-BID) 2 % ointment 1 inch (1 inch Topical Given 4/13/19 1949)       Rechecks   7:27 PM pt after breathing tx has opened up but moderate persistent rales and wheezing and now rhonchi heard as well. Will try pt on bipap as pt states she is tired and still using accessory muscle use.     7:39 PM pt on bipap and is tolerating well. Pt sleeping with normal respirations. No longer using accessory muscle use. daughter states she looks more comfortable and breathing is less labored.       Consults  8:28 PM spoke with Dr. Mel Valle who will admit pt.     Diagnosis:  ED Diagnoses        Final diagnoses    Acute congestive heart failure, unspecified heart failure type (CMS/HCC)          Pneumonia of right lower lobe due to infectious organism (CMS/HCC)          Hypoxia          SOB (shortness of breath)           Leukocytosis, unspecified type          Chronic anemia                     Destiney Melgar PA-C  04/13/19 2029     Elsi Avilez

## 2020-03-23 NOTE — PROGRESS NOTE ADULT - I WAS PHYSICALLY PRESENT FOR THE KEY PORTIONS OF THE EVALUATION AND MANAGEMENT (E/M) SERVICE PROVIDED.  I AGREE WITH THE ABOVE HISTORY, PHYSICAL, AND PLAN WHICH I HAVE REVIEWED AND EDITED WHERE APPROPRIATE
Pt complaining of left upper dental pain and ear pain x 1 week
Statement Selected

## 2020-06-02 NOTE — PATIENT PROFILE ADULT. - CAREGIVER PHONE NUMBER
Called patient, no answer.  Patient to contact Orthopedic department for a sooner appointment for knee pain.  Patient has an appointment scheduled for PM&R and placed on the wait list.      ----- Message from Elena Cole sent at 6/1/2020  6:43 PM CDT -----  Contact: Patient Portal   Patient Message:  Requesting an appointment about knee pain     Appointment date requested if any:  6/8/20 ( Monday afternoon preferably)    Additional information:  No appointment available as requested       
[phone]

## 2020-06-29 NOTE — CHART NOTE - NSCHARTNOTEFT_GEN_A_CORE
Chronic obstructive pulmonary disease    DM2 (diabetes mellitus, type 2)    Hepatitis    HLD (hyperlipidemia)    [facility] NP:    Called by Tomeka WELLS for adjustment of NGT placed last night. Patient is a 77y old  Male who presents with a chief complaint of Left facial drooping (28 Jul 2017 14:50).     T(C): 37 (07-30-17 @ 10:59), Max: 37.7 (07-29-17 @ 17:28)  T(F): 98.6 (07-30-17 @ 10:59), Max: 99.8 (07-29-17 @ 17:28)  HR: 85 (07-30-17 @ 10:59) (67 - 85)  BP: 151/85 (07-30-17 @ 10:59) (114/65 - 151/85)  RR: 18 (07-30-17 @ 10:59) (18 - 18)  SpO2: 93% (07-30-17 @ 10:59) (93% - 100%)    KUB: < from: Xray Kidney Ureter Bladder (07.29.17 @ 17:00) >    IMPRESSION:     A portion of the feeding tube is coiled in the oral cavity, with tip is   in the gastric fundus. Adjustment and repositioning is recommended.    Dr. Umana discussed the above findings with PRISCILLA Eckert at 1124a on   7/30/17 with read back.    < end of copied text >        NGT pulled back and reinserted. Pt tolerated procedure well. Placement verified via auscultation. KUB ordered to confirm placement.

## 2020-11-19 NOTE — OCCUPATIONAL THERAPY INITIAL EVALUATION ADULT - ASSISTIVE DEVICE:SUPINE/SIT, REHAB EVAL
Last office visit:  10/20/2020     Next office visit:  12/15/2020     Surgery:  DOS:  7/16/2020 OPEN REDUCTION ODONTOID FRACTURE, OPEN REDUCTION CERVICAL 1 BURST FRACTURE, OCCIPUT - CERVICAL 3 POSTERIOR INSTRUMENTATION & FUSION    Date last filled:  Oxycodone 11/11/2020    Is the patient due for refill of this medication(s):  Yes    PDMP review:  Criteria met. Forwarded to Physician/LEIGH ANN for signature. Left message for Silke Luxs that refill request will be sent to Dr. Des Bella to sign medication order either today or tomorrow and script will be sent to [facility]. I may be reached at [facility] until 4:00 today, extension [phone]. bed rails

## 2021-08-02 NOTE — GOALS OF CARE CONVERSATION - PERSONAL ADVANCE DIRECTIVE - MOLST COMPLETED
Post-op OV : After Small Bowel Enteroscopy -> ;   Post-op OV : Patient reports of current symptoms -> no symptoms;   Post-op OV : Patient denies -> rectal bleeding, fever, nausea & vomiting since the procedure date;   Post-op OV : Patient -> denies any new changes in his/her health status since last OV.  Patient was advised to continue taking Pantoprazole from PCP;   Post-op OV : After EGD on -> 07/12/2021 to reassess GERD sx and hx of atrophic gastritis, GE junction was irregular with esophageal bxx showing no significant histopathology. Mild gastritis in the gastric body and antrum were noted. Gastric bxx showed mild chronic gastritis with positive intestinal metaplasia, but negative for H. pylori. Normal duodenum;   Post-op OV : After ERCP on -> ;   Post-op OV : Flexible sigmoidoscopy on -> ;   
02-Aug-2017

## 2021-09-18 NOTE — ED ADULT NURSE NOTE - CAS EDN DISCHARGE INTERVENTIONS
For information on Fall & injury Prevention, visit [email or web address]
IV intact

## 2022-10-06 NOTE — OCCUPATIONAL THERAPY INITIAL EVALUATION ADULT - LIGHT TOUCH SENSATION, RUE, REHAB EVAL
-- DO NOT REPLY / DO NOT REPLY ALL --  -- Message is from [facility] [employer] ([facility]) --    General Patient Message [facility] followingup  on a request for DME supplies, [employer] supplies. Request faxed on 10.2.2022     Caller Information       Type Contact Phone/Fax    10/06/2022 12:55 PM CDT Phone (Incoming) Kerry (Provider) [phone]     [place]         Alternative phone number: none    Can a detailed message be left? Yes    Message Turnaround:     Is it Working Hours? Yes - Working Hours     [place]:    Please give this turnaround time to the caller:   \"This message will be sent to [state Provider's name]. The clinical team will fulfill your request as soon as they review your message.\"                 severe impairment

## 2023-01-31 NOTE — PHYSICAL THERAPY INITIAL EVALUATION ADULT - NS ASR WT BEARING DETAIL RUE
none
nonweight-bearing/secondary to fall 3/20/17 "There is anterior dislocation of the right shoulder with a small comminuted Hill-Sachs fracture of the humeral head"

## 2023-10-23 NOTE — ED ADULT TRIAGE NOTE - NS ED TRIAGE AVPU SCALE
Encounter Date: 10/23/2023    ED Physician Progress Notes        Signout Note  I received signout from the previous providers.     Chief complaint:  Hyperkalemia (Hypotension, sent from clinic)      Per sign out and chart review: Jaime Lucia is a 63 y.o. male presented with ***    During ED stay patient ***.    Pt signed out to me pending: ***    Update/ Disposition: ***    Patient, caregiver and/or family understands the plan and verbalized agreement. All questions answered.     Diagnostic Impression:    1. Hypotension, unspecified hypotension type    2. Hyperkalemia    3. Chest pain         ED Disposition Condition    Observation Stable             Unresponsive - The patient is nonverbal and does not respond even when a painful stimulus is applied.

## 2024-03-04 NOTE — ED ADULT TRIAGE NOTE - RESPIRATORY RATE (BREATHS/MIN)
balance training/bed mobility training/gait training/neuromuscular re-education/strengthening/transfer training 20

## 2024-04-27 NOTE — DISCHARGE NOTE ADULT - SIGNS AND SYMPTOMS OF STROKE:
Please make sure to drink plenty of fluids.  Take the prescribed antibiotic.  Follow-up with your primary care doctor.  Return to the emergency department for any new or worsening symptoms.              DISCHARGE SUMMARY from Nurse    PATIENT INSTRUCTIONS:    After general anesthesia or intravenous sedation, for 24 hours or while taking prescription Narcotics:  Limit your activities  Do not drive and operate hazardous machinery  Do not make important personal or business decisions  Do  not drink alcoholic beverages  If you have not urinated within 8 hours after discharge, please contact your surgeon on call.    Report the following to your surgeon:  Excessive pain, swelling, redness or odor of or around the surgical area  Temperature over 100.5  Nausea and vomiting lasting longer than 4 hours or if unable to take medications  Any signs of decreased circulation or nerve impairment to extremity: change in color, persistent  numbness, tingling, coldness or increase pain  Any questions    What to do at Home:  Recommended activity: activity as tolerated,     If you experience any of the following symptoms nausea, fever, vomiting, pain unrelieved by medication, please follow up with your Primary Care Provider or return to the emergency room.    *  Please give a list of your current medications to your Primary Care Provider.    *  Please update this list whenever your medications are discontinued, doses are      changed, or new medications (including over-the-counter products) are added.    *  Please carry medication information at all times in case of emergency situations.    These are general instructions for a healthy lifestyle:    No smoking/ No tobacco products/ Avoid exposure to second hand smoke  Surgeon General's Warning:  Quitting smoking now greatly reduces serious risk to your health.    Obesity, smoking, and sedentary lifestyle greatly increases your risk for illness    A healthy diet, regular physical exercise  Statement Selected

## 2025-03-31 NOTE — PROGRESS NOTE ADULT - SUBJECTIVE AND OBJECTIVE BOX
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 Awake, unable to participate PT    MEDICATIONS  (STANDING):  tamsulosin 0.4 milliGRAM(s) Oral at bedtime  atorvastatin 40 milliGRAM(s) Oral at bedtime  pantoprazole    Tablet 40 milliGRAM(s) Oral before breakfast  memantine 10 milliGRAM(s) Oral two times a day  insulin lispro (HumaLOG) corrective regimen sliding scale   SubCutaneous three times a day before meals  dextrose 5%. 1000 milliLiter(s) (50 mL/Hr) IV Continuous <Continuous>  dextrose 50% Injectable 12.5 Gram(s) IV Push once  dextrose 50% Injectable 25 Gram(s) IV Push once  dextrose 50% Injectable 25 Gram(s) IV Push once  insulin glargine Injectable (LANTUS) 8 Unit(s) SubCutaneous at bedtime  insulin lispro Injectable (HumaLOG) 4 Unit(s) SubCutaneous before breakfast  insulin lispro Injectable (HumaLOG) 4 Unit(s) SubCutaneous before lunch  insulin lispro Injectable (HumaLOG) 4 Unit(s) SubCutaneous before dinner  aspirin enteric coated 81 milliGRAM(s) Oral daily  amiodarone    Tablet 200 milliGRAM(s) Oral daily    MEDICATIONS  (PRN):  dextrose Gel 1 Dose(s) Oral once PRN Blood Glucose LESS THAN 70 milliGRAM(s)/deciliter  glucagon  Injectable 1 milliGRAM(s) IntraMuscular once PRN Glucose LESS THAN 70 milligrams/deciliter  acetaminophen    Suspension 650 milliGRAM(s) Oral every 6 hours PRN For Temp greater than 38 C (100.4 F)    Allergies    No Known Allergies    Intolerances      Vital Signs Last 24 Hrs  T(C): 36.8 (03 Aug 2017 05:00), Max: 38 (02 Aug 2017 17:10)  T(F): 98.3 (03 Aug 2017 05:00), Max: 100.4 (02 Aug 2017 17:10)  HR: 83 (03 Aug 2017 05:00) (62 - 98)  BP: 112/71 (03 Aug 2017 05:00) (94/61 - 134/78)  BP(mean): --  RR: 18 (03 Aug 2017 05:00) (17 - 18)  SpO2: 100% (03 Aug 2017 05:00) (96% - 100%)    PHYSICAL EXAM:  GENERAL: NAD, well-groomed, well-developed  HEAD:  Atraumatic, Normocephalic  EYES: EOMI, PERRLA, conjunctiva and sclera clear  ENMT: No tonsillar erythema, exudates, or enlargement; Moist mucous membranes, Good dentition, No lesions  NECK: Supple, No JVD, Normal thyroid  NERVOUS SYSTEM:  Alert & Oriented X3, Good concentration; Motor Strength 5/5 B/L upper and lower extremities; DTRs 2+ intact and symmetric  CHEST/LUNG: Clear to percussion bilaterally; No rales, rhonchi, wheezing, or rubs  HEART: Regular rate and rhythm; No murmurs, rubs, or gallops  ABDOMEN: Soft, Nontender, Nondistended; Bowel sounds present  EXTREMITIES:  2+ Peripheral Pulses, No clubbing, cyanosis, or edema  LYMPH: No lymphadenopathy noted  SKIN: No rashes or lesions    08-02 @ 07:01  -  08-03 @ 07:00  --------------------------------------------------------  IN: 870 mL / OUT: 0 mL / NET: 870 mL        LABS:                        15.6   10.0  )-----------( 169      ( 03 Aug 2017 07:40 )             50.2     08-03    150<H>  |  111<H>  |  58<H>  ----------------------------<  214<H>  3.5   |  29  |  1.21    Ca    9.5      03 Aug 2017 07:40  Phos  3.2     08-02  Mg     2.7     08-02          CAPILLARY BLOOD GLUCOSE  201 (03 Aug 2017 08:00)  226 (02 Aug 2017 21:34)  176 (02 Aug 2017 16:57)  233 (02 Aug 2017 12:10)        Thyroid Stimulating Hormone, Serum: 0.555 uIU/mL (07-28 @ 06:17)  Troponin I, Serum: .153 ng/mL (07-27 @ 09:34)  CPK Mass Assay %: 3.7 % (07-27 @ 09:34)    Cultures:            RADIOLOGY & ADDITIONAL TESTS:    Imaging Personally Reviewed:  [X] YES  [ ] NO    Consultant(s) Notes Reviewed:  [X] YES  [ ] NO    Care Discussed with Consultants/Other Providers [X] YES  [ ] NO 45 y/o male with pmhx MR, autism, bipolar disorder, and seizure disorder presents to the ED for laceration to his lip s/p fall at group home. Recent admission for hypoxic failure with DC March 11th, 2025. Aide at bedside- Aide is not sure exactly of what happened because it occurred overnight. He may have fallen- they aren't sure. Aide states he is acting his usual behavior. No vomiting, no coughing, no SOB/CP. As per chart > tetanus shot in 2022 in ED. 43 y/o male with pmhx MR, autism, bipolar disorder, and seizure disorder presents to the ED for laceration to his lip s/p fall at group home. Recent admission for hypoxic failure with DC March 11th, 2025. Aide at bedside- Aide is not sure exactly of what happened because it occurred overnight. He may have fallen- they aren't sure. Aide states he is acting his usual behavior. No vomiting, no coughing, no SOB/CP. As per chart > tetanus shot in 2022 in ED  -Patient medicated to assist in suture attempt, on Pulse Ox   -lac cleaned and clinical decision to not suture due to no clean edges to bring together   Abx sent. Patient acting baseline upon discharge. Aide understands all precautions and dispo directions.   Return precautions given. Stable for dc.